# Patient Record
Sex: FEMALE | Race: WHITE | NOT HISPANIC OR LATINO | Employment: OTHER | URBAN - METROPOLITAN AREA
[De-identification: names, ages, dates, MRNs, and addresses within clinical notes are randomized per-mention and may not be internally consistent; named-entity substitution may affect disease eponyms.]

---

## 2017-02-02 ENCOUNTER — OFFICE VISIT (OUTPATIENT)
Dept: FAMILY MEDICINE | Facility: CLINIC | Age: 82
End: 2017-02-02
Payer: MEDICARE

## 2017-02-02 VITALS
DIASTOLIC BLOOD PRESSURE: 78 MMHG | OXYGEN SATURATION: 97 % | BODY MASS INDEX: 21.84 KG/M2 | RESPIRATION RATE: 19 BRPM | SYSTOLIC BLOOD PRESSURE: 138 MMHG | HEART RATE: 61 BPM | HEIGHT: 63 IN | WEIGHT: 123.25 LBS

## 2017-02-02 DIAGNOSIS — E55.9 VITAMIN D DEFICIENCY: ICD-10-CM

## 2017-02-02 DIAGNOSIS — Z78.9 ALCOHOL USE: Chronic | ICD-10-CM

## 2017-02-02 DIAGNOSIS — Z86.73 H/O: STROKE: ICD-10-CM

## 2017-02-02 DIAGNOSIS — G31.84 MILD COGNITIVE IMPAIRMENT: ICD-10-CM

## 2017-02-02 DIAGNOSIS — E78.5 HYPERLIPIDEMIA, UNSPECIFIED HYPERLIPIDEMIA TYPE: ICD-10-CM

## 2017-02-02 DIAGNOSIS — Z87.81 HISTORY OF VERTEBRAL FRACTURE: ICD-10-CM

## 2017-02-02 DIAGNOSIS — I70.0 ATHEROSCLEROSIS OF AORTA: ICD-10-CM

## 2017-02-02 DIAGNOSIS — E03.9 ACQUIRED HYPOTHYROIDISM: ICD-10-CM

## 2017-02-02 DIAGNOSIS — I10 HYPERTENSION, BENIGN: ICD-10-CM

## 2017-02-02 DIAGNOSIS — Z00.00 ENCOUNTER FOR PREVENTIVE HEALTH EXAMINATION: Primary | ICD-10-CM

## 2017-02-02 PROCEDURE — 99999 PR PBB SHADOW E&M-EST. PATIENT-LVL IV: CPT | Mod: PBBFAC,,, | Performed by: NURSE PRACTITIONER

## 2017-02-02 PROCEDURE — 3078F DIAST BP <80 MM HG: CPT | Mod: S$GLB,,, | Performed by: NURSE PRACTITIONER

## 2017-02-02 PROCEDURE — G0439 PPPS, SUBSEQ VISIT: HCPCS | Mod: S$GLB,,, | Performed by: NURSE PRACTITIONER

## 2017-02-02 PROCEDURE — 99499 UNLISTED E&M SERVICE: CPT | Mod: S$GLB,,, | Performed by: NURSE PRACTITIONER

## 2017-02-02 PROCEDURE — 3075F SYST BP GE 130 - 139MM HG: CPT | Mod: S$GLB,,, | Performed by: NURSE PRACTITIONER

## 2017-02-02 NOTE — MR AVS SNAPSHOT
[facility]  [address]  [place] [address]  Phone: [phone]  Fax: [phone]                  Sharona Acosta   2017 10:00 AM   Office Visit    Description:  Female : 1935   Provider:  HRA, LAPALCO 3   Department:  [facility]           Reason for Visit     Health Risk Assessment                To Do List           Goals (5 Years of Data)     None      [facility] On Call     [facility] On Call Nurse Care Line -  Assistance  Registered nurses in the [facility] On Call Center provide clinical advisement, health education, appointment booking, and other advisory services.  Call for this free service at [phone].             Medications           Message regarding Medications     Verify the changes and/or additions to your medication regime listed below are the same as discussed with your clinician today.  If any of these changes or additions are incorrect, please notify your healthcare provider.             Verify that the below list of medications is an accurate representation of the medications you are currently taking.  If none reported, the list may be blank. If incorrect, please contact your healthcare provider. Carry this list with you in case of emergency.           Current Medications     acetaminophen (TYLENOL) 325 MG tablet Take 2 tablets (650 mg total) by mouth every 8 (eight) hours as needed (Mild pain or T>100.4).    alendronate (FOSAMAX) 70 MG tablet TAKE 1 TABLET BY MOUTH EVERY 7 DAYS    aspirin 81 MG Chew Take 81 mg by mouth once daily.      levothyroxine (SYNTHROID) 75 MCG tablet TAKE 1 TABLET BY MOUTH EVERY DAY    simvastatin (ZOCOR) 40 MG tablet TAKE ONE TABLET BY MOUTH EVERY EVENING    verapamil (VERELAN) 240 MG C24P TAKE 1 CAPSULE BY MOUTH EVERY DAY    diazepam (VALIUM) 2 MG tablet Take 1 tab bid for 2 days, then 1 tab daily for 2 days then stop.    hydrocodone-acetaminophen 5-325mg (NORCO) 5-325 mg per tablet Take 1 tablet by mouth  "every 8 (eight) hours as needed (Severe pain).    polyethylene glycol (GLYCOLAX) 17 gram PwPk Take 17 g by mouth once daily.           Clinical Reference Information           Your Vitals Were     BP Pulse Resp Height Weight SpO2    138/78 (BP Location: Left arm, Patient Position: Sitting, BP Method: Manual) 61 19 5' 3" (1.6 m) 55.9 kg (123 lb 3.8 oz) 97%    BMI                21.83 kg/m2          Blood Pressure          Most Recent Value    BP  138/78      Allergies as of 2/2/2017     Lipitor [Atorvastatin]      Immunizations Administered on Date of Encounter - 2/2/2017     None      Language Assistance Services     ATTENTION: Language assistance services are available, free of charge. Please call [phone].      ATENCIÓN: Si louiela kelly, tiene a gregg disposición servicios gratuitos de asistencia lingüística. Llame al [phone].     BG Ý: N?u b?n nói Ti?ng Vi?t, có các d?ch v? h? tr? ngôn ng? mi?n phí dành cho b?n. G?i s? [phone].         [facility] complies with applicable Federal civil rights laws and does not discriminate on the basis of race, color, national origin, age, disability, or sex.        "

## 2017-02-05 DIAGNOSIS — E78.5 HYPERLIPIDEMIA: ICD-10-CM

## 2017-02-05 DIAGNOSIS — I10 HYPERTENSION, BENIGN: ICD-10-CM

## 2017-02-06 RX ORDER — LEVOTHYROXINE SODIUM 75 UG/1
TABLET ORAL
Qty: 90 TABLET | Refills: 0 | Status: SHIPPED | OUTPATIENT
Start: 2017-02-06 | End: 2017-07-18 | Stop reason: SDUPTHER

## 2017-02-06 RX ORDER — VERAPAMIL HYDROCHLORIDE 240 MG/1
CAPSULE, EXTENDED RELEASE ORAL
Qty: 90 CAPSULE | Refills: 0 | Status: SHIPPED | OUTPATIENT
Start: 2017-02-06 | End: 2017-07-18 | Stop reason: SDUPTHER

## 2017-02-06 RX ORDER — SIMVASTATIN 40 MG/1
TABLET, FILM COATED ORAL
Qty: 90 TABLET | Refills: 0 | Status: SHIPPED | OUTPATIENT
Start: 2017-02-06 | End: 2017-07-18 | Stop reason: SDUPTHER

## 2017-02-06 NOTE — PROGRESS NOTES
"Sharona Acosta presented for a  Medicare AWV and comprehensive Health Risk Assessment today. The following components were reviewed and updated:    · Medical history  · Family History  · Social history  · Allergies and Current Medications  · Health Risk Assessment  · Health Maintenance  · Care Team     ** See Completed Assessments for Annual Wellness Visit within the encounter summary.**       The following assessments were completed:  · Living Situation  · CAGE  · Depression Screening  · Timed Get Up and Go  · Whisper Test  · Cognitive Function Screening  · Nutrition Screening  · ADL Screening  · PAQ Screening    Vitals:    02/02/17 1121   BP: 138/78   BP Location: Left arm   Patient Position: Sitting   BP Method: Manual   Pulse: 61   Resp: 19   SpO2: 97%   Weight: 55.9 kg (123 lb 3.8 oz)   Height: 5' 3" (1.6 m)     Body mass index is 21.83 kg/(m^2).  Physical Exam   Cardiovascular: Normal rate, regular rhythm and normal heart sounds.    Pulmonary/Chest: Effort normal and breath sounds normal.   Neurological: She is alert. Abnormal gait: using 4-prong walker for gait assistance    Skin: Skin is warm.   Psychiatric: She has a normal mood and affect. Her behavior is normal. Thought content normal.   Vitals reviewed.        Diagnoses and health risks identified today and associated recommendations/orders:    1. Encounter for preventive health examination  Education provided about prevent health examinations and procedures; discussed patient's health concerns, holistically addressed patient's health plan.   Patient presented with spouse.     2. Atherosclerosis of aorta  Stable, asymptomatic on ASA, STATIN, and antihypertensives with good control; monitor    3. Hypertension, benign  The current medical regimen is effective;  continue present plan and medications.    4. Hyperlipidemia, unspecified hyperlipidemia type  Lipid panel (Sept 2015) reflects with acceptable range. The current medical regimen is effective;  continue " present plan and medications.    5. Urinary complication  Patient using adult brief for sanitary daytime and nocturia purposes.    6. History of vertebral fracture  Stable, with history of falls and fractures, she uses 4 prong walker for gait assistance; provided patient with fall risk band.     7. Alcohol use  Discussed the health risks associated with alcohol usage.     8. Acquired hypothyroidism  TSH 2.334 (Sept 2015).   The current medical regimen is effective;  continue present plan and medications.    9. H/O: stroke  Stable, monitor.     10. Vitamin D deficiency  Stable, monitor.     11. Mild cognitive impairment  Stable, patient recently moved into an assisted living facility ([facility]); she expresses being receptive of the new living arrangements. She lives with her , participates in fitness activities at the facility.     Return in about 4 weeks (around 3/2/2017).    [facility] Jr, NP

## 2017-02-06 NOTE — PATIENT INSTRUCTIONS
Counseling and Referral of Other Preventative  (Italic type indicates deductible and co-insurance are waived)    Patient Name: Sharona Acosta  Today's Date: 2/6/2017      SERVICE LIMITATIONS RECOMMENDATION    Vaccines    · Pneumococcal (once after 65)    · Influenza (annually)    · Hepatitis B (if medium/high risk)    · Prevnar 13      Hepatitis B medium/high risk factors:       - End-stage renal disease       - Hemophiliacs who received Factor VII or         IX concentrates       - Clients of institutions for the mentally             retarded       - Persons who live in the same house as          a HepB carrier       - Homosexual men       - Illicit injectable drug abusers     Pneumococcal: Done, no repeat necessary     Influenza: Done, repeat in one year     Hepatitis B: N/A no clinical risk factors      Prevnar 13: Done, repeat at next scheduled date    Mammogram (biennial age 50-74)  Annually (age 40 or over)  Last done 3/2014, recommend to repeat every 1  years    Pap (up to age 70 and after 70 if unknown history or abnormal study last 10 years)    N/A age: 79, no clinical risk factors      age: 81, no clinical risk factors     Colorectal cancer screening (to age 75)    · Fecal occult blood test (annual)  · Flexible sigmoidoscopy (5y)  · Screening colonoscopy (10y)  · Barium enema   N/A age: 81, no clinical risk factors     Diabetes self-management training (no USPSTF recommendations)  Requires referral by treating physician for patient with diabetes or renal disease. 10 hours of initial DSMT sessions of no less than 30 minutes each in a continuous 12-month period. 2 hours of follow-up DSMT in subsequent years.  N/A non-diabetic, no clinical risk factors     Bone mass measurements (age 65 & older, biennial)  Requires diagnosis related to osteoporosis or estrogen deficiency. Biennial benefit unless patient has history of long-term glucocorticoid  Last done 9/2016, recommend to repeat every 2  years    Glaucoma  screening (no USPSTF recommendation)  Diabetes mellitus, family history   , age 50 or over    American, age 65 or over  N/A patient has external eye care provider     Medical nutrition therapy for diabetes or renal disease (no recommended schedule)  Requires referral by treating physician for patient with diabetes or renal disease or kidney transplant within the past 3 years.  Can be provided in same year as diabetes self-management training (DSMT), and CMS recommends medical nutrition therapy take place after DSMT. Up to 3 hours for initial year and 2 hours in subsequent years.  N/A no clinical risk factors     Cardiovascular screening blood tests (every 5 years)  · Fasting lipid panel  Order as a panel if possible  Last done 9/2015, recommend to repeat every 5  years    Diabetes screening tests (at least every 3 years, Medicare covers annually or at 6-month intervals for prediabetic patients)  · Fasting blood sugar (FBS) or glucose tolerance test (GTT)  Patient must be diagnosed with one of the following:       - Hypertension       - Dyslipidemia       - Obesity (BMI 30kg/m2)       - Previous elevated impaired FBS or GTT       ... or any two of the following:       - Overweight (BMI 25 but <30)       - Family history of diabetes       - Age 65 or older       - History of gestational diabetes or birth of baby weighing more than 9 pounds  CMP 9/2015 Fasting glucose within normal range      Abdominal aortic aneurysm screening (once)  · Sonogram   Limited to patients who meet one of the following criteria:       - Men who are 65-75 years old and have smoked more than 100 cigarette in their lifetime       - Anyone with a family history of abdominal aortic aneurysm       - Anyone recommended for screening by the USPSTF  N/A no clinical risk factors     HIV screening (annually for increased risk patients)  · HIV-1 and HIV-2 by EIA, or RILEY, rapid antibody test or oral mucosa transudate  Patients  must be at increased risk for HIV infection per USPSTF guidelines or pregnant. Tests covered annually for patient at increased risk or as requested by the patient. Pregnant patients may receive up to 3 tests during pregnancy.  Risks discussed, screening is not recommended    Smoking cessation counseling (up to 8 sessions per year)  Patients must be asymptomatic of tobacco-related conditions to receive as a preventative service.  does not use tobacco     Subsequent annual wellness visit  At least 12 months since last AWV  Return in one year     The following information is provided to all patients.  This information is to help you find resources for any of the problems found today that may be affecting your health:                Living healthy guide: www.[facility].[place].[facility]      Understanding Diabetes: www.diabetes.org      Eating healthy: www.cdc.gov/healthyweight      CDC home safety checklist: www.cdc.gov/steadi/patient.html      Agency on Aging: www.goea.[place].[facility]      Alcoholics anonymous (AA): www.aa.org      Physical Activity: www.maria fernanda.nih.gov/ip9rkii      Tobacco use: www.quitwithusla.org

## 2017-03-26 DIAGNOSIS — M85.80 OSTEOPENIA: ICD-10-CM

## 2017-03-27 RX ORDER — ALENDRONATE SODIUM 70 MG/1
TABLET ORAL
Qty: 12 TABLET | Refills: 0 | Status: SHIPPED | OUTPATIENT
Start: 2017-03-27 | End: 2017-06-09

## 2017-04-27 ENCOUNTER — TELEPHONE (OUTPATIENT)
Dept: FAMILY MEDICINE | Facility: CLINIC | Age: 82
End: 2017-04-27

## 2017-04-27 NOTE — TELEPHONE ENCOUNTER
Was she started on any pain medication?  This BP is ok, but could start going up if now off BP medication. Would continue to monitor daily and call us back with results beginning of next week.

## 2017-04-27 NOTE — TELEPHONE ENCOUNTER
Spoke w/patient's daughter states Adali patient was hospitalized at [place] for a fall she had on Sunday. [facility] patient's b/p was elevated andl they did not know what b/p medications patient was on, therefore patient was prescribed Lisinopril 5mg daily and instructed to drink plenty of water with it. Want to know if patient should continue taking Verapamil 240mg or begin taking the Lisinopril. Please advise, [facility] records requested. [facility] appt scheduled 5/4/17.

## 2017-04-27 NOTE — TELEPHONE ENCOUNTER
----- Message from Kelli Pascual sent at 4/27/2017 12:51 PM CDT -----  Contact: Daughter  Adali [phone]  Daughter is requesting to speak to you, mother was at [facility] and they changed her BP meds, requesting to talk to you regarding this. Pls call Adali [phone]. Thanks........Мария

## 2017-04-27 NOTE — TELEPHONE ENCOUNTER
Received call from patient's daughter, states [facility]  agency recorded today's b/p as 138/82. [facility] patient has not taken any Verapamil or Lisinopril today.

## 2017-04-27 NOTE — TELEPHONE ENCOUNTER
Spoke w/patient's daughter Adali states patient's b/p was never assessed at home. And the only medication she was taken was the one that is in her records. States the home health nurse ( do not know name of of HH company)came out today and b/p was 140 something over about 80.

## 2017-04-28 ENCOUNTER — HOSPITAL ENCOUNTER (INPATIENT)
Facility: HOSPITAL | Age: 82
LOS: 14 days | Discharge: NURSING, REHAB OR LONG-TERM CARE | DRG: 871 | End: 2017-05-12
Attending: EMERGENCY MEDICINE | Admitting: HOSPITALIST
Payer: MEDICARE

## 2017-04-28 DIAGNOSIS — R79.89 ELEVATED TROPONIN I LEVEL: ICD-10-CM

## 2017-04-28 DIAGNOSIS — G93.41 ENCEPHALOPATHY, METABOLIC: ICD-10-CM

## 2017-04-28 DIAGNOSIS — E03.9 ACQUIRED HYPOTHYROIDISM: ICD-10-CM

## 2017-04-28 DIAGNOSIS — J15.9 BACTERIAL PNEUMONIA: ICD-10-CM

## 2017-04-28 DIAGNOSIS — E78.5 HYPERLIPIDEMIA, UNSPECIFIED HYPERLIPIDEMIA TYPE: ICD-10-CM

## 2017-04-28 DIAGNOSIS — D72.829 LEUKOCYTOSIS, UNSPECIFIED TYPE: ICD-10-CM

## 2017-04-28 DIAGNOSIS — Z86.73 H/O: STROKE: ICD-10-CM

## 2017-04-28 DIAGNOSIS — R93.1 ABNORMAL ECHOCARDIOGRAM: ICD-10-CM

## 2017-04-28 DIAGNOSIS — A41.9 SEPSIS, DUE TO UNSPECIFIED ORGANISM: Primary | ICD-10-CM

## 2017-04-28 DIAGNOSIS — Z78.9 ALCOHOL USE: Chronic | ICD-10-CM

## 2017-04-28 DIAGNOSIS — Z87.81 HISTORY OF VERTEBRAL FRACTURE: ICD-10-CM

## 2017-04-28 DIAGNOSIS — A49.8 CLOSTRIDIUM DIFFICILE INFECTION: ICD-10-CM

## 2017-04-28 DIAGNOSIS — I10 HYPERTENSION, BENIGN: ICD-10-CM

## 2017-04-28 DIAGNOSIS — R93.1 ABNORMAL ECHOCARDIOGRAM FINDINGS WITHOUT DIAGNOSIS: ICD-10-CM

## 2017-04-28 DIAGNOSIS — J18.9 HCAP (HEALTHCARE-ASSOCIATED PNEUMONIA): ICD-10-CM

## 2017-04-28 DIAGNOSIS — R79.89 ELEVATED TROPONIN: ICD-10-CM

## 2017-04-28 DIAGNOSIS — I35.0 NONRHEUMATIC AORTIC VALVE STENOSIS: ICD-10-CM

## 2017-04-28 DIAGNOSIS — D72.9 NEUTROPHILIC LEUKOCYTOSIS: ICD-10-CM

## 2017-04-28 DIAGNOSIS — A41.9 SEPSIS, UNSPECIFIED: ICD-10-CM

## 2017-04-28 DIAGNOSIS — R53.81 PHYSICAL DECONDITIONING: ICD-10-CM

## 2017-04-28 DIAGNOSIS — I70.0 ATHEROSCLEROSIS OF AORTA: ICD-10-CM

## 2017-04-28 DIAGNOSIS — E55.9 VITAMIN D DEFICIENCY: ICD-10-CM

## 2017-04-28 DIAGNOSIS — A41.9 SEPSIS: ICD-10-CM

## 2017-04-28 DIAGNOSIS — R50.9 FEVER, UNSPECIFIED FEVER CAUSE: ICD-10-CM

## 2017-04-28 DIAGNOSIS — M85.80 OSTEOPENIA, UNSPECIFIED LOCATION: ICD-10-CM

## 2017-04-28 DIAGNOSIS — E87.6 HYPOKALEMIA: ICD-10-CM

## 2017-04-28 DIAGNOSIS — G31.84 MILD COGNITIVE IMPAIRMENT: ICD-10-CM

## 2017-04-28 DIAGNOSIS — I48.91 A-FIB: ICD-10-CM

## 2017-04-28 LAB
ALBUMIN SERPL BCP-MCNC: 3.2 G/DL
ALP SERPL-CCNC: 67 U/L
ALT SERPL W/O P-5'-P-CCNC: 7 U/L
ANION GAP SERPL CALC-SCNC: 10 MMOL/L
APTT BLDCRRT: 28.5 SEC
AST SERPL-CCNC: 17 U/L
BASOPHILS # BLD AUTO: 0.04 K/UL
BASOPHILS NFR BLD: 0.2 %
BILIRUB SERPL-MCNC: 0.5 MG/DL
BILIRUB UR QL STRIP: NEGATIVE
BNP SERPL-MCNC: 476 PG/ML
BUN SERPL-MCNC: 11 MG/DL
CALCIUM SERPL-MCNC: 9 MG/DL
CHLORIDE SERPL-SCNC: 105 MMOL/L
CLARITY UR: CLEAR
CO2 SERPL-SCNC: 23 MMOL/L
COLOR UR: YELLOW
CREAT SERPL-MCNC: 0.8 MG/DL
DIFFERENTIAL METHOD: ABNORMAL
EOSINOPHIL # BLD AUTO: 0 K/UL
EOSINOPHIL NFR BLD: 0 %
ERYTHROCYTE [DISTWIDTH] IN BLOOD BY AUTOMATED COUNT: 16.4 %
EST. GFR  (AFRICAN AMERICAN): >60 ML/MIN/1.73 M^2
EST. GFR  (NON AFRICAN AMERICAN): >60 ML/MIN/1.73 M^2
FLUAV AG SPEC QL IA: NEGATIVE
FLUBV AG SPEC QL IA: NEGATIVE
GLUCOSE SERPL-MCNC: 109 MG/DL
GLUCOSE UR QL STRIP: NEGATIVE
HCT VFR BLD AUTO: 39 %
HGB BLD-MCNC: 12.8 G/DL
HGB UR QL STRIP: NEGATIVE
INR PPP: 1
KETONES UR QL STRIP: NEGATIVE
LACTATE SERPL-SCNC: 0.8 MMOL/L
LEUKOCYTE ESTERASE UR QL STRIP: NEGATIVE
LYMPHOCYTES # BLD AUTO: 2.1 K/UL
LYMPHOCYTES NFR BLD: 11.4 %
MCH RBC QN AUTO: 29 PG
MCHC RBC AUTO-ENTMCNC: 32.8 %
MCV RBC AUTO: 88 FL
MONOCYTES # BLD AUTO: 1.5 K/UL
MONOCYTES NFR BLD: 8 %
NEUTROPHILS # BLD AUTO: 15 K/UL
NEUTROPHILS NFR BLD: 80.1 %
NITRITE UR QL STRIP: NEGATIVE
PH UR STRIP: 6 [PH] (ref 5–8)
PLATELET # BLD AUTO: 265 K/UL
PMV BLD AUTO: 11.7 FL
POCT GLUCOSE: 106 MG/DL (ref 70–110)
POTASSIUM SERPL-SCNC: 3.3 MMOL/L
PROT SERPL-MCNC: 7.2 G/DL
PROT UR QL STRIP: NEGATIVE
PROTHROMBIN TIME: 10.5 SEC
RBC # BLD AUTO: 4.42 M/UL
SODIUM SERPL-SCNC: 138 MMOL/L
SP GR UR STRIP: 1.01 (ref 1–1.03)
SPECIMEN SOURCE: NORMAL
TROPONIN I SERPL DL<=0.01 NG/ML-MCNC: 0.04 NG/ML
TSH SERPL DL<=0.005 MIU/L-ACNC: 3.69 UIU/ML
URN SPEC COLLECT METH UR: NORMAL
UROBILINOGEN UR STRIP-ACNC: NEGATIVE EU/DL
WBC # BLD AUTO: 18.77 K/UL

## 2017-04-28 PROCEDURE — 99285 EMERGENCY DEPT VISIT HI MDM: CPT | Mod: 25

## 2017-04-28 PROCEDURE — 87086 URINE CULTURE/COLONY COUNT: CPT

## 2017-04-28 PROCEDURE — 96366 THER/PROPH/DIAG IV INF ADDON: CPT

## 2017-04-28 PROCEDURE — 12000002 HC ACUTE/MED SURGE SEMI-PRIVATE ROOM

## 2017-04-28 PROCEDURE — 82962 GLUCOSE BLOOD TEST: CPT

## 2017-04-28 PROCEDURE — 81003 URINALYSIS AUTO W/O SCOPE: CPT

## 2017-04-28 PROCEDURE — 96365 THER/PROPH/DIAG IV INF INIT: CPT

## 2017-04-28 PROCEDURE — 87040 BLOOD CULTURE FOR BACTERIA: CPT | Mod: 59

## 2017-04-28 PROCEDURE — 93005 ELECTROCARDIOGRAM TRACING: CPT

## 2017-04-28 PROCEDURE — 96368 THER/DIAG CONCURRENT INF: CPT

## 2017-04-28 PROCEDURE — 84484 ASSAY OF TROPONIN QUANT: CPT

## 2017-04-28 PROCEDURE — 87400 INFLUENZA A/B EACH AG IA: CPT

## 2017-04-28 PROCEDURE — 63600175 PHARM REV CODE 636 W HCPCS: Performed by: EMERGENCY MEDICINE

## 2017-04-28 PROCEDURE — 80053 COMPREHEN METABOLIC PANEL: CPT

## 2017-04-28 PROCEDURE — 84443 ASSAY THYROID STIM HORMONE: CPT

## 2017-04-28 PROCEDURE — 25000003 PHARM REV CODE 250: Performed by: EMERGENCY MEDICINE

## 2017-04-28 PROCEDURE — 83880 ASSAY OF NATRIURETIC PEPTIDE: CPT

## 2017-04-28 PROCEDURE — 96361 HYDRATE IV INFUSION ADD-ON: CPT

## 2017-04-28 PROCEDURE — 83605 ASSAY OF LACTIC ACID: CPT

## 2017-04-28 PROCEDURE — P9612 CATHETERIZE FOR URINE SPEC: HCPCS

## 2017-04-28 PROCEDURE — 85610 PROTHROMBIN TIME: CPT

## 2017-04-28 PROCEDURE — 85730 THROMBOPLASTIN TIME PARTIAL: CPT

## 2017-04-28 PROCEDURE — 85025 COMPLETE CBC W/AUTO DIFF WBC: CPT

## 2017-04-28 RX ORDER — SIMVASTATIN 40 MG/1
40 TABLET, FILM COATED ORAL NIGHTLY
Status: DISCONTINUED | OUTPATIENT
Start: 2017-04-29 | End: 2017-05-12 | Stop reason: HOSPADM

## 2017-04-28 RX ORDER — POTASSIUM CHLORIDE 20 MEQ/15ML
20 SOLUTION ORAL
Status: COMPLETED | OUTPATIENT
Start: 2017-04-28 | End: 2017-04-28

## 2017-04-28 RX ORDER — ACETAMINOPHEN 325 MG/1
650 TABLET ORAL
Status: COMPLETED | OUTPATIENT
Start: 2017-04-28 | End: 2017-04-28

## 2017-04-28 RX ORDER — CEFEPIME HYDROCHLORIDE 2 G/50ML
2 INJECTION, SOLUTION INTRAVENOUS
Status: COMPLETED | OUTPATIENT
Start: 2017-04-28 | End: 2017-04-28

## 2017-04-28 RX ORDER — SODIUM CHLORIDE 9 MG/ML
INJECTION, SOLUTION INTRAVENOUS CONTINUOUS
Status: DISCONTINUED | OUTPATIENT
Start: 2017-04-29 | End: 2017-05-02

## 2017-04-28 RX ORDER — ASPIRIN 325 MG
325 TABLET ORAL
Status: COMPLETED | OUTPATIENT
Start: 2017-04-28 | End: 2017-04-28

## 2017-04-28 RX ORDER — LANOLIN ALCOHOL/MO/W.PET/CERES
100 CREAM (GRAM) TOPICAL DAILY
COMMUNITY

## 2017-04-28 RX ORDER — NAPROXEN SODIUM 220 MG
220 TABLET ORAL 2 TIMES DAILY WITH MEALS
Status: ON HOLD | COMMUNITY
End: 2017-05-12 | Stop reason: HOSPADM

## 2017-04-28 RX ADMIN — ACETAMINOPHEN 650 MG: 325 TABLET, FILM COATED ORAL at 07:04

## 2017-04-28 RX ADMIN — ASPIRIN 325 MG ORAL TABLET 325 MG: 325 PILL ORAL at 08:04

## 2017-04-28 RX ADMIN — SODIUM CHLORIDE: 0.9 INJECTION, SOLUTION INTRAVENOUS at 11:04

## 2017-04-28 RX ADMIN — CEFEPIME HYDROCHLORIDE 2 G: 2 INJECTION, SOLUTION INTRAVENOUS at 10:04

## 2017-04-28 RX ADMIN — SODIUM CHLORIDE 1770 ML: 0.9 INJECTION, SOLUTION INTRAVENOUS at 07:04

## 2017-04-28 RX ADMIN — POTASSIUM CHLORIDE 20 MEQ: 1.5 SOLUTION ORAL at 09:04

## 2017-04-28 RX ADMIN — VANCOMYCIN HYDROCHLORIDE 1250 MG: 1 INJECTION, POWDER, LYOPHILIZED, FOR SOLUTION INTRAVENOUS at 08:04

## 2017-04-28 RX ADMIN — SIMVASTATIN 40 MG: 40 TABLET, FILM COATED ORAL at 11:04

## 2017-04-29 PROBLEM — A41.9 SEPSIS DUE TO PNEUMONIA: Status: ACTIVE | Noted: 2017-04-29

## 2017-04-29 PROBLEM — J18.9 SEPSIS DUE TO PNEUMONIA: Status: ACTIVE | Noted: 2017-04-29

## 2017-04-29 PROBLEM — D72.9 NEUTROPHILIC LEUKOCYTOSIS: Status: ACTIVE | Noted: 2017-04-29

## 2017-04-29 PROBLEM — J18.9 HCAP (HEALTHCARE-ASSOCIATED PNEUMONIA): Status: ACTIVE | Noted: 2017-04-29

## 2017-04-29 PROBLEM — R79.89 ELEVATED TROPONIN: Status: ACTIVE | Noted: 2017-04-29

## 2017-04-29 PROBLEM — G93.41 ENCEPHALOPATHY, METABOLIC: Status: ACTIVE | Noted: 2017-04-29

## 2017-04-29 PROBLEM — J15.9 BACTERIAL PNEUMONIA: Status: ACTIVE | Noted: 2017-04-29

## 2017-04-29 PROBLEM — E87.6 HYPOKALEMIA: Status: ACTIVE | Noted: 2017-04-29

## 2017-04-29 LAB
ANION GAP SERPL CALC-SCNC: 10 MMOL/L
BASOPHILS # BLD AUTO: 0.04 K/UL
BASOPHILS NFR BLD: 0.2 %
BUN SERPL-MCNC: 12 MG/DL
CALCIUM SERPL-MCNC: 8.3 MG/DL
CHLORIDE SERPL-SCNC: 109 MMOL/L
CO2 SERPL-SCNC: 18 MMOL/L
CREAT SERPL-MCNC: 0.8 MG/DL
DIFFERENTIAL METHOD: ABNORMAL
EOSINOPHIL # BLD AUTO: 0 K/UL
EOSINOPHIL NFR BLD: 0.2 %
ERYTHROCYTE [DISTWIDTH] IN BLOOD BY AUTOMATED COUNT: 16.8 %
EST. GFR  (AFRICAN AMERICAN): >60 ML/MIN/1.73 M^2
EST. GFR  (NON AFRICAN AMERICAN): >60 ML/MIN/1.73 M^2
GLUCOSE SERPL-MCNC: 99 MG/DL
HCT VFR BLD AUTO: 39.8 %
HGB BLD-MCNC: 13 G/DL
LYMPHOCYTES # BLD AUTO: 2.5 K/UL
LYMPHOCYTES NFR BLD: 13.5 %
MCH RBC QN AUTO: 29.8 PG
MCHC RBC AUTO-ENTMCNC: 32.7 %
MCV RBC AUTO: 91 FL
MONOCYTES # BLD AUTO: 1.9 K/UL
MONOCYTES NFR BLD: 10.4 %
NEUTROPHILS # BLD AUTO: 14 K/UL
NEUTROPHILS NFR BLD: 75.3 %
PLATELET # BLD AUTO: 195 K/UL
PMV BLD AUTO: 11.6 FL
POCT GLUCOSE: 104 MG/DL (ref 70–110)
POCT GLUCOSE: 104 MG/DL (ref 70–110)
POTASSIUM SERPL-SCNC: 4.1 MMOL/L
RBC # BLD AUTO: 4.36 M/UL
SODIUM SERPL-SCNC: 137 MMOL/L
T3FREE SERPL-MCNC: <1 PG/ML
T4 FREE SERPL-MCNC: 0.76 NG/DL
TROPONIN I SERPL DL<=0.01 NG/ML-MCNC: 0.05 NG/ML
VANCOMYCIN SERPL-MCNC: 13.8 UG/ML
WBC # BLD AUTO: 18.63 K/UL

## 2017-04-29 PROCEDURE — 36415 COLL VENOUS BLD VENIPUNCTURE: CPT

## 2017-04-29 PROCEDURE — 25000003 PHARM REV CODE 250: Performed by: HOSPITALIST

## 2017-04-29 PROCEDURE — 87077 CULTURE AEROBIC IDENTIFY: CPT

## 2017-04-29 PROCEDURE — 80048 BASIC METABOLIC PNL TOTAL CA: CPT

## 2017-04-29 PROCEDURE — 94761 N-INVAS EAR/PLS OXIMETRY MLT: CPT

## 2017-04-29 PROCEDURE — 97161 PT EVAL LOW COMPLEX 20 MIN: CPT

## 2017-04-29 PROCEDURE — 84439 ASSAY OF FREE THYROXINE: CPT

## 2017-04-29 PROCEDURE — 87449 NOS EACH ORGANISM AG IA: CPT

## 2017-04-29 PROCEDURE — 87427 SHIGA-LIKE TOXIN AG IA: CPT | Mod: 59

## 2017-04-29 PROCEDURE — 85025 COMPLETE CBC W/AUTO DIFF WBC: CPT

## 2017-04-29 PROCEDURE — 80202 ASSAY OF VANCOMYCIN: CPT

## 2017-04-29 PROCEDURE — 94640 AIRWAY INHALATION TREATMENT: CPT

## 2017-04-29 PROCEDURE — G8979 MOBILITY GOAL STATUS: HCPCS | Mod: CI

## 2017-04-29 PROCEDURE — 12000002 HC ACUTE/MED SURGE SEMI-PRIVATE ROOM

## 2017-04-29 PROCEDURE — 63600175 PHARM REV CODE 636 W HCPCS: Performed by: HOSPITALIST

## 2017-04-29 PROCEDURE — 87046 STOOL CULTR AEROBIC BACT EA: CPT

## 2017-04-29 PROCEDURE — 87186 SC STD MICRODIL/AGAR DIL: CPT

## 2017-04-29 PROCEDURE — 93005 ELECTROCARDIOGRAM TRACING: CPT

## 2017-04-29 PROCEDURE — 27000221 HC OXYGEN, UP TO 24 HOURS

## 2017-04-29 PROCEDURE — 25000242 PHARM REV CODE 250 ALT 637 W/ HCPCS: Performed by: HOSPITALIST

## 2017-04-29 PROCEDURE — 25000003 PHARM REV CODE 250: Performed by: EMERGENCY MEDICINE

## 2017-04-29 PROCEDURE — 87045 FECES CULTURE AEROBIC BACT: CPT

## 2017-04-29 PROCEDURE — 84484 ASSAY OF TROPONIN QUANT: CPT

## 2017-04-29 PROCEDURE — 97165 OT EVAL LOW COMPLEX 30 MIN: CPT

## 2017-04-29 PROCEDURE — 84481 FREE ASSAY (FT-3): CPT

## 2017-04-29 PROCEDURE — G8978 MOBILITY CURRENT STATUS: HCPCS | Mod: CK

## 2017-04-29 RX ORDER — ACETAMINOPHEN 325 MG/1
650 TABLET ORAL EVERY 8 HOURS PRN
Status: DISCONTINUED | OUTPATIENT
Start: 2017-04-29 | End: 2017-05-02

## 2017-04-29 RX ORDER — ACETAMINOPHEN 325 MG/1
650 TABLET ORAL EVERY 8 HOURS PRN
Status: DISCONTINUED | OUTPATIENT
Start: 2017-04-29 | End: 2017-04-29 | Stop reason: SDUPTHER

## 2017-04-29 RX ORDER — MORPHINE SULFATE 10 MG/ML
2 INJECTION INTRAMUSCULAR; INTRAVENOUS; SUBCUTANEOUS EVERY 4 HOURS PRN
Status: DISCONTINUED | OUTPATIENT
Start: 2017-04-29 | End: 2017-05-02

## 2017-04-29 RX ORDER — LEVOTHYROXINE SODIUM 75 UG/1
75 TABLET ORAL DAILY
Status: DISCONTINUED | OUTPATIENT
Start: 2017-04-29 | End: 2017-05-12 | Stop reason: HOSPADM

## 2017-04-29 RX ORDER — MAGNESIUM SULFATE 1 G/100ML
1 INJECTION INTRAVENOUS ONCE
Status: COMPLETED | OUTPATIENT
Start: 2017-04-29 | End: 2017-04-29

## 2017-04-29 RX ORDER — CEFEPIME HYDROCHLORIDE 1 G/50ML
1 INJECTION, SOLUTION INTRAVENOUS
Status: DISCONTINUED | OUTPATIENT
Start: 2017-04-29 | End: 2017-04-30

## 2017-04-29 RX ORDER — ACETAMINOPHEN 325 MG/1
650 TABLET ORAL EVERY 8 HOURS PRN
Status: DISCONTINUED | OUTPATIENT
Start: 2017-04-29 | End: 2017-05-12 | Stop reason: HOSPADM

## 2017-04-29 RX ORDER — BENZONATATE 100 MG/1
100 CAPSULE ORAL 3 TIMES DAILY PRN
Status: DISCONTINUED | OUTPATIENT
Start: 2017-04-29 | End: 2017-05-12 | Stop reason: HOSPADM

## 2017-04-29 RX ORDER — IPRATROPIUM BROMIDE AND ALBUTEROL SULFATE 2.5; .5 MG/3ML; MG/3ML
3 SOLUTION RESPIRATORY (INHALATION) EVERY 6 HOURS
Status: DISCONTINUED | OUTPATIENT
Start: 2017-04-29 | End: 2017-05-02

## 2017-04-29 RX ORDER — GUAIFENESIN/DEXTROMETHORPHAN 100-10MG/5
10 SYRUP ORAL EVERY 6 HOURS
Status: DISCONTINUED | OUTPATIENT
Start: 2017-04-29 | End: 2017-05-02

## 2017-04-29 RX ORDER — CEFEPIME HYDROCHLORIDE 1 G/50ML
1 INJECTION, SOLUTION INTRAVENOUS
Status: DISCONTINUED | OUTPATIENT
Start: 2017-04-29 | End: 2017-04-29

## 2017-04-29 RX ORDER — NAPROXEN SODIUM 220 MG/1
81 TABLET, FILM COATED ORAL DAILY
Status: DISCONTINUED | OUTPATIENT
Start: 2017-04-29 | End: 2017-05-12 | Stop reason: HOSPADM

## 2017-04-29 RX ORDER — VERAPAMIL HYDROCHLORIDE 180 MG/1
180 TABLET, FILM COATED, EXTENDED RELEASE ORAL DAILY
Status: DISCONTINUED | OUTPATIENT
Start: 2017-04-29 | End: 2017-05-12 | Stop reason: HOSPADM

## 2017-04-29 RX ORDER — ONDANSETRON 2 MG/ML
4 INJECTION INTRAMUSCULAR; INTRAVENOUS EVERY 12 HOURS PRN
Status: DISCONTINUED | OUTPATIENT
Start: 2017-04-29 | End: 2017-05-12 | Stop reason: HOSPADM

## 2017-04-29 RX ORDER — MORPHINE SULFATE 10 MG/ML
4 INJECTION INTRAMUSCULAR; INTRAVENOUS; SUBCUTANEOUS EVERY 4 HOURS PRN
Status: DISCONTINUED | OUTPATIENT
Start: 2017-04-29 | End: 2017-05-02

## 2017-04-29 RX ORDER — MAGNESIUM SULFATE 1 G/100ML
1 INJECTION INTRAVENOUS ONCE
Status: DISCONTINUED | OUTPATIENT
Start: 2017-04-29 | End: 2017-04-29

## 2017-04-29 RX ORDER — POTASSIUM CHLORIDE 7.45 MG/ML
10 INJECTION INTRAVENOUS
Status: DISCONTINUED | OUTPATIENT
Start: 2017-04-29 | End: 2017-04-29

## 2017-04-29 RX ORDER — POTASSIUM CHLORIDE 7.45 MG/ML
10 INJECTION INTRAVENOUS
Status: DISPENSED | OUTPATIENT
Start: 2017-04-29 | End: 2017-04-29

## 2017-04-29 RX ORDER — THIAMINE HCL 100 MG
100 TABLET ORAL DAILY
Status: DISCONTINUED | OUTPATIENT
Start: 2017-04-29 | End: 2017-05-12 | Stop reason: HOSPADM

## 2017-04-29 RX ADMIN — SIMVASTATIN 40 MG: 40 TABLET, FILM COATED ORAL at 09:04

## 2017-04-29 RX ADMIN — MAGNESIUM SULFATE IN DEXTROSE 1 G: 10 INJECTION, SOLUTION INTRAVENOUS at 04:04

## 2017-04-29 RX ADMIN — VERAPAMIL HYDROCHLORIDE 180 MG: 180 TABLET, FILM COATED, EXTENDED RELEASE ORAL at 08:04

## 2017-04-29 RX ADMIN — FOLIC ACID: 5 INJECTION, SOLUTION INTRAMUSCULAR; INTRAVENOUS; SUBCUTANEOUS at 03:04

## 2017-04-29 RX ADMIN — POTASSIUM CHLORIDE 10 MEQ: 7.46 INJECTION, SOLUTION INTRAVENOUS at 05:04

## 2017-04-29 RX ADMIN — LEVOTHYROXINE SODIUM 75 MCG: 75 TABLET ORAL at 08:04

## 2017-04-29 RX ADMIN — IPRATROPIUM BROMIDE AND ALBUTEROL SULFATE 3 ML: .5; 3 SOLUTION RESPIRATORY (INHALATION) at 07:04

## 2017-04-29 RX ADMIN — VANCOMYCIN HYDROCHLORIDE 1000 MG: 1 INJECTION, POWDER, LYOPHILIZED, FOR SOLUTION INTRAVENOUS at 09:04

## 2017-04-29 RX ADMIN — ASPIRIN 81 MG CHEWABLE TABLET 81 MG: 81 TABLET CHEWABLE at 08:04

## 2017-04-29 RX ADMIN — GUAIFENESIN AND DEXTROMETHORPHAN 10 ML: 100; 10 SYRUP ORAL at 06:04

## 2017-04-29 RX ADMIN — THIAMINE HCL TAB 100 MG 100 MG: 100 TAB at 08:04

## 2017-04-29 RX ADMIN — GUAIFENESIN AND DEXTROMETHORPHAN 10 ML: 100; 10 SYRUP ORAL at 03:04

## 2017-04-29 RX ADMIN — IPRATROPIUM BROMIDE AND ALBUTEROL SULFATE 3 ML: .5; 3 SOLUTION RESPIRATORY (INHALATION) at 08:04

## 2017-04-29 RX ADMIN — IPRATROPIUM BROMIDE AND ALBUTEROL SULFATE 3 ML: .5; 3 SOLUTION RESPIRATORY (INHALATION) at 12:04

## 2017-04-29 RX ADMIN — SODIUM CHLORIDE: 0.9 INJECTION, SOLUTION INTRAVENOUS at 09:04

## 2017-04-29 RX ADMIN — SODIUM CHLORIDE: 0.9 INJECTION, SOLUTION INTRAVENOUS at 08:04

## 2017-04-29 NOTE — ASSESSMENT & PLAN NOTE
· Likely cardiac strain associated with increased metabolic demand with acute infection and sepsis  · No obvious ischemia on EKG  · No complaint of chest pain  · Will trend cardiac enzymes

## 2017-04-29 NOTE — PLAN OF CARE
Problem: Patient Care Overview  Goal: Plan of Care Review  Outcome: Ongoing (interventions implemented as appropriate)  Plan of care established. VSSAF. Oriented to self only. Bed alarm armed and audible. Remains free from falls or further trauma.

## 2017-04-29 NOTE — ASSESSMENT & PLAN NOTE
· Goal while inpatient is a systolic blood pressure less than 160mmHg  · BP in acceptable range at this time  · hold home regimen with sepsis  · PRN antihypertensives available

## 2017-04-29 NOTE — H&P
[facility] Hospital Medicine  History & Physical    Patient Name: Sharona Acosta  MRN: [id number]  Admission Date: 04/29/2017  Attending Physician: Robert Jose MD, MPH      PCP:     Vianey Lee MD    CC:     Chief Complaint   Patient presents with    Altered Mental Status     pt c/o increased generalized weakness x 3 days, pt's family states that pt has been more confused then usual. Pt's family also states that pt has early onset dementia.    Fatigue       HISTORY OF PRESENT ILLNESS:     Sharona Acosta is a 81 y.o. female that (in part)  has a past medical history of Alcohol abuse; Arthritis; Atherosclerosis of aorta; Back pain; H/O: compression fracture of spine; H/O: stroke (1997); Hyperlipidemia; Hypertension; Hypothyroidism; Leukocytosis; Neutrophilic leukocytosis (4/29/2017); Normal vaginal delivery; Osteopenia; Sepsis due to HCAP pneumonia (4/29/2017); Thyroid disease; Urinary incontinence; Vitamin B 12 deficiency; and Vitamin D deficiency. Presents to [facility] Emergency Department with her daughter reports the patient has been experiencing 3 days of progressive weakness and confusion.  She normally ambulates independently but she has been doing so with her walker over the last 3 days.she had a fall and was taken to [facility] where she was released 3 days ago.  She was treated for a urinary tract infection and was given IV antibiotics while there.  No oral antibiotic for continued as an outpatient.  Patient currently resides in an independent living facility and there is concern that she is unable to take care of herself at home at this time.  Report of subjective fever.  The history is otherwise limited due to the condition of the patient.     In the emergency department routine laboratory studies and chest x-ray were obtained.  There is evidence of ill-defined bilateral lower interstitial opacities concerning for healthcare associated  pneumonia.  She also had hypokalemia.  Her mental status did not improve while in the emergency department.  She is also noted to have a slightlyelevated troponin level.     Hospital medicine has been asked to admit for further evaluation and treatment.       REVIEW OF SYSTEMS:     The available review of system is addressed in the HPI and is otherwise limited due to the condition of the patient.     PAST MEDICAL / SURGICAL HISTORY:     Past Medical History:   Diagnosis Date    Alcohol abuse     Arthritis     osteoarthritis    Atherosclerosis of aorta     noted on lumbar X-ray 4/1/2013    Back pain     H/O: compression fracture of spine     H/O: stroke 1997    h/o stroke more than 15 yrs ago with no residual    Hyperlipidemia     Hypertension     Hypothyroidism     Leukocytosis     Neutrophilic leukocytosis 4/29/2017    Normal vaginal delivery     x2    Osteopenia     Sepsis due to HCAP pneumonia 4/29/2017    Thyroid disease     hypothyroidism    Urinary incontinence     Vitamin B 12 deficiency     Vitamin D deficiency      Past Surgical History:   Procedure Laterality Date    BREAST BIOPSY      FIXATION KYPHOPLASTY  Sept.  2012    HYSTERECTOMY      JOINT REPLACEMENT      right hip replacement    TOTAL HIP ARTHROPLASTY           FAMILY HISTORY:     Family History   Problem Relation Age of Onset    Hypertension Mother     Stroke Mother     Hypertension Father     Diabetes Neg Hx     Colon cancer Neg Hx     Breast cancer Neg Hx          SOCIAL HISTORY:     Social History   Substance Use Topics    Smoking status: Former Smoker     Types: Cigarettes     Quit date: 5/15/1997    Smokeless tobacco: Never Used    Alcohol use 12.6 oz/week     21 Shots of liquor per week      Comment: occasional      Social History     Social History    Marital status:      Spouse name: N/A    Number of children: 2    Years of education: N/A     Occupational History    retired      Social History  Main Topics    Smoking status: Former Smoker     Types: Cigarettes     Quit date: 5/15/1997    Smokeless tobacco: Never Used    Alcohol use 12.6 oz/week     21 Shots of liquor per week      Comment: occasional     Drug use: No    Sexual activity: Not Asked     Other Topics Concern    None     Social History Narrative         ALLERGIES:       Review of patient's allergies indicates:   Allergen Reactions    Lipitor [atorvastatin] Nausea Only         HEALTH SCREENING:     -- Prevnar 13 pneumonia vaccine =  evidence of previous vaccination found in the medical record  -- Influenza vaccine = no evidence of current flu vaccination found in the medical record for this flu season      HOME MEDICATIONS:     Prior to Admission medications    Medication Sig Start Date End Date Taking? Authorizing Provider   alendronate (FOSAMAX) 70 MG tablet TAKE 1 TABLET BY MOUTH EVERY 7 DAYS 3/27/17  Yes Vianey Lee MD   levothyroxine (SYNTHROID) 75 MCG tablet TAKE 1 TABLET BY MOUTH EVERY DAY 2/6/17  Yes Vianey Lee MD   simvastatin (ZOCOR) 40 MG tablet TAKE 1 TABLET BY MOUTH EVERY EVENING 2/6/17  Yes Vianey Lee MD   thiamine (VITAMIN B-1) 100 MG tablet Take 100 mg by mouth once daily.   Yes Historical Provider, MD   verapamil (VERELAN) 240 MG C24P TAKE 1 CAPSULE BY MOUTH EVERY DAY 2/6/17  Yes Vianey Lee MD   acetaminophen (TYLENOL) 325 MG tablet Take 2 tablets (650 mg total) by mouth every 8 (eight) hours as needed (Mild pain or T>100.4). 6/6/16   Slaon Lombardi MD   aspirin 81 MG Chew Take 81 mg by mouth once daily.      Historical Provider, MD   diazepam (VALIUM) 2 MG tablet Take 1 tab bid for 2 days, then 1 tab daily for 2 days then stop. 6/6/16   Sloan Lombardi MD   hydrocodone-acetaminophen 5-325mg (NORCO) 5-325 mg per tablet Take 1 tablet by mouth every 8 (eight) hours as needed (Severe pain). 6/6/16 7/6/16  Sloan Lombardi MD   naproxen sodium (ANAPROX) 220 MG tablet Take 220 mg by mouth 2  (two) times daily with meals.    Historical Provider, MD   polyethylene glycol (GLYCOLAX) 17 gram PwPk Take 17 g by mouth once daily. 6/6/16   Sloan Lombardi MD         [facility] MEDICATIONS:     Scheduled Meds:   aspirin  81 mg Oral Daily    ceFEPime (MAXIPIME) IVPB  1 g Intravenous Q12H    levothyroxine  75 mcg Oral Daily    simvastatin  40 mg Oral QHS    thiamine  100 mg Oral Daily    vancomycin (VANCOCIN) IVPB  20 mg/kg Intravenous Q24H    verapamil  180 mg Oral Daily     Continuous Infusions:   sodium chloride 0.9% 75 mL/hr at 04/28/17 2352     PRN Meds:.acetaminophen, acetaminophen, morphine, morphine, ondansetron      PHYSICAL EXAM:     Wt Readings from Last 1 Encounters:   04/28/17 1901 59 kg (130 lb)     Body mass index is 21.63 kg/(m^2).  Vitals:    04/28/17 2319 04/28/17 2349 04/29/17 0019 04/29/17 0147   BP: (!) 113/54 (!) 110/55 113/83 (!) 125/58   BP Location:       Patient Position:       Pulse: 66 66 70 65   Resp: 16 16 16    Temp:    97.4 °F (36.3 °C)   TempSrc:       SpO2: 95% (!) 94% 99% 98%   Weight:       Height:              -- General appearance: chronically ill-appearing elderly female who is well developed. appears stated age   -- Head: normocephalic, atraumatic   -- Eyes: conjunctivae clear. Extraocular muscles intact  -- Nose: Nares normal. Septum midline.   -- Mouth/Throat: lips, mucosa, and tongue normal. no throat erythema.   -- Neck: supple, symmetrical, trachea midline, no JVD and thyroid not grossly enlarged, appears symmetric  -- Lungs: fine bibasilar crackles and rales. normal respiratory effort. No use of accessory muscles.   -- Chest wall: no tenderness. equal bilateral chest rise   -- Heart: regular rate and rhythm. S1, S2 normal.  no click, rub or gallop   -- Abdomen: soft, non-tender, non-distended, non-tympanic; bowel sounds normal; no masses  -- Extremities: no cyanosis, clubbing or edema.   -- Pulses: 2+ and symmetric   -- Skin: color normal, texture normal,  turgor normal. No rashes or lesions.   -- Neurologic: globally decreased muscle strength and tone. No focal numbness or weakness. CNII-XII intact. Afton coma scale: eyes open spontaneously-3, oriented & converses-3, obeys commands-4 = 10      LABORATORY STUDIES:     Recent Results (from the past 36 hour(s))   APTT    Collection Time: 04/28/17  7:40 PM   Result Value Ref Range    aPTT 28.5 21.0 - 32.0 sec   Brain natriuretic peptide    Collection Time: 04/28/17  7:40 PM   Result Value Ref Range     (H) 0 - 99 pg/mL   CBC auto differential    Collection Time: 04/28/17  7:40 PM   Result Value Ref Range    WBC 18.77 (H) 3.90 - 12.70 K/uL    RBC 4.42 4.00 - 5.40 M/uL    Hemoglobin 12.8 12.0 - 16.0 g/dL    Hematocrit 39.0 37.0 - 48.5 %    MCV 88 82 - 98 fL    MCH 29.0 27.0 - 31.0 pg    MCHC 32.8 32.0 - 36.0 %    RDW 16.4 (H) 11.5 - 14.5 %    Platelets 265 150 - 350 K/uL    MPV 11.7 9.2 - 12.9 fL    Gran # 15.0 (H) 1.8 - 7.7 K/uL    Lymph # 2.1 1.0 - 4.8 K/uL    Mono # 1.5 (H) 0.3 - 1.0 K/uL    Eos # 0.0 0.0 - 0.5 K/uL    Baso # 0.04 0.00 - 0.20 K/uL    Gran% 80.1 (H) 38.0 - 73.0 %    Lymph% 11.4 (L) 18.0 - 48.0 %    Mono% 8.0 4.0 - 15.0 %    Eosinophil% 0.0 0.0 - 8.0 %    Basophil% 0.2 0.0 - 1.9 %    Differential Method Automated    Comprehensive metabolic panel    Collection Time: 04/28/17  7:40 PM   Result Value Ref Range    Sodium 138 136 - 145 mmol/L    Potassium 3.3 (L) 3.5 - 5.1 mmol/L    Chloride 105 95 - 110 mmol/L    CO2 23 23 - 29 mmol/L    Glucose 109 70 - 110 mg/dL    BUN, Bld 11 8 - 23 mg/dL    Creatinine 0.8 0.5 - 1.4 mg/dL    Calcium 9.0 8.7 - 10.5 mg/dL    Total Protein 7.2 6.0 - 8.4 g/dL    Albumin 3.2 (L) 3.5 - 5.2 g/dL    Total Bilirubin 0.5 0.1 - 1.0 mg/dL    Alkaline Phosphatase 67 55 - 135 U/L    AST 17 10 - 40 U/L    ALT 7 (L) 10 - 44 U/L    Anion Gap 10 8 - 16 mmol/L    eGFR if African American >60 >60 mL/min/1.73 m^2    eGFR if non African American >60 >60 mL/min/1.73 m^2   Lactic acid,  plasma #1    Collection Time: 04/28/17  7:40 PM   Result Value Ref Range    Lactate (Lactic Acid) 0.8 0.5 - 2.2 mmol/L   Protime-INR    Collection Time: 04/28/17  7:40 PM   Result Value Ref Range    Prothrombin Time 10.5 9.0 - 12.5 sec    INR 1.0 0.8 - 1.2   Troponin I    Collection Time: 04/28/17  7:40 PM   Result Value Ref Range    Troponin I 0.044 (H) 0.000 - 0.026 ng/mL   TSH    Collection Time: 04/28/17  7:40 PM   Result Value Ref Range    TSH 3.694 0.400 - 4.000 uIU/mL   POCT glucose    Collection Time: 04/28/17  7:43 PM   Result Value Ref Range    POCT Glucose 106 70 - 110 mg/dL   Influenza antigen Nasopharyngeal Swab    Collection Time: 04/28/17  7:44 PM   Result Value Ref Range    Influenza A Ag, EIA Negative Negative    Influenza B Ag, EIA Negative Negative    Flu A & B Source Nasopharyngeal Swab    Urinalysis    Collection Time: 04/28/17  7:45 PM   Result Value Ref Range    Specimen UA Urine, Catheterized     Color, UA Yellow Yellow, Straw, Ila    Appearance, UA Clear Clear    pH, UA 6.0 5.0 - 8.0    Specific Gravity, UA 1.010 1.005 - 1.030    Protein, UA Negative Negative    Glucose, UA Negative Negative    Ketones, UA Negative Negative    Bilirubin (UA) Negative Negative    Occult Blood UA Negative Negative    Nitrite, UA Negative Negative    Urobilinogen, UA Negative <2.0 EU/dL    Leukocytes, UA Negative Negative   Troponin I    Collection Time: 04/29/17 12:45 AM   Result Value Ref Range    Troponin I 0.047 (H) 0.000 - 0.026 ng/mL       Lab Results   Component Value Date    INR 1.0 04/28/2017    INR 1.0 06/01/2016    INR 1.1 05/15/2013     Lab Results   Component Value Date    HGBA1C 5.1 07/08/2004     Recent Labs      04/28/17 1943   POCTGLUCOSE  106           MICROBIOLOGY DATA:     Microbiology x 7d:   Microbiology Results (last 7 days)     Procedure Component Value Units Date/Time    Urine culture [[id number]] Collected:  04/28/17 1945    Order Status:  Sent Specimen:  Urine from Urine,  Catheterized Updated:  04/28/17 2005    Blood culture x two cultures. Draw prior to antibiotics. [[id number]] Collected:  04/28/17 1940    Order Status:  Sent Specimen:  Blood from Peripheral, Antecubital, Right Updated:  04/28/17 1956    Narrative:       Aerobic and anaerobic    Blood culture x two cultures. Draw prior to antibiotics. [[id number]] Collected:  04/28/17 1940    Order Status:  Sent Specimen:  Blood from Peripheral, Antecubital, Right Updated:  04/28/17 1956    Narrative:       Aerobic and anaerobic            IMAGING:     Imaging Results         X-Ray Chest AP Portable (Final result) Result time:  04/28/17 20:41:17    Final result by Sonali Cardona MD (04/28/17 20:41:17)    Impression:      1. Increased ill-defined bilateral lower lung zone interstitial opacities, suspicious for edema or infectious/inflammatory disease.      Electronically signed by: SONALI CARDONA MD  Date:     04/28/17  Time:    20:41     Narrative:    Chest AP    Comparison: June 2, 2016    Findings: Ill-defined bilateral lower lung zone interstitial opacities, increased since the prior exam. No focal consolidation, effusion, or pneumothorax. No displaced fracture.                CONSULTS:     None       ASSESSMENT & PLAN:     Primary Diagnosis:  Sepsis due to pneumonia    Active Hospital Problems    Diagnosis  POA    *Sepsis due to HCAP pneumonia [J18.9, A41.9]  Yes     Priority: 1 - High    HCAP (healthcare-associated pneumonia) [J18.9]  Yes     Priority: 2      Chest x-ray notable for ill-defined bilateral lower lung interstitial opacities, concerning for pneumonia.  D/C from [facility] 3 days ago.      Neutrophilic leukocytosis [D72.9]  Yes     Priority: 3     Encephalopathy, metabolic [G93.41]  Yes     Priority: 4     Elevated troponin [R74.8]  Yes     Priority: 5     Hypokalemia [E87.6]  Yes     Priority: 6     Alcohol use [Z78.9]  Yes     Chronic    Mild cognitive impairment [G31.84]  Yes     - 2/1/2016  MMSE = 21      H/O: stroke [Z86.73]  Not Applicable     h/o stroke more than 15 yrs ago with no residual      Hypertension, benign [I10]  Yes    Hyperlipidemia [E78.5]  Yes    Hypothyroid [E03.9]  Yes      Resolved Hospital Problems    Diagnosis Date Resolved POA   No resolved problems to display.         Sepsis due to HCAP pneumonia  Pneumonia  · As evidence by history, physical exam, chest x-ray  · Chest x-ray notable for ill-defined bilateral lower lung interstitial opacities, concerning for pneumonia.  D/C from [facility] 3 days ago.  · Chest x-ray indicates possible underlying history of pulmonary fibrosis  · 3 out of 4 SIRS criteria  · Initiate IV antibiotics for community-acquired pneumonia with broad-spectrum antibiotics.    · If clinical improvement is not seen by tomorrow, broaden antibiotic coverage, further tailored by sputum Gram stain and culture results  · Robitussin  · Tessalon Perles  · Chest physiotherapy as needed  · Nebulizer treatments scheduled  · If clinical improvement is not obtained with the treatment above consider ordering PPD, quantiferon gold, histoplasma, blastomycosis urine antigens, aspergillus antigen, cryptococcus antigen, procalcitonin, and/or modified AFB.  · Pulmonology consult    HCAP (healthcare-associated pneumonia)  Management of sepsis as outlined above      Neutrophilic leukocytosis  Secondary to healthcare associated pneumonia      Encephalopathy, metabolic  · Secondary to sepsis with pneumonia as outlined above  · Also has underlying dementia  · Supportive care  · Fall precautions      Elevated troponin  · Likely cardiac strain associated with increased metabolic demand with acute infection and sepsis  · No obvious ischemia on EKG  · No complaint of chest pain  · Will trend cardiac enzymes      Hypokalemia  · Due to GI losses or poor oral intake  · Check magnesium  · Replace potassium orally if possible, if not then IV  · Monitor with serial  labs          Alcohol use  · Chronic alcohol use  · Monitor for DTs  · Banana bag ordered      Hypertension, benign  · Goal while inpatient is a systolic blood pressure less than 160mmHg  · BP in acceptable range at this time  · hold home regimen with sepsis  · PRN antihypertensives available        Hyperlipidemia  · Lipid panel - as an outpatient  · Cardiac diet  · Continue statin        Hypothyroid  · Clinically, patient is euthyroid   · Chemically, undetermined  · Obtain TSH, free T3, and free T4  · Continue current regimen      H/O: stroke  Cerebral vascular disease  · No evidence of acute stroke at this time  · Maintain adequate blood pressure control  · Heart healthy diet  · Aspirin  · Statin          Mild cognitive impairment  Supportive care          VTE Risk Mitigation         Ordered     Medium Risk of VTE  Once      04/29/17 0141     Place EVERETTE hose  Until discontinued      04/29/17 0141     Place sequential compression device  Until discontinued      04/29/17 0141            Adult PRN medications available   DVT prophylaxis given       DISPOSITION:     Will admit to the Hospital Medicine service for further evaluation and treatment.    Chart reviewed and updated where applicable.    High Risk Conditions:  Patient has a condition that poses threat to life and bodily function: healthcare associated pneumonia  Patient has an abrupt change in neurologic status: Weakness and metabolic encephalopathy        ===============================================================    Robert Jose MD, MPH  Department of Hospital Medicine   [facility]  [phone] pg  (7pm - 6am)          This note is dictated using Dragon Medical 360 voice recognition software.  There are word recognition mistakes that are occasionally missed on review.

## 2017-04-29 NOTE — PT/OT/SLP EVAL
Physical Therapy  Evaluation    Sharona Acosta   MRN: [id number]   Admitting Diagnosis: Blood poisoning    PT Received On: 17  PT Start Time: 1057     PT Stop Time: 1115    PT Total Time (min): 18 min       Billable Minutes:  Evaluation 18 min    Diagnosis: Blood poisoning      Past Medical History:   Diagnosis Date    Alcohol abuse     Arthritis     osteoarthritis    Atherosclerosis of aorta     noted on lumbar X-ray 2013    Back pain     H/O: compression fracture of spine     H/O: stroke     h/o stroke more than 15 yrs ago with no residual    Hyperlipidemia     Hypertension     Hypothyroidism     Leukocytosis     Neutrophilic leukocytosis 2017    Normal vaginal delivery     x2    Osteopenia     Sepsis due to HCAP pneumonia 2017    Thyroid disease     hypothyroidism    Urinary incontinence     Vitamin B 12 deficiency     Vitamin D deficiency       Past Surgical History:   Procedure Laterality Date    BREAST BIOPSY      FIXATION KYPHOPLASTY  Sept.      HYSTERECTOMY      JOINT REPLACEMENT      right hip replacement    TOTAL HIP ARTHROPLASTY         General Precautions: Standard, fall, contact    Patient History:  Lives With: spouse; Pt reported that she has 2 daughters, but a cousin assist her with cleaning and cooking.  Living Arrangements: independent living facility  Transportation Available: family or friend will provide (Pt reported spouse and cousin will drive her places.)  Equipment Currently Used at Home: shower chair, walker, rolling, wheelchair      Previous Level of Function:  Ambulation Skills: needs device (RW)    Subjective:  Communicated with nurse Iqbal prior to session.    Pt reported no dizziness or SOB during ambulation.  Chief Complaint: N/A  Patient goals: did not state    Pain Ratin/10                    Objective:   Patient found with: oxygen 2L, peripheral IV, telemetry     Cognitive Exam:  Oriented to: Person and Place (Pt stated  hospital, but unable to report specific.)    Follows Commands/attention: Follows two-step commands  Communication: clear/fluent  Safety awareness/insight to disability: impaired    Physical Exam:  Postural examination/scapula alignment: Rounded shoulder, Head forward and Abnormal trunk flexion    Skin integrity: Visible skin intact  Edema: None noted BLE    Sensation:   Intact  light/touch BLE    Lower Extremity Range of Motion:  Right Lower Extremity: WFL  Left Lower Extremity: WFL    Lower Extremity Strength:  Right Lower Extremity: WFL  Left Lower Extremity: WFL     Gross motor coordination: WFL    Functional Mobility: Pt with slow mobility, required extra time to complete task.    Bed Mobility:  Scooting/Bridging: Contact Guard Assistance  Supine to Sit: Contact Guard Assistance  Sit to Supine: Contact Guard Assistance, With leg lift    Transfers:  Sit <> Stand Assistance: Contact Guard Assistance  Sit <> Stand Assistive Device: Rolling Walker    Gait:   Gait Distance: 20 ft within room; Pt with forward flexed posture and decreased eliza.  Gait limited 2* loose stool.  Assistance 1: Contact Guard Assistance  Gait Assistive Device: Rolling walker  Gait Pattern: reciprocal  Gait Deviation(s): decreased weight-shifting ability, decreased toe-to-floor clearance, decreased step length, decreased velocity of limb motion, increased time in double stance, decreased eliza      Balance:   Static Sit: FAIR+: Able to take MINIMAL challenges from all directions  Dynamic Sit: FAIR+: Maintains balance through MINIMAL excursions of active trunk motion  Static Stand: FAIR+: Takes MINIMAL challenges from all directions  Dynamic stand: FAIR: Needs CONTACT GUARD during gait      AM-PAC 6 CLICK MOBILITY  How much help from another person does this patient currently need?   1 = Unable, Total/Dependent Assistance  2 = A lot, Maximum/Moderate Assistance  3 = A little, Minimum/Contact Guard/Supervision  4 = None, Modified  [facility]/Independent    Turning over in bed (including adjusting bedclothes, sheets and blankets)?: 4  Sitting down on and standing up from a chair with arms (e.g., wheelchair, bedside commode, etc.): 3  Moving from lying on back to sitting on the side of the bed?: 3  Moving to and from a bed to a chair (including a wheelchair)?: 3  Need to walk in hospital room?: 3  Climbing 3-5 steps with a railing?: 2  Total Score: 18     AM-PAC Raw Score CMS G-Code Modifier Level of Impairment Assistance   6 % Total / Unable   7 - 9 CM 80 - 100% Maximal Assist   10 - 14 CL 60 - 80% Moderate Assist   15 - 19 CK 40 - 60% Moderate Assist   20 - 22 CJ 20 - 40% Minimal Assist   23 CI 1-20% SBA / CGA   24 CH 0% Independent/ Mod I     Patient left HOB elevated with all lines intact, call button in reach, bed alarm on and nurse Farida notified.    Assessment:     Rehab identified problem list/impairments: Rehab identified problem list/impairments: weakness, impaired endurance, impaired self care skills, impaired functional mobilty, gait instability, impaired balance, impaired cognition, decreased upper extremity function, decreased lower extremity function, decreased safety awareness    Rehab potential is fair+    Activity tolerance: Fair-    Discharge recommendations: Discharge Facility/Level Of Care Needs: home health PT (with 24 hour supervision )     Barriers to discharge: Barriers to Discharge: Other (Comment) (decreased safety awareness)    Equipment recommendations: Equipment Needed After Discharge: bedside commode     GOALS:   Physical Therapy Goals        Problem: Physical Therapy Goal    Goal Priority Disciplines Outcome Goal Variances Interventions   Physical Therapy Goal     PT/OT, PT      Description:  Goals to be met by: 17     Patient will increase functional independence with mobility by performin. Supine to sit with Supervision  2. Rolling to Left and Right with Supervision  3. Sit to stand  transfer with Supervision using RW  4. Bed to chair transfer with Supervision using Rolling Walker  5. Gait  x 150 feet with Supervision using Rolling Walker   6. Lower extremity exercise program x 20 reps per handout, with supervision                PLAN:    Patient to be seen daily to address the above listed problems via gait training, therapeutic activities, therapeutic exercises  Plan of Care expires: 05/13/17  Plan of Care reviewed with: patient    Functional Assessment Tool Used: AM-PAC  Score: 18  Functional Limitation: Mobility: Walking and moving around  Mobility: Walking and Moving Around Current Status (): CK  Mobility: Walking and Moving Around Goal Status (): SAHARA Delgado, PT  04/29/2017

## 2017-04-29 NOTE — ASSESSMENT & PLAN NOTE
· Due to GI losses or poor oral intake  · Check magnesium  · Replace potassium orally if possible, if not then IV  · Monitor with serial labs

## 2017-04-29 NOTE — ED PROVIDER NOTES
Encounter Date: 4/28/2017    SCRIBE #1 NOTE: I, Jose Luis Barnes, am scribing for, and in the presence of,  Kristen Soto MD. I have scribed the following portions of the note - Other sections scribed: HPI, ROS, PE.       History     Chief Complaint   Patient presents with    Altered Mental Status     pt c/o increased generalized weakness x 3 days, pt's family states that pt has been more confused then usual. Pt's family also states that pt has early onset dementia.    Fatigue     Review of patient's allergies indicates:   Allergen Reactions    Lipitor [atorvastatin] Nausea Only     HPI Comments: CC: Fatigue    HPI: This 81 year old female with a past medical history of arthritis, atherosclerosis of aorta, back pain, compression fracture of spine, hyperlipidemia, hypertension, hypothyroidism, osteopenia, and thyroid disease presents to the ED via EMS for evaluation of a 3 day history of worsening fatigue. Pt recently fell and was taken to [place]; she was released from the hospital on 4/26/17.  Per daughter, pt has had decreased appetite since her release from the hospital. Pt's daughter reports pt is normally able to ambulate with the assistance of a walker but has not been able to do that over the last 3 days. Daughter also reports pt was treated for UTI at [place] but was not prescribed any medications for this. Pt denies shortness of breath, fever, dysuria, abdominal pain, and vomiting. Pt lives in an independent living facility. No other symptoms reported. No alleviating factors or exacerbating factors reported.     The history is provided by the patient and a relative. No  was used.     Past Medical History:   Diagnosis Date    Alcohol abuse     Arthritis     osteoarthritis    Atherosclerosis of aorta     noted on lumbar X-ray 4/1/2013    Back pain     H/O: compression fracture of spine     H/O: stroke 1997    h/o stroke more than 15 yrs ago with no residual     Hyperlipidemia     Hypertension     Hypothyroidism     Leukocytosis     Normal vaginal delivery     x2    Osteopenia     Thyroid disease     hypothyroidism    Urinary incontinence     Vitamin B 12 deficiency     Vitamin D deficiency      Past Surgical History:   Procedure Laterality Date    BREAST BIOPSY      FIXATION KYPHOPLASTY  Sept. 2012    HYSTERECTOMY      JOINT REPLACEMENT      right hip replacement    TOTAL HIP ARTHROPLASTY       Family History   Problem Relation Age of Onset    Hypertension Mother     Stroke Mother     Hypertension Father     Diabetes Neg Hx     Colon cancer Neg Hx     Breast cancer Neg Hx      Social History   Substance Use Topics    Smoking status: Former Smoker     Types: Cigarettes     Quit date: 5/15/1997    Smokeless tobacco: Never Used    Alcohol use 12.6 oz/week     21 Shots of liquor per week      Comment: occasional      Review of Systems   Constitutional: Positive for appetite change (decreased), fatigue and fever.   HENT: Negative for sore throat.    Respiratory: Negative for shortness of breath.    Cardiovascular: Negative for chest pain.   Gastrointestinal: Negative for abdominal pain, nausea and vomiting.   Genitourinary: Negative for dysuria.   Musculoskeletal: Negative for back pain.   Skin: Negative for rash.   Neurological: Positive for weakness.       Physical Exam   Initial Vitals   BP Pulse Resp Temp SpO2   04/28/17 1901 04/28/17 1901 04/28/17 1901 04/28/17 1901 04/28/17 1901   164/76 100 20 100.9 °F (38.3 °C) 100 %     Physical Exam    Nursing note and vitals reviewed.  Constitutional: She appears well-developed and well-nourished. She is cooperative.  Non-toxic appearance. No distress.   frail  No meningismus   HENT:   Head: Normocephalic and atraumatic.   Nose: Nose normal.   Mouth/Throat: Oropharynx is clear and moist.   Eyes: Conjunctivae, EOM and lids are normal. Pupils are equal, round, and reactive to light.   pale conjunctiva   Neck:  Normal range of motion and full passive range of motion without pain. Neck supple. No thyromegaly present. No JVD present.   Cardiovascular: Normal rate, regular rhythm, normal heart sounds and normal pulses.   Pulmonary/Chest: Effort normal and breath sounds normal. No respiratory distress.   Abdominal: Soft. Normal appearance and bowel sounds are normal. She exhibits no distension and no mass. There is no tenderness.   Musculoskeletal: Normal range of motion.   Neurological: She is alert and oriented to person, place, and time. She has normal strength. No cranial nerve deficit or sensory deficit.   Skin: Skin is warm, dry and intact. No rash noted.   No rash   Psychiatric: She has a normal mood and affect. Her speech is normal and behavior is normal. Judgment and thought content normal.   Flat affect         ED Course   Procedures  Labs Reviewed   B-TYPE NATRIURETIC PEPTIDE - Abnormal; Notable for the following:        Result Value     (*)     All other components within normal limits   CBC W/ AUTO DIFFERENTIAL - Abnormal; Notable for the following:     WBC 18.77 (*)     RDW 16.4 (*)     Gran # 15.0 (*)     Mono # 1.5 (*)     Gran% 80.1 (*)     Lymph% 11.4 (*)     All other components within normal limits   COMPREHENSIVE METABOLIC PANEL - Abnormal; Notable for the following:     Potassium 3.3 (*)     Albumin 3.2 (*)     ALT 7 (*)     All other components within normal limits   TROPONIN I - Abnormal; Notable for the following:     Troponin I 0.044 (*)     All other components within normal limits   CULTURE, BLOOD    Narrative:     Aerobic and anaerobic   CULTURE, BLOOD    Narrative:     Aerobic and anaerobic   CULTURE, URINE   APTT   LACTIC ACID, PLASMA   PROTIME-INR   TSH   URINALYSIS   INFLUENZA A AND B ANTIGEN   POCT GLUCOSE   POCT GLUCOSE MONITORING CONTINUOUS     EKG Readings: (Independently Interpreted)   Sinus Tachycardia at 100  Left axis deviatioin  Narrow QRS  Artifact Present  T-wave inversion (V2  & V3)  AVF (V3 and V6)          Medical Decision Making:   Initial Assessment:   Urgent evaluation of 81-year-old female brought in by daughters given concern for increasing generalized weakness, and decreased mobilization in the last week after an admission following a mechanical fall.  Patient was reportedly treated as inpatient for UTI, but was not discharged on oral antibiotics.  Here patient appears fatigued, chronically malnourished, but no respiratory distress, no abdominal tenderness, mental status at her baseline able to answer questions appropriate.  Given recent hospitalization, borderline fever, we'll perform septic evaluation administer IV fluids and reassess.  Clinical Tests:   Lab Tests: Ordered and Reviewed       <> Summary of Lab: Leukocytosis, elevated troponin, hypokalemia  Radiological Study: Ordered and Reviewed  ED Management:  Patient empirically covered with broad-spectrum antibiotics versus hospital-acquired infection given recent hospitalization.  Chest x-ray notable for ill-defined bilateral lower lung interstitial opacities, concerning for pneumonia.  Troponin leak likely secondary to increased metabolic demands given lack of ischemic EKG changes or chest discomfort, but given aspirin and will repeat.  We'll admit the patient for IV antibiotics, and follow up blood and urine cultures.            Scribe Attestation:   Scribe #1: I performed the above scribed service and the documentation accurately describes the services I performed. I attest to the accuracy of the note.    Attending Attestation:           Physician Attestation for Scribe:  Physician Attestation Statement for Scribe #1: I, Kristen Soto MD, reviewed documentation, as scribed by Jose Luis Barnes in my presence, and it is both accurate and complete.                 ED Course     Clinical Impression:   The primary encounter diagnosis was Sepsis, due to unspecified organism. Diagnoses of Leukocytosis, unspecified type,  Fever, unspecified fever cause, Elevated troponin, and Hypokalemia were also pertinent to this visit.    Disposition:   Disposition: Admitted  Condition: Fair       Kristen Soto MD  04/28/17 2111       Kristen Soto MD  04/28/17 2113       Kristen Soto MD  04/28/17 6509

## 2017-04-29 NOTE — PLAN OF CARE
Problem: Occupational Therapy Goal  Goal: Occupational Therapy Goal  Goals to be met by: 5.10.17     Patient will increase functional independence with ADLs by performing:    Feeding with Set-up Assistance.  UE Dressing with Minimal Assistance.  Grooming while seated with Contact Guard Assistance.  Rolling to Right with Set-up Assistance.   Upper extremity exercise program x10 reps per handout, with assistance as needed.  Outcome: Ongoing (interventions implemented as appropriate)  Pt would benefit from continued therapy while in hospital.

## 2017-04-29 NOTE — PT/OT/SLP EVAL
Occupational Therapy  Evaluation    Sharona Acosta   MRN: [id number]   Admitting Diagnosis: Sepsis due to pneumonia    OT Date of Treatment: 04/29/17   OT Start Time: 0728  OT Stop Time: 0745  OT Total Time (min): 17 min    Billable Minutes:  Evaluation 17    Diagnosis: Sepsis due to pneumonia       Past Medical History:   Diagnosis Date    Alcohol abuse     Arthritis     osteoarthritis    Atherosclerosis of aorta     noted on lumbar X-ray 4/1/2013    Back pain     H/O: compression fracture of spine     H/O: stroke 1997    h/o stroke more than 15 yrs ago with no residual    Hyperlipidemia     Hypertension     Hypothyroidism     Leukocytosis     Neutrophilic leukocytosis 4/29/2017    Normal vaginal delivery     x2    Osteopenia     Sepsis due to HCAP pneumonia 4/29/2017    Thyroid disease     hypothyroidism    Urinary incontinence     Vitamin B 12 deficiency     Vitamin D deficiency       Past Surgical History:   Procedure Laterality Date    BREAST BIOPSY      FIXATION KYPHOPLASTY  Sept. 2012    HYSTERECTOMY      JOINT REPLACEMENT      right hip replacement    TOTAL HIP ARTHROPLASTY         Referring physician: Kandice  Date referred to OT: 4/28/2017    General Precautions: Standard, contact, fall (recent adm to  secondary to fall)  Orthopedic Precautions: N/A  Braces: N/A    Patient History:  Living Environment  Lives With: spouse (Pt states that she lives with , attempted calling all number onfacesheet with no response)  Living Arrangements:  (MD notes state that pt was living in independetnt living)  Equipment Currently Used at Home:  (unable to determined as pt is confused)    Prior level of function:   Bed Mobility/Transfers:  (Chart states that pt was living in independent living)  Mode of Transportation: Family     Subjective:  Communicated with Farida prior to session.    Chief Complaint: pt did not have any complains (confusion)  Patient/Family stated goals: none stated    Pain  "Ratin/10              Pain Rating Post-Intervention: 0/10    Objective:  Patient found with: telemetry, oxygen, peripheral IV    Cognitive Exam:  Oriented to: Person and Place  Follows Commands/attention: Easily distracted  Communication: clear/fluent  Memory:  Impaired STM and Impaired LTM  Safety awareness/insight to disability: impaired  Coping skills/emotional control: Appropriate to situation    Visual/perceptual:  Intact    Physical Exam:  Postural examination/scapula alignment: Rounded shoulder  Skin integrity: Visible skin intact  Edema: None noted     Sensation:   Intact    Upper Extremity Range of Motion:  Right Upper Extremity: WFL  Left Upper Extremity: WFL    Upper Extremity Strength:  Right Upper Extremity: WFL  Left Upper Extremity: WFL   Strength: good    Fine motor coordination:   Intact    Gross motor coordination: WFL    Functional Mobility:  Bed Mobility:  Rolling/Turning to Left: Contact guard assistance  Rolling/Turning Right: Contact guard assistance    Transfers:       Functional Ambulation: not during this session    Activities of Daily Living:   UE Dressing Level of Assistance: Moderate assistance  LE Dressing Level of Assistance: Total assistance  Grooming Position:  (face washing)  Grooming Level of Assistance: Contact guard assistance     AM-PAC 6 CLICK ADL  How much help from another person does this patient currently need?  1 = Unable, Total/Dependent Assistance  2 = A lot, Maximum/Moderate Assistance  3 = A little, Minimum/Contact Guard/Supervision  4 = None, Modified [place]/Independent    Putting on and taking off regular lower body clothing? : 1  Bathing (including washing, rinsing, drying)?: 1  Toileting, which includes using toilet, bedpan, or urinal? : 1  Putting on and taking off regular upper body clothing?: 2  Taking care of personal grooming such as brushing teeth?: 3  Eating meals?: 3  Total Score: 11    AM-PAC Raw Score CMS "G-Code Modifier Level of " Impairment Assistance   6 % Total / Unable   7 - 9 CM 80 - 100% Maximal Assist   10 - 14 CL 60 - 80% Moderate Assist   15 - 19 CK 40 - 60% Moderate Assist   20 - 22 CJ 20 - 40% Minimal Assist   23 CI 1-20% SBA / CGA   24 CH 0% Independent/ Mod I       Patient left supine with all lines intact, call button in reach and PCT present    Assessment:  Sharona Acosta is a 81 y.o. female with a medical diagnosis of Sepsis due to pneumonia and presents with limited mobility. OT completed limited evaluation as pt was soiled upon session. Pt states that she lives with her  in an independent living apt going toward [place]. OT to address needs to improve general mobility.    Rehab identified problem list/impairments: Rehab identified problem list/impairments: weakness, impaired endurance, decreased safety awareness, impaired cognition, impaired self care skills, impaired functional mobilty    Rehab potential is good.    Activity tolerance: Good    Discharge recommendations: Discharge Facility/Level Of Care Needs:  (to be determined based on progress)     Barriers to discharge: Barriers to Discharge:  (to be determined as no family answered the phone)    Equipment recommendations:  (to be determined)     GOALS:   Occupational Therapy Goals        Problem: Occupational Therapy Goal    Goal Priority Disciplines Outcome Interventions   Occupational Therapy Goal     OT, PT/OT     Description:  Goals to be met by: 5.10.17     Patient will increase functional independence with ADLs by performing:    Feeding with Set-up Assistance.  UE Dressing with Minimal Assistance.  Grooming while seated with Contact Guard Assistance.  Rolling to Right with Set-up Assistance.   Upper extremity exercise program x10 reps per handout, with assistance as needed.                PLAN:  Patient to be seen 5 x/week to address the above listed problems via self-care/home management, therapeutic activities, therapeutic exercises  Plan of Care  expires: 05/01/17  Plan of Care reviewed with: patient         Susan Britany, OT  04/29/2017

## 2017-04-29 NOTE — PROGRESS NOTES
[facility] Medicine  Progress Note    Patient Name: Sharona Acosta  MRN: [id number]  Patient Class: IP- Inpatient   Admission Date: 4/28/2017  Length of Stay: 1 days  Attending Physician: Rina Woodson MD  Primary Care Provider: Vianey Lee MD        Subjective:     Principal Problem:Bacterial pneumonia    HPI:  Sharona Acosta is a 81 wo woman with alcohol abuse, arthritis, atherosclerosis of the aorta, h/o compression fracture of spine, h/o stroke (1997), hypothyroidism, HLD, essential HTN, osteopenia who presented with her daughter who reports the patient has been experiencing 3 days of progressive weakness and confusion.  She normally ambulates independently but she has not been doing so with her walker over the last 3 days. She had a fall and was taken to [facility] where she was treated for a urinary tract infection w/ IV antibiotics and released 3 days ago. No oral antibiotic continued as an outpatient.  Patient currently resides in an independent living facility and there is concern that she is unable to take care of herself at home at this time.  Report of subjective fever.  The history is otherwise limited due to the condition of the patient.     In the emergency department routine laboratory studies and chest x-ray were obtained.  There is evidence of ill-defined bilateral lower interstitial opacities concerning for healthcare associated pneumonia.  She also had hypokalemia.  Her mental status did not improve while in the emergency department.  She is also noted to have a slightlyelevated troponin level.     Hospital medicine has been asked to admit for further evaluation and treatment.         Hospital Course:  She was found to have HCAP likely bacterial with sepsis based on CXR, leukocytosis, Tmax 100.9, and tachycardia. Empiric abx with vanc and cefepime were started. Blood cultures were collected. She had symptomatic improvement.  She had a slightly  elevated troponin. Cardiology was consulted. Echo pending.   PT/OT recommended SNF at discharge.        Review of Systems   Constitutional: Positive for fatigue and fever.   Eyes: Negative for photophobia and visual disturbance.   Respiratory: Positive for cough and shortness of breath.    Cardiovascular: Negative for chest pain and leg swelling.   Gastrointestinal: Negative for abdominal pain, constipation and diarrhea.   Skin: Negative for pallor and rash.   Neurological: Negative for headaches.   Psychiatric/Behavioral: Positive for agitation and confusion.     Objective:     Vital Signs (Most Recent):  Temp: 99.2 °F (37.3 °C) (04/29/17 1738)  Pulse: 80 (04/29/17 1738)  Resp: 20 (04/29/17 1738)  BP: (!) 160/84 (04/29/17 1738)  SpO2: 100 % (04/29/17 1738) Vital Signs (24h Range):  Temp:  [97.3 °F (36.3 °C)-100.9 °F (38.3 °C)] 99.2 °F (37.3 °C)  Pulse:  [] 80  Resp:  [16-20] 20  SpO2:  [89 %-100 %] 100 %  BP: (110-164)/(53-84) 160/84     Weight: 59 kg (130 lb)  Body mass index is 21.63 kg/(m^2).    Intake/Output Summary (Last 24 hours) at 04/29/17 1748  Last data filed at 04/29/17 1230   Gross per 24 hour   Intake              240 ml   Output                0 ml   Net              240 ml      Physical Exam   Constitutional: She is oriented to person, place, and time. She appears well-developed and well-nourished. No distress.   Pleasant, frail-appearing   HENT:   Right Ear: External ear normal.   Left Ear: External ear normal.   Nose: Nose normal.   Mouth/Throat: Oropharynx is clear and moist.   Eyes: Conjunctivae and EOM are normal. Pupils are equal, round, and reactive to light. No scleral icterus.   Neck: Normal range of motion. Neck supple. No thyromegaly present.   Cardiovascular: Normal rate and normal heart sounds.  Exam reveals no friction rub.    No murmur heard.  Pulmonary/Chest: Effort normal and breath sounds normal. No respiratory distress. She has no wheezes. She has no rales.   Abdominal: Soft.  Bowel sounds are normal. She exhibits no distension and no mass. There is no tenderness.   No hepatosplenomegaly   Musculoskeletal: Normal range of motion. She exhibits no edema or deformity.   Lymphadenopathy:     She has no cervical adenopathy.   Neurological: She is alert and oriented to person, place, and time. No cranial nerve deficit.   Skin: Skin is warm and dry. No pallor.   Psychiatric: She has a normal mood and affect. Her behavior is normal. Thought content normal.       Significant Labs:   Blood Culture:   Recent Labs  Lab 04/28/17 1940 04/28/17 1955   LABBLOO No Growth to date No Growth to date     CBC:   Recent Labs  Lab 04/28/17 1940 04/29/17 0405   WBC 18.77* 18.63*   HGB 12.8 13.0   HCT 39.0 39.8    195     CMP:   Recent Labs  Lab 04/28/17 1940 04/29/17 0405    137   K 3.3* 4.1    109   CO2 23 18*    99   BUN 11 12   CREATININE 0.8 0.8   CALCIUM 9.0 8.3*   PROT 7.2  --    ALBUMIN 3.2*  --    BILITOT 0.5  --    ALKPHOS 67  --    AST 17  --    ALT 7*  --    ANIONGAP 10 10   EGFRNONAA >60 >60     Coagulation:   Recent Labs  Lab 04/28/17 1940   INR 1.0   APTT 28.5     Lactic Acid:   Recent Labs  Lab 04/28/17 1940   LACTATE 0.8     Troponin:   Recent Labs  Lab 04/28/17 1940 04/29/17  0045   TROPONINI 0.044* 0.047*     Urine Culture:   Recent Labs  Lab 04/28/17 1945   LABURIN No growth to date     Urine Studies:   Recent Labs  Lab 04/28/17 1945   COLORU Yellow   APPEARANCEUA Clear   PHUR 6.0   SPECGRAV 1.010   PROTEINUA Negative   GLUCUA Negative   KETONESU Negative   BILIRUBINUA Negative   OCCULTUA Negative   NITRITE Negative   UROBILINOGEN Negative   LEUKOCYTESUR Negative       Significant Imaging: CXR reviewed    Assessment/Plan:      * Bacterial pneumonia  Chest x-ray notable for ill-defined bilateral lower lung interstitial opacities, concerning for pneumonia. Initially met sepsis criteria, now resolved.  D/C from [facility] 3 days ago. Chest x-ray  indicates possible underlying history of pulmonary fibrosis.     HCAP (healthcare-associated pneumonia)  Vanc Cefepime. Improving. Follow up BCx.    Neutrophilic leukocytosis  Secondary to healthcare associated pneumonia    Encephalopathy, metabolic  Secondary to sepsis with pneumonia, resolved.    Elevated troponin  Likely cardiac strain associated with increased metabolic demand with acute infection and sepsis. Echo and consult cardiology.     Hypertension, benign  Goal while inpatient is a systolic blood pressure less than 160mmHg. BP currently at goal. Hold home meds and start back as necessary.    Hyperlipidemia  Statin      Hypothyroid  Obtain TSH, free T3, and free T4. Continued current home regimen    H/O: stroke  Stable. Cont ASA and statin.    Mild cognitive impairment  Supportive care    Alcohol use  Chronic alcohol use. Monitor for DTs as benzos may cause AMS as well. Banana bag ordered.      Hypokalemia  Replete and monitor      VTE Risk Mitigation         Ordered     Medium Risk of VTE  Once      04/29/17 0141     Place EVERETTE hose  Until discontinued      04/29/17 0141     Place sequential compression device  Until discontinued      04/29/17 0141          Rina Woodson MD  Department of Hospital Medicine   [facility]

## 2017-04-29 NOTE — ASSESSMENT & PLAN NOTE
Likely cardiac strain associated with increased metabolic demand with acute infection and sepsis. Echo and consult cardiology.

## 2017-04-29 NOTE — SUBJECTIVE & OBJECTIVE
Review of Systems   Constitutional: Positive for fatigue and fever.   Eyes: Negative for photophobia and visual disturbance.   Respiratory: Positive for cough and shortness of breath.    Cardiovascular: Negative for chest pain and leg swelling.   Gastrointestinal: Negative for abdominal pain, constipation and diarrhea.   Skin: Negative for pallor and rash.   Neurological: Negative for headaches.   Psychiatric/Behavioral: Positive for agitation and confusion.     Objective:     Vital Signs (Most Recent):  Temp: 99.2 °F (37.3 °C) (04/29/17 1738)  Pulse: 80 (04/29/17 1738)  Resp: 20 (04/29/17 1738)  BP: (!) 160/84 (04/29/17 1738)  SpO2: 100 % (04/29/17 1738) Vital Signs (24h Range):  Temp:  [97.3 °F (36.3 °C)-100.9 °F (38.3 °C)] 99.2 °F (37.3 °C)  Pulse:  [] 80  Resp:  [16-20] 20  SpO2:  [89 %-100 %] 100 %  BP: (110-164)/(53-84) 160/84     Weight: 59 kg (130 lb)  Body mass index is 21.63 kg/(m^2).    Intake/Output Summary (Last 24 hours) at 04/29/17 1748  Last data filed at 04/29/17 1230   Gross per 24 hour   Intake              240 ml   Output                0 ml   Net              240 ml      Physical Exam   Constitutional: She is oriented to person, place, and time. She appears well-developed and well-nourished. No distress.   Pleasant, frail-appearing   HENT:   Right Ear: External ear normal.   Left Ear: External ear normal.   Nose: Nose normal.   Mouth/Throat: Oropharynx is clear and moist.   Eyes: Conjunctivae and EOM are normal. Pupils are equal, round, and reactive to light. No scleral icterus.   Neck: Normal range of motion. Neck supple. No thyromegaly present.   Cardiovascular: Normal rate and normal heart sounds.  Exam reveals no friction rub.    No murmur heard.  Pulmonary/Chest: Effort normal and breath sounds normal. No respiratory distress. She has no wheezes. She has no rales.   Abdominal: Soft. Bowel sounds are normal. She exhibits no distension and no mass. There is no tenderness.   No  hepatosplenomegaly   Musculoskeletal: Normal range of motion. She exhibits no edema or deformity.   Lymphadenopathy:     She has no cervical adenopathy.   Neurological: She is alert and oriented to person, place, and time. No cranial nerve deficit.   Skin: Skin is warm and dry. No pallor.   Psychiatric: She has a normal mood and affect. Her behavior is normal. Thought content normal.       Significant Labs:   Blood Culture:   Recent Labs  Lab 04/28/17 1940 04/28/17 1955   LABBLOO No Growth to date No Growth to date     CBC:   Recent Labs  Lab 04/28/17 1940 04/29/17 0405   WBC 18.77* 18.63*   HGB 12.8 13.0   HCT 39.0 39.8    195     CMP:   Recent Labs  Lab 04/28/17 1940 04/29/17 0405    137   K 3.3* 4.1    109   CO2 23 18*    99   BUN 11 12   CREATININE 0.8 0.8   CALCIUM 9.0 8.3*   PROT 7.2  --    ALBUMIN 3.2*  --    BILITOT 0.5  --    ALKPHOS 67  --    AST 17  --    ALT 7*  --    ANIONGAP 10 10   EGFRNONAA >60 >60     Coagulation:   Recent Labs  Lab 04/28/17 1940   INR 1.0   APTT 28.5     Lactic Acid:   Recent Labs  Lab 04/28/17 1940   LACTATE 0.8     Troponin:   Recent Labs  Lab 04/28/17 1940 04/29/17  0045   TROPONINI 0.044* 0.047*     Urine Culture:   Recent Labs  Lab 04/28/17 1945   LABURIN No growth to date     Urine Studies:   Recent Labs  Lab 04/28/17 1945   COLORU Yellow   APPEARANCEUA Clear   PHUR 6.0   SPECGRAV 1.010   PROTEINUA Negative   GLUCUA Negative   KETONESU Negative   BILIRUBINUA Negative   OCCULTUA Negative   NITRITE Negative   UROBILINOGEN Negative   LEUKOCYTESUR Negative       Significant Imaging: CXR reviewed

## 2017-04-29 NOTE — ASSESSMENT & PLAN NOTE
Goal while inpatient is a systolic blood pressure less than 160mmHg. BP currently at goal. Hold home meds and start back as necessary.

## 2017-04-29 NOTE — ED TRIAGE NOTES
Pt presents to ED via EMS after family called c/o weakness and altered mental status. Per family, pt is more confused than usual. Pt has had generalized weakness for past few days. Was recently admitted to hospital after fall, pt was admitted for high blood pressure s/p fall. Pt alert and oriented to self and place, disoriented to time and situation. Family at bedside.

## 2017-04-29 NOTE — ASSESSMENT & PLAN NOTE
Pneumonia  · As evidence by history, physical exam, chest x-ray  · Chest x-ray notable for ill-defined bilateral lower lung interstitial opacities, concerning for pneumonia.  D/C from [facility] 3 days ago.  · Chest x-ray indicates possible underlying history of pulmonary fibrosis  · 3 out of 4 SIRS criteria  · Initiate IV antibiotics for community-acquired pneumonia with broad-spectrum antibiotics.    · If clinical improvement is not seen by tomorrow, broaden antibiotic coverage, further tailored by sputum Gram stain and culture results  · Robitussin  · Tessalon Perles  · Chest physiotherapy as needed  · Nebulizer treatments scheduled  · If clinical improvement is not obtained with the treatment above consider ordering PPD, quantiferon gold, histoplasma, blastomycosis urine antigens, aspergillus antigen, cryptococcus antigen, procalcitonin, and/or modified AFB.  · Pulmonology consult

## 2017-04-29 NOTE — PLAN OF CARE
Problem: Physical Therapy Goal  Goal: Physical Therapy Goal  Goals to be met by: 17     Patient will increase functional independence with mobility by performin. Supine to sit with Supervision  2. Rolling to Left and Right with Supervision  3. Sit to stand transfer with Supervision using RW  4. Bed to chair transfer with Supervision using Rolling Walker  5. Gait x 150 feet with Supervision using Rolling Walker   6. Lower extremity exercise program x 20 reps per handout, with supervision  Please assist pt OOB>chair for all meals.

## 2017-04-29 NOTE — ASSESSMENT & PLAN NOTE
Cerebral vascular disease  · No evidence of acute stroke at this time  · Maintain adequate blood pressure control  · Heart healthy diet  · Aspirin  · Statin

## 2017-04-29 NOTE — ASSESSMENT & PLAN NOTE
· Secondary to sepsis with pneumonia as outlined above  · Also has underlying dementia  · Supportive care  · Fall precautions

## 2017-04-29 NOTE — NURSING
Dr. Woodson notified of potassium 4.1 prior to administration of the three k riders ordered. One dose already administered. Okay to discontinue order and not give remaining two doses.

## 2017-04-30 PROBLEM — R01.1 MURMUR, CARDIAC: Status: ACTIVE | Noted: 2017-04-30

## 2017-04-30 PROBLEM — G93.41 ENCEPHALOPATHY, METABOLIC: Status: RESOLVED | Noted: 2017-04-29 | Resolved: 2017-04-30

## 2017-04-30 LAB
ANION GAP SERPL CALC-SCNC: 7 MMOL/L
AORTIC VALVE STENOSIS: ABNORMAL
BACTERIA UR CULT: NO GROWTH
BASOPHILS # BLD AUTO: 0.03 K/UL
BASOPHILS NFR BLD: 0.2 %
BUN SERPL-MCNC: 7 MG/DL
CALCIUM SERPL-MCNC: 7.9 MG/DL
CHLORIDE SERPL-SCNC: 108 MMOL/L
CHOLEST/HDLC SERPL: 2.4 {RATIO}
CO2 SERPL-SCNC: 22 MMOL/L
CREAT SERPL-MCNC: 0.7 MG/DL
DIASTOLIC DYSFUNCTION: YES
DIFFERENTIAL METHOD: ABNORMAL
E COLI SXT1 STL QL IA: NEGATIVE
E COLI SXT2 STL QL IA: NEGATIVE
EOSINOPHIL # BLD AUTO: 0.3 K/UL
EOSINOPHIL NFR BLD: 1.7 %
ERYTHROCYTE [DISTWIDTH] IN BLOOD BY AUTOMATED COUNT: 16.6 %
EST. GFR  (AFRICAN AMERICAN): >60 ML/MIN/1.73 M^2
EST. GFR  (NON AFRICAN AMERICAN): >60 ML/MIN/1.73 M^2
ESTIMATED PA SYSTOLIC PRESSURE: 49.99
GLOBAL PERICARDIAL EFFUSION: ABNORMAL
GLUCOSE SERPL-MCNC: 83 MG/DL
HCT VFR BLD AUTO: 34 %
HDL/CHOLESTEROL RATIO: 41.6 %
HDLC SERPL-MCNC: 101 MG/DL
HDLC SERPL-MCNC: 42 MG/DL
HGB BLD-MCNC: 11 G/DL
LDLC SERPL CALC-MCNC: 47 MG/DL
LYMPHOCYTES # BLD AUTO: 3.5 K/UL
LYMPHOCYTES NFR BLD: 23.6 %
MAGNESIUM SERPL-MCNC: 1.5 MG/DL
MCH RBC QN AUTO: 28.9 PG
MCHC RBC AUTO-ENTMCNC: 32.4 %
MCV RBC AUTO: 89 FL
MITRAL VALVE MOBILITY: NORMAL
MITRAL VALVE REGURGITATION: ABNORMAL
MONOCYTES # BLD AUTO: 1.6 K/UL
MONOCYTES NFR BLD: 10.6 %
NEUTROPHILS # BLD AUTO: 9.5 K/UL
NEUTROPHILS NFR BLD: 63.9 %
NONHDLC SERPL-MCNC: 59 MG/DL
PLATELET # BLD AUTO: 232 K/UL
PLATELET BLD QL SMEAR: ABNORMAL
PMV BLD AUTO: 11.8 FL
POTASSIUM SERPL-SCNC: 3.5 MMOL/L
RBC # BLD AUTO: 3.81 M/UL
RETIRED EF AND QEF - SEE NOTES: 55 (ref 55–65)
SODIUM SERPL-SCNC: 137 MMOL/L
TRICUSPID VALVE REGURGITATION: ABNORMAL
TRIGL SERPL-MCNC: 60 MG/DL
WBC # BLD AUTO: 14.97 K/UL

## 2017-04-30 PROCEDURE — 85025 COMPLETE CBC W/AUTO DIFF WBC: CPT

## 2017-04-30 PROCEDURE — 97110 THERAPEUTIC EXERCISES: CPT

## 2017-04-30 PROCEDURE — 97116 GAIT TRAINING THERAPY: CPT

## 2017-04-30 PROCEDURE — 87493 C DIFF AMPLIFIED PROBE: CPT

## 2017-04-30 PROCEDURE — 93306 TTE W/DOPPLER COMPLETE: CPT | Mod: 26,,, | Performed by: INTERNAL MEDICINE

## 2017-04-30 PROCEDURE — 36415 COLL VENOUS BLD VENIPUNCTURE: CPT

## 2017-04-30 PROCEDURE — 12000002 HC ACUTE/MED SURGE SEMI-PRIVATE ROOM

## 2017-04-30 PROCEDURE — 86580 TB INTRADERMAL TEST: CPT | Performed by: INTERNAL MEDICINE

## 2017-04-30 PROCEDURE — 94761 N-INVAS EAR/PLS OXIMETRY MLT: CPT

## 2017-04-30 PROCEDURE — 93306 TTE W/DOPPLER COMPLETE: CPT

## 2017-04-30 PROCEDURE — 25000003 PHARM REV CODE 250: Performed by: EMERGENCY MEDICINE

## 2017-04-30 PROCEDURE — 25000003 PHARM REV CODE 250: Performed by: HOSPITALIST

## 2017-04-30 PROCEDURE — 80048 BASIC METABOLIC PNL TOTAL CA: CPT

## 2017-04-30 PROCEDURE — 63600175 PHARM REV CODE 636 W HCPCS: Performed by: INTERNAL MEDICINE

## 2017-04-30 PROCEDURE — 25000242 PHARM REV CODE 250 ALT 637 W/ HCPCS: Performed by: HOSPITALIST

## 2017-04-30 PROCEDURE — 80061 LIPID PANEL: CPT

## 2017-04-30 PROCEDURE — 83735 ASSAY OF MAGNESIUM: CPT

## 2017-04-30 PROCEDURE — 63600175 PHARM REV CODE 636 W HCPCS: Performed by: HOSPITALIST

## 2017-04-30 PROCEDURE — 94640 AIRWAY INHALATION TREATMENT: CPT

## 2017-04-30 PROCEDURE — 99223 1ST HOSP IP/OBS HIGH 75: CPT | Mod: ,,, | Performed by: INTERNAL MEDICINE

## 2017-04-30 RX ORDER — MOXIFLOXACIN HYDROCHLORIDE 400 MG/250ML
400 INJECTION, SOLUTION INTRAVENOUS
Status: DISCONTINUED | OUTPATIENT
Start: 2017-04-30 | End: 2017-05-02

## 2017-04-30 RX ADMIN — IPRATROPIUM BROMIDE AND ALBUTEROL SULFATE 3 ML: .5; 3 SOLUTION RESPIRATORY (INHALATION) at 12:04

## 2017-04-30 RX ADMIN — GUAIFENESIN AND DEXTROMETHORPHAN 10 ML: 100; 10 SYRUP ORAL at 12:04

## 2017-04-30 RX ADMIN — MOXIFLOXACIN HYDROCHLORIDE 400 MG: 400 INJECTION, SOLUTION INTRAVENOUS at 12:04

## 2017-04-30 RX ADMIN — SODIUM CHLORIDE: 0.9 INJECTION, SOLUTION INTRAVENOUS at 09:04

## 2017-04-30 RX ADMIN — SODIUM CHLORIDE: 0.9 INJECTION, SOLUTION INTRAVENOUS at 06:04

## 2017-04-30 RX ADMIN — GUAIFENESIN AND DEXTROMETHORPHAN 10 ML: 100; 10 SYRUP ORAL at 05:04

## 2017-04-30 RX ADMIN — VERAPAMIL HYDROCHLORIDE 180 MG: 180 TABLET, FILM COATED, EXTENDED RELEASE ORAL at 08:04

## 2017-04-30 RX ADMIN — CEFEPIME 1 G: 1 INJECTION, POWDER, FOR SOLUTION INTRAMUSCULAR; INTRAVENOUS at 12:04

## 2017-04-30 RX ADMIN — LEVOTHYROXINE SODIUM 75 MCG: 75 TABLET ORAL at 08:04

## 2017-04-30 RX ADMIN — IPRATROPIUM BROMIDE AND ALBUTEROL SULFATE 3 ML: .5; 3 SOLUTION RESPIRATORY (INHALATION) at 07:04

## 2017-04-30 RX ADMIN — THIAMINE HCL TAB 100 MG 100 MG: 100 TAB at 08:04

## 2017-04-30 RX ADMIN — SIMVASTATIN 40 MG: 40 TABLET, FILM COATED ORAL at 09:04

## 2017-04-30 RX ADMIN — Medication 5 UNITS: at 03:04

## 2017-04-30 RX ADMIN — GUAIFENESIN AND DEXTROMETHORPHAN 10 ML: 100; 10 SYRUP ORAL at 06:04

## 2017-04-30 RX ADMIN — ASPIRIN 81 MG CHEWABLE TABLET 81 MG: 81 TABLET CHEWABLE at 08:04

## 2017-04-30 NOTE — PT/OT/SLP PROGRESS
"Physical Therapy  Treatment    Sharona Acosta   MRN: [id number]   Admitting Diagnosis: Bacterial pneumonia    PT Received On: 17  PT Start Time: 1037     PT Stop Time: 1101    PT Total Time (min): 24 min       Billable Minutes:  Gait Cjeadser35 and Therapeutic Exercise 10    Treatment Type: Treatment  PT/PTA: PTA     PTA Visit Number: 1       General Precautions: Standard, fall, contact  Orthopedic Precautions: N/A   Braces:           Subjective:  Communicated with pt's nurse, Farida, prior to session.  Pt L sidelying upon entering room.  Pt states " Sure I will."    Pain Ratin/10                   Objective:   Patient found with: telemetry, peripheral IV    Functional Mobility:  Bed Mobility:   Rolling/Turning Right: Stand by assistance, With side rail  Scooting/Bridging: Stand by Assistance  Supine to Sit: Contact Guard Assistance  Sit to Supine: With leg lift, Contact Guard Assistance    Transfers:  Sit <> Stand Assistance: Contact Guard Assistance  Sit <> Stand Assistive Device: Rolling Walker    Gait:   Gait Distance: ~20ft,~20ft with seated rest break in b/w.  Pt requires cues for upright posture.  Ambulated in room 2/2 pt's stool being loose.  Assistance 1: Contact Guard Assistance  Gait Assistive Device: Rolling walker  Gait Pattern: swing-to gait  Gait Deviation(s): decreased eliza, increased time in double stance, decreased velocity of limb motion, decreased step length, decreased toe-to-floor clearance, decreased swing-to-stance ratio, decreased weight-shifting ability        Balance:   Static Sit: FAIR+: Able to take MINIMAL challenges from all directions  Dynamic Sit: FAIR+: Maintains balance through MINIMAL excursions of active trunk motion  Static Stand: FAIR: Maintains without assist but unable to take challenges  Dynamic stand: FAIR: Needs CONTACT GUARD during gait     Therapeutic Activities and Exercises:  Pt performed seated therex to BLEs x15 reps including: hip flexion, LAQs, APs, " knee flexion     AM-PAC 6 CLICK MOBILITY  How much help from another person does this patient currently need?   1 = Unable, Total/Dependent Assistance  2 = A lot, Maximum/Moderate Assistance  3 = A little, Minimum/Contact Guard/Supervision  4 = None, Modified [place]/Independent         AM-PAC Raw Score CMS G-Code Modifier Level of Impairment Assistance   6 % Total / Unable   7 - 9 CM 80 - 100% Maximal Assist   10 - 14 CL 60 - 80% Moderate Assist   15 - 19 CK 40 - 60% Moderate Assist   20 - 22 CJ 20 - 40% Minimal Assist   23 CI 1-20% SBA / CGA   24 CH 0% Independent/ Mod I     Patient left right sidelying with all lines intact, call button in reach, bed alarm on and pt's nurse, Farida, notified.    Assessment:  Sharona Acosta is a 81 y.o. female with a medical diagnosis of Bacterial pneumonia and presents with improved OOB mobility and increased gait distance.    Rehab identified problem list/impairments: Rehab identified problem list/impairments: weakness, impaired endurance, decreased coordination, decreased lower extremity function, decreased upper extremity function, impaired functional mobilty, impaired self care skills, gait instability, decreased safety awareness    Rehab potential is good.    Activity tolerance: Good    Discharge recommendations: Discharge Facility/Level Of Care Needs: home health PT (with 24 hour supervision)     Barriers to discharge:      Equipment recommendations: Equipment Needed After Discharge: bedside commode     GOALS:   Physical Therapy Goals        Problem: Physical Therapy Goal    Goal Priority Disciplines Outcome Goal Variances Interventions   Physical Therapy Goal     PT/OT, PT      Description:  Goals to be met by: 17     Patient will increase functional independence with mobility by performin. Supine to sit with Supervision  2. Rolling to Left and Right with Supervision  3. Sit to stand transfer with Supervision using RW  4. Bed to chair transfer  with Supervision using Rolling Walker  5. Gait  x 150 feet with Supervision using Rolling Walker   6. Lower extremity exercise program x 20 reps per handout, with supervision                PLAN:    Patient to be seen daily  to address the above listed problems via gait training, therapeutic activities, therapeutic exercises  Plan of Care expires: 05/13/17  Plan of Care reviewed with: patient         Jana Kennedy, PTA  04/30/2017

## 2017-04-30 NOTE — PLAN OF CARE
Problem: Patient Care Overview  Goal: Plan of Care Review  Outcome: Ongoing (interventions implemented as appropriate)    04/30/17 1198   Coping/Psychosocial   Plan Of Care Reviewed With patient      Patient remains free from falls, trauma, and injuries throughout shift.  No complaints of pain, nausea, or vomiting.  IV fluids, IV antibiotics, and medications administered as prescribed.  Turned q2 to prevent pressure ulcers.  Heels floated; TEDs and SCDs on.  Contact precautions maintained. Vital Signs Stable; afebrile throughout shift.  Telemetry monitor on and alarm audible.   No acute distress noted.

## 2017-04-30 NOTE — PLAN OF CARE
Problem: Patient Care Overview  Goal: Plan of Care Review  Outcome: Ongoing (interventions implemented as appropriate)    04/29/17 1925   Coping/Psychosocial   Plan Of Care Reviewed With patient      Patient alert to self and place; was able to verbalize who the president was but did not know the date. Patient remained free from falls, trauma, and injuries throughout shift.  No complaints of pain, nausea, or vomiting.  IV fluids and medications administered as prescribed. Vital Signs Stable; afebrile throughout shift.  Worked with PT/OT; tolerated well. Contact precautions maintained. Telemetry monitor on and alarms audible. No acute distress noted.

## 2017-04-30 NOTE — ASSESSMENT & PLAN NOTE
Asymptomatic  Ischemic EKG changes noted vs prior tracing  Cannot exclude demand  Check echo  Will plan eventual MPI (may be as outpatient)

## 2017-04-30 NOTE — SUBJECTIVE & OBJECTIVE
Review of Systems   Constitutional: Positive for fatigue. Negative for fever.   Eyes: Negative for photophobia and visual disturbance.   Respiratory: Positive for cough and shortness of breath.    Cardiovascular: Negative for chest pain and leg swelling.   Gastrointestinal: Negative for abdominal pain, constipation and diarrhea.   Skin: Negative for pallor and rash.   Neurological: Negative for headaches.   Psychiatric/Behavioral: Positive for confusion. Negative for agitation and behavioral problems.     Objective:     Vital Signs (Most Recent):  Temp: 97.7 °F (36.5 °C) (04/30/17 1234)  Pulse: 78 (04/30/17 1234)  Resp: 18 (04/30/17 1234)  BP: (!) 129/51 (04/30/17 1234)  SpO2: (!) 93 % (04/30/17 1234) Vital Signs (24h Range):  Temp:  [97.7 °F (36.5 °C)-99.2 °F (37.3 °C)] 97.7 °F (36.5 °C)  Pulse:  [67-80] 78  Resp:  [16-20] 18  SpO2:  [93 %-100 %] 93 %  BP: (115-144)/(51-71) 129/51     Weight: 59 kg (130 lb)  Body mass index is 21.63 kg/(m^2).    Intake/Output Summary (Last 24 hours) at 04/30/17 1251  Last data filed at 04/30/17 0936   Gross per 24 hour   Intake              360 ml   Output                0 ml   Net              360 ml      Physical Exam   Constitutional: She appears well-developed and well-nourished. No distress.   Pleasant, frail-appearing   HENT:   Right Ear: External ear normal.   Left Ear: External ear normal.   Nose: Nose normal.   Mouth/Throat: Oropharynx is clear and moist.   Eyes: Conjunctivae and EOM are normal. Pupils are equal, round, and reactive to light. No scleral icterus.   Neck: Normal range of motion. Neck supple. No thyromegaly present.   Cardiovascular: Normal rate.  Exam reveals no friction rub.    Murmur heard.  Pulmonary/Chest: Effort normal and breath sounds normal. No respiratory distress. She has no wheezes. She has no rales.   Abdominal: Soft. Bowel sounds are normal. She exhibits no distension and no mass. There is no tenderness.   No hepatosplenomegaly    Musculoskeletal: Normal range of motion. She exhibits no edema or deformity.   Lymphadenopathy:     She has no cervical adenopathy.   Neurological: She is alert. No cranial nerve deficit.   Knows she is in the hospital. She tells me the year is 19 something and that the president is Lito Chavez.   Skin: Skin is warm and dry. No pallor.       Significant Labs:   CBC:   Recent Labs  Lab 04/28/17 1940 04/29/17 0405 04/30/17 0456   WBC 18.77* 18.63* 14.97*   HGB 12.8 13.0 11.0*   HCT 39.0 39.8 34.0*    195 232     CMP:   Recent Labs  Lab 04/28/17 1940 04/29/17 0405 04/30/17 0456    137 137   K 3.3* 4.1 3.5    109 108   CO2 23 18* 22*    99 83   BUN 11 12 7*   CREATININE 0.8 0.8 0.7   CALCIUM 9.0 8.3* 7.9*   PROT 7.2  --   --    ALBUMIN 3.2*  --   --    BILITOT 0.5  --   --    ALKPHOS 67  --   --    AST 17  --   --    ALT 7*  --   --    ANIONGAP 10 10 7*   EGFRNONAA >60 >60 >60

## 2017-04-30 NOTE — NURSING
Sharona Acosta medical record number [id number] has been verified by JANIA Moses and ELODIA Green to be wearing tele box [id number]. Rhythm and correct information is visible on the monitor.

## 2017-04-30 NOTE — PLAN OF CARE
04/30/17 1115   Discharge Assessment   Assessment Type Discharge Planning Assessment   Confirmed/corrected address and phone number on facesheet? Yes   Assessment information obtained from? Patient;Caregiver   Communicated expected length of stay with patient/caregiver yes   If Healthcare Directive is received, is it scanned into Epic? no (comment)   Prior to hospitilization cognitive status: Alert/Oriented;No Deficits   Prior to hospitalization functional status: Assistive Equipment;Needs Assistance   Current cognitive status: Alert/Oriented   Current Functional Status: Assistive Equipment;Needs Assistance   Arrived From admitted as an inpatient;assisted living   Lives With alone   Able to Return to Prior Arrangements yes   Is patient able to care for self after discharge? Unable to determine at this time (comments)   Patient's perception of discharge disposition admitted as an inpatient   Readmission Within The Last 30 Days no previous admission in last 30 days   Patient currently being followed by outpatient case management? No   Patient currently receives home health services? No   Does the patient currently use HME? Yes   Patient currently receives private duty nursing? No   Patient currently receives any other outside agency services? No   Equipment Currently Used at Home walker, rolling;shower chair;wheelchair   Do you have any problems affording any of your prescribed medications? No   Is the patient taking medications as prescribed? yes   Do you have any financial concerns preventing you from receiving the healthcare you need? No   Does the patient have transportation to healthcare appointments? Yes   Transportation Available family or friend will provide   On Dialysis? No   Does the patient receive services at the [facility]? No   Are there any open cases? No   Discharge Plan A Assisted Living   Discharge Plan B Other  (TBD)   Patient/Family In Agreement With Plan yes   At [facility]  "Living.  TN completed discharge needs assessment. TN provided and reviewed with patient "Blue My Health Packet" , "Help At Home" and "Discharge Planning Begins on Admission" handouts. TN discussed with patient the things the patient is responsible for to manage patient's  healthcare at home. Patient verbalized understanding & teachback implemented.       [facility] [id number]  BERNADINE MERRITT  [address] [facility] [address] [facility] [address] [place]  [address]  [place] [address]  Phone: [phone] Fax: [phone]    [facility] [id number]  [place] [address] AT [address]  [place] [address]  Phone: [phone] Fax: [phone]      "

## 2017-04-30 NOTE — ASSESSMENT & PLAN NOTE
Likely cardiac strain associated with increased metabolic demand with acute infection and sepsis. Echo pending. Appreciate cardiology recs.

## 2017-04-30 NOTE — PT/OT/SLP PROGRESS
Physical Therapy      Sharona Acosta  MRN: [id number]    Patient not seen today secondary to pt on contact precautions and eating breakfast.  Don't want to interrupt pt 2/2 not being able to reheat food. Will follow-up as able.    Jana Kennedy, PTA

## 2017-04-30 NOTE — SUBJECTIVE & OBJECTIVE
Past Medical History:   Diagnosis Date    Alcohol abuse     Arthritis     osteoarthritis    Atherosclerosis of aorta     noted on lumbar X-ray 4/1/2013    Back pain     H/O: compression fracture of spine     H/O: stroke 1997    h/o stroke more than 15 yrs ago with no residual    Hyperlipidemia     Hypertension     Hypothyroidism     Leukocytosis     Neutrophilic leukocytosis 4/29/2017    Normal vaginal delivery     x2    Osteopenia     Sepsis due to HCAP pneumonia 4/29/2017    Thyroid disease     hypothyroidism    Urinary incontinence     Vitamin B 12 deficiency     Vitamin D deficiency        Past Surgical History:   Procedure Laterality Date    BREAST BIOPSY      FIXATION KYPHOPLASTY  Sept.  2012    HYSTERECTOMY      JOINT REPLACEMENT      right hip replacement    TOTAL HIP ARTHROPLASTY         Review of patient's allergies indicates:   Allergen Reactions    Lipitor [atorvastatin] Nausea Only       No current facility-administered medications on file prior to encounter.      Current Outpatient Prescriptions on File Prior to Encounter   Medication Sig    alendronate (FOSAMAX) 70 MG tablet TAKE 1 TABLET BY MOUTH EVERY 7 DAYS    levothyroxine (SYNTHROID) 75 MCG tablet TAKE 1 TABLET BY MOUTH EVERY DAY    simvastatin (ZOCOR) 40 MG tablet TAKE 1 TABLET BY MOUTH EVERY EVENING    verapamil (VERELAN) 240 MG C24P TAKE 1 CAPSULE BY MOUTH EVERY DAY    acetaminophen (TYLENOL) 325 MG tablet Take 2 tablets (650 mg total) by mouth every 8 (eight) hours as needed (Mild pain or T>100.4).    aspirin 81 MG Chew Take 81 mg by mouth once daily.      diazepam (VALIUM) 2 MG tablet Take 1 tab bid for 2 days, then 1 tab daily for 2 days then stop.    hydrocodone-acetaminophen 5-325mg (NORCO) 5-325 mg per tablet Take 1 tablet by mouth every 8 (eight) hours as needed (Severe pain).    polyethylene glycol (GLYCOLAX) 17 gram PwPk Take 17 g by mouth once daily.     Family History     Problem Relation (Age of  Onset)    Hypertension Mother, Father    Stroke Mother        Social History Main Topics    Smoking status: Former Smoker     Types: Cigarettes     Quit date: 5/15/1997    Smokeless tobacco: Never Used    Alcohol use 12.6 oz/week     21 Shots of liquor per week      Comment: occasional     Drug use: No    Sexual activity: Not on file     Review of Systems   Constitution: Negative for chills, diaphoresis, fever, weakness and malaise/fatigue.   HENT: Negative for headaches and nosebleeds.    Eyes: Negative for blurred vision and double vision.   Cardiovascular: Negative for chest pain, claudication, cyanosis, dyspnea on exertion, leg swelling, orthopnea, palpitations, paroxysmal nocturnal dyspnea and syncope.   Respiratory: Negative for cough, shortness of breath and wheezing.    Skin: Negative for dry skin and poor wound healing.   Musculoskeletal: Negative for back pain, joint swelling and myalgias.   Gastrointestinal: Negative for abdominal pain, nausea and vomiting.   Genitourinary: Negative for hematuria.   Neurological: Negative for dizziness, numbness and seizures.   Psychiatric/Behavioral: Negative for altered mental status and depression.     Objective:     Vital Signs (Most Recent):  Temp: 98.3 °F (36.8 °C) (04/30/17 0759)  Pulse: 69 (Simultaneous filing. User may not have seen previous data.) (04/30/17 0759)  Resp: 20 (Simultaneous filing. User may not have seen previous data.) (04/30/17 0759)  BP: 133/60 (04/30/17 0759)  SpO2: 97 % (Simultaneous filing. User may not have seen previous data.) (04/30/17 0759) Vital Signs (24h Range):  Temp:  [97.7 °F (36.5 °C)-99.2 °F (37.3 °C)] 98.3 °F (36.8 °C)  Pulse:  [67-80] 69  Resp:  [16-20] 20  SpO2:  [93 %-100 %] 97 %  BP: (115-144)/(56-71) 133/60     Weight: 59 kg (130 lb)  Body mass index is 21.63 kg/(m^2).    SpO2: 97 % (Simultaneous filing. User may not have seen previous data.)  O2 Device (Oxygen Therapy): room air      Intake/Output Summary (Last 24  hours) at 04/30/17 1005  Last data filed at 04/30/17 0936   Gross per 24 hour   Intake              480 ml   Output                0 ml   Net              480 ml       Lines/Drains/Airways     Pressure Ulcer                 Pressure Ulcer 06/03/16 0739 Right ear, right Stage  days         Pressure Ulcer 06/03/16 0741 Right heel suspected deep tissue injury 331 days          Peripheral Intravenous Line                 Peripheral IV - Single Lumen 04/29/17 Left Antecubital 1 day                Physical Exam   Constitutional: She is oriented to person, place, and time. She appears well-developed and well-nourished. No distress.   HENT:   Head: Normocephalic and atraumatic.   Mouth/Throat: No oropharyngeal exudate.   Eyes: Conjunctivae and EOM are normal. Pupils are equal, round, and reactive to light. No scleral icterus.   Neck: Normal range of motion. Neck supple. No JVD present. No tracheal deviation present.   Cardiovascular: Normal rate, regular rhythm, S1 normal and S2 normal.  Exam reveals no gallop and no friction rub.    Murmur heard.   Early systolic murmur is present with a grade of 1/6   Pulmonary/Chest: Effort normal and breath sounds normal. No respiratory distress. She has no wheezes. She has no rales. She exhibits no tenderness.   Abdominal: Soft. Bowel sounds are normal. There is no tenderness.   Musculoskeletal: Normal range of motion. She exhibits no edema.   Neurological: She is alert and oriented to person, place, and time. No cranial nerve deficit.   ?memory impairment   Skin: Skin is warm and dry. She is not diaphoretic.   Psychiatric: She has a normal mood and affect. Her behavior is normal. Judgment normal.       Current Medications:   albuterol-ipratropium 2.5mg-0.5mg/3mL  3 mL Nebulization Q6H    aspirin  81 mg Oral Daily    ceFEPime (MAXIPIME) IVPB  1 g Intravenous Q24H    dextromethorphan-guaifenesin  mg/5 ml  10 mL Oral Q6H    levothyroxine  75 mcg Oral Daily     simvastatin  40 mg Oral QHS    thiamine  100 mg Oral Daily    vancomycin (VANCOCIN) IVPB  1,000 mg Intravenous Q24H    verapamil  180 mg Oral Daily      sodium chloride 0.9% 75 mL/hr at 04/29/17 2157     acetaminophen, acetaminophen, benzonatate, flu vacc [id number] 65yr up(PF), morphine, morphine, ondansetron    Laboratory:  CBC:    Recent Labs  Lab 04/28/17 1940 04/29/17 0405 04/30/17  0456   WHITE BLOOD CELL COUNT 18.77 H 18.63 H 14.97 H   HEMOGLOBIN 12.8 13.0 11.0 L   HEMATOCRIT 39.0 39.8 34.0 L   PLATELETS 265 195 232       CHEMISTRIES:    Recent Labs  Lab 04/28/17 1940 04/29/17 0405 04/30/17  0456   GLUCOSE 109 99 83   SODIUM 138 137 137   POTASSIUM 3.3 L 4.1 3.5   BUN BLD 11 12 7 L   CREATININE 0.8 0.8 0.7   EGFR IF  >60 >60 >60   EGFR IF NON- >60 >60 >60   CALCIUM 9.0 8.3 L 7.9 L   MAGNESIUM  --   --  1.5 L       CARDIAC BIOMARKERS:    Recent Labs  Lab 06/01/16 1830 04/28/17  1940 04/29/17  0045   CPK 51  --   --    TROPONIN I  --  0.044 H 0.047 H       COAGS:    Recent Labs  Lab 06/01/16 1830 04/28/17 1940   INR 1.0 1.0       LIPIDS/LFTS:    Recent Labs  Lab 09/30/15  1036 06/01/16 1830 04/28/17  1940   CHOLESTEROL 176  --   --    TRIGLYCERIDES 79  --   --    HDL 79 H  --   --    LDL CHOLESTEROL 81.2  --   --    NON-HDL CHOLESTEROL 97  --   --    AST 23 21 17   ALT 12 11 7 L           Diagnostic Results:  ECG (personally reviewed tracings):   4/28/17 1917 , LAD/LVH, PRWP, TWI antlat/inf ?isch.  TWI new vs 6/2/16    Chest X-Ray (personally reviewed image(s)): 4/28/17  Increased ill-defined bilateral lower lung zone interstitial opacities, suspicious for edema or infectious/inflammatory disease.    Echo: pending

## 2017-04-30 NOTE — ASSESSMENT & PLAN NOTE
Chest x-ray notable for ill-defined bilateral lower lung interstitial opacities, concerning for pneumonia. Initially met sepsis criteria, now resolved.  D/C from [facility] 3 days ago. Chest x-ray indicates possible underlying history of pulmonary fibrosis.

## 2017-04-30 NOTE — PLAN OF CARE
Problem: Patient Care Overview  Goal: Plan of Care Review  Outcome: Ongoing (interventions implemented as appropriate)  Pt more alert and oriented this shift vs previous PM shift. IV fluids and antibiotics administered as ordered. Respiratory treatments q4h. VSSAF. Bed alarm armed and audible. Remains free from falls or further trauma.

## 2017-04-30 NOTE — PROGRESS NOTES
[facility]  Progress Note    Patient Name: Sharona Acosta  MRN: [id number]  Patient Class: IP- Inpatient   Admission Date: 4/28/2017  Length of Stay: 2 days  Attending Physician: Rina Woodson MD  Primary Care Provider: Vianey Lee MD        Subjective:     Principal Problem:Bacterial pneumonia    HPI:  Sharona Acosta is a 81 wo woman with alcohol abuse, arthritis, atherosclerosis of the aorta, h/o compression fracture of spine, h/o stroke (1997), hypothyroidism, HLD, essential HTN, osteopenia who presented with her daughter who reports the patient has been experiencing 3 days of progressive weakness and confusion.  She normally ambulates independently but she has not been doing so with her walker over the last 3 days. She had a fall and was taken to [facility] where she was treated for a urinary tract infection w/ IV antibiotics and released 3 days ago. No oral antibiotic continued as an outpatient.  Patient currently resides in an independent living facility and there is concern that she is unable to take care of herself at home at this time.  Report of subjective fever.  The history is otherwise limited due to the condition of the patient.     In the emergency department routine laboratory studies and chest x-ray were obtained.  There is evidence of ill-defined bilateral lower interstitial opacities concerning for healthcare associated pneumonia.  She also had hypokalemia.  Her mental status did not improve while in the emergency department.  She is also noted to have a slightlyelevated troponin level.     Hospital medicine has been asked to admit for further evaluation and treatment.         Hospital Course:  She was found to have HCAP likely bacterial with sepsis based on CXR, leukocytosis, Tmax 100.9, and tachycardia. Empiric abx with vanc and cefepime were started. Blood cultures were collected. She had quick symptomatic improvement. Abx were deescalated  to moxifloxacin on 4/30.  She had a slightly elevated troponin. Cardiology consulted. Echo pending.   PT/OT recommended SNF at discharge.      Review of Systems   Constitutional: Positive for fatigue. Negative for fever.   Eyes: Negative for photophobia and visual disturbance.   Respiratory: Positive for cough and shortness of breath.    Cardiovascular: Negative for chest pain and leg swelling.   Gastrointestinal: Negative for abdominal pain, constipation and diarrhea.   Skin: Negative for pallor and rash.   Neurological: Negative for headaches.   Psychiatric/Behavioral: Positive for confusion. Negative for agitation and behavioral problems.     Objective:     Vital Signs (Most Recent):  Temp: 97.7 °F (36.5 °C) (04/30/17 1234)  Pulse: 78 (04/30/17 1234)  Resp: 18 (04/30/17 1234)  BP: (!) 129/51 (04/30/17 1234)  SpO2: (!) 93 % (04/30/17 1234) Vital Signs (24h Range):  Temp:  [97.7 °F (36.5 °C)-99.2 °F (37.3 °C)] 97.7 °F (36.5 °C)  Pulse:  [67-80] 78  Resp:  [16-20] 18  SpO2:  [93 %-100 %] 93 %  BP: (115-144)/(51-71) 129/51     Weight: 59 kg (130 lb)  Body mass index is 21.63 kg/(m^2).    Intake/Output Summary (Last 24 hours) at 04/30/17 1251  Last data filed at 04/30/17 0936   Gross per 24 hour   Intake              360 ml   Output                0 ml   Net              360 ml      Physical Exam   Constitutional: She appears well-developed and well-nourished. No distress.   Pleasant, frail-appearing   HENT:   Right Ear: External ear normal.   Left Ear: External ear normal.   Nose: Nose normal.   Mouth/Throat: Oropharynx is clear and moist.   Eyes: Conjunctivae and EOM are normal. Pupils are equal, round, and reactive to light. No scleral icterus.   Neck: Normal range of motion. Neck supple. No thyromegaly present.   Cardiovascular: Normal rate.  Exam reveals no friction rub.    Murmur heard.  Pulmonary/Chest: Effort normal and breath sounds normal. No respiratory distress. She has no wheezes. She has no rales.    Abdominal: Soft. Bowel sounds are normal. She exhibits no distension and no mass. There is no tenderness.   No hepatosplenomegaly   Musculoskeletal: Normal range of motion. She exhibits no edema or deformity.   Lymphadenopathy:     She has no cervical adenopathy.   Neurological: She is alert. No cranial nerve deficit.   Knows she is in the hospital. She tells me the year is 19 something and that the president is Lito Chavez.   Skin: Skin is warm and dry. No pallor.       Significant Labs:   CBC:   Recent Labs  Lab 04/28/17 1940 04/29/17 0405 04/30/17 0456   WBC 18.77* 18.63* 14.97*   HGB 12.8 13.0 11.0*   HCT 39.0 39.8 34.0*    195 232     CMP:   Recent Labs  Lab 04/28/17 1940 04/29/17 0405 04/30/17 0456    137 137   K 3.3* 4.1 3.5    109 108   CO2 23 18* 22*    99 83   BUN 11 12 7*   CREATININE 0.8 0.8 0.7   CALCIUM 9.0 8.3* 7.9*   PROT 7.2  --   --    ALBUMIN 3.2*  --   --    BILITOT 0.5  --   --    ALKPHOS 67  --   --    AST 17  --   --    ALT 7*  --   --    ANIONGAP 10 10 7*   EGFRNONAA >60 >60 >60     Assessment/Plan:      * Bacterial pneumonia  Chest x-ray notable for ill-defined bilateral lower lung interstitial opacities, concerning for pneumonia. Initially met sepsis criteria, now resolved.  D/C from [facility] 3 days ago. Chest x-ray indicates possible underlying history of pulmonary fibrosis.     HCAP (healthcare-associated pneumonia)  Vanc Cefepime deescalated to moxifloxacin. Improving. BCx NGTD.    Neutrophilic leukocytosis  Secondary to healthcare associated pneumonia    Encephalopathy, metabolic, resolved as of 4/30/2017  Secondary to sepsis with pneumonia, resolved.    Elevated troponin  Likely cardiac strain associated with increased metabolic demand with acute infection and sepsis. Echo pending. Appreciate cardiology recs.     Hypertension, benign  Goal while inpatient is a systolic blood pressure less than 160mmHg. BP currently at goal. Hold home  meds and start back as necessary.    Hyperlipidemia  Statin      Hypothyroid  Continued current home regimen. TFT with low T3 but otherwise normal.    H/O: stroke  Stable. Cont ASA and statin.    Mild cognitive impairment  Supportive care    Alcohol use  Chronic alcohol use. Monitor for DTs as benzos may cause AMS as well. Banana bag ordered.      Hypokalemia  Replete and monitor      Murmur, cardiac  1/6 early systolic. Follow up echo.    VTE Risk Mitigation         Ordered     Medium Risk of VTE  Once      04/29/17 0141     Place EVERETTE hose  Until discontinued      04/29/17 0141     Place sequential compression device  Until discontinued      04/29/17 0141          Rina Woodson MD  Department of Hospital Medicine   [facility]

## 2017-04-30 NOTE — CONSULTS
[facility]  Cardiology  Consult Note    Patient Name: Sharona Acosta  MRN: [id number]  Admission Date: 4/28/2017  Hospital Length of Stay: 2 days  Code Status: Full Code   Attending Provider: Rina Andujar MD   Consulting Provider: Robert Trujillo MD  Primary Care Physician: Vianey Lee MD  Principal Problem:Bacterial pneumonia    Patient information was obtained from patient and ER records.     Inpatient consult to Cardiology  Consult performed by: ROBERT TRUJILLO  Consult ordered by: RINA ANDUJAR  Reason for consult: elev trop        Subjective:     Chief Complaint:  Fall/weakness     HPI:   81 wo woman with alcohol abuse, arthritis, atherosclerosis of the aorta, h/o compression fracture of spine, h/o stroke (1997), hypothyroidism, HLD, essential HTN, osteopenia who presented with her daughter who reports the patient has been experiencing 3 days of progressive weakness and confusion. She normally ambulates independently but she has not been doing so with her walker over the last 3 days. She had a fall and was taken to [facility] where she was treated for a urinary tract infection w/ IV antibiotics and released 3 days ago. No oral antibiotic continued as an outpatient. Patient currently resides in an independent living facility and there is concern that she is unable to take care of herself at home at this time. Report of subjective fever. The history is otherwise limited due to the condition of the patient.      In the emergency department routine laboratory studies and chest x-ray were obtained. There is evidence of ill-defined bilateral lower interstitial opacities concerning for healthcare associated pneumonia. She also had hypokalemia. Her mental status did not improve while in the emergency department. She is also noted to have a slightlyelevated troponin level.     Hospital Course:  She was found to have HCAP likely bacterial with sepsis based on CXR,  leukocytosis, Tmax 100.9, and tachycardia. Empiric abx with vanc and cefepime were started. Blood cultures were collected. She had symptomatic improvement.  She had a slightly elevated troponin. Cardiology was consulted. Echo pending.     On my exam, pt denies cp/sob.  She says she's here because she fell (trip/mechanical fall), no LOC.  She denies cp/sob/fever/chills.  No prior CV hx/MI.  No recent cardiac testing.  Unclear if she has memory impairment as she did not remember Dr. Woodson, whom she has met at least twice since admit.      Past Medical History:   Diagnosis Date    Alcohol abuse     Arthritis     osteoarthritis    Atherosclerosis of aorta     noted on lumbar X-ray 4/1/2013    Back pain     H/O: compression fracture of spine     H/O: stroke 1997    h/o stroke more than 15 yrs ago with no residual    Hyperlipidemia     Hypertension     Hypothyroidism     Leukocytosis     Neutrophilic leukocytosis 4/29/2017    Normal vaginal delivery     x2    Osteopenia     Sepsis due to HCAP pneumonia 4/29/2017    Thyroid disease     hypothyroidism    Urinary incontinence     Vitamin B 12 deficiency     Vitamin D deficiency        Past Surgical History:   Procedure Laterality Date    BREAST BIOPSY      FIXATION KYPHOPLASTY  Sept. 2012    HYSTERECTOMY      JOINT REPLACEMENT      right hip replacement    TOTAL HIP ARTHROPLASTY         Review of patient's allergies indicates:   Allergen Reactions    Lipitor [atorvastatin] Nausea Only       No current facility-administered medications on file prior to encounter.      Current Outpatient Prescriptions on File Prior to Encounter   Medication Sig    alendronate (FOSAMAX) 70 MG tablet TAKE 1 TABLET BY MOUTH EVERY 7 DAYS    levothyroxine (SYNTHROID) 75 MCG tablet TAKE 1 TABLET BY MOUTH EVERY DAY    simvastatin (ZOCOR) 40 MG tablet TAKE 1 TABLET BY MOUTH EVERY EVENING    verapamil (VERELAN) 240 MG C24P TAKE 1 CAPSULE BY MOUTH EVERY DAY    acetaminophen  (TYLENOL) 325 MG tablet Take 2 tablets (650 mg total) by mouth every 8 (eight) hours as needed (Mild pain or T>100.4).    aspirin 81 MG Chew Take 81 mg by mouth once daily.      diazepam (VALIUM) 2 MG tablet Take 1 tab bid for 2 days, then 1 tab daily for 2 days then stop.    hydrocodone-acetaminophen 5-325mg (NORCO) 5-325 mg per tablet Take 1 tablet by mouth every 8 (eight) hours as needed (Severe pain).    polyethylene glycol (GLYCOLAX) 17 gram PwPk Take 17 g by mouth once daily.     Family History     Problem Relation (Age of Onset)    Hypertension Mother, Father    Stroke Mother        Social History Main Topics    Smoking status: Former Smoker     Types: Cigarettes     Quit date: 5/15/1997    Smokeless tobacco: Never Used    Alcohol use 12.6 oz/week     21 Shots of liquor per week      Comment: occasional     Drug use: No    Sexual activity: Not on file     Review of Systems   Constitution: Negative for chills, diaphoresis, fever, weakness and malaise/fatigue.   HENT: Negative for headaches and nosebleeds.    Eyes: Negative for blurred vision and double vision.   Cardiovascular: Negative for chest pain, claudication, cyanosis, dyspnea on exertion, leg swelling, orthopnea, palpitations, paroxysmal nocturnal dyspnea and syncope.   Respiratory: Negative for cough, shortness of breath and wheezing.    Skin: Negative for dry skin and poor wound healing.   Musculoskeletal: Negative for back pain, joint swelling and myalgias.   Gastrointestinal: Negative for abdominal pain, nausea and vomiting.   Genitourinary: Negative for hematuria.   Neurological: Negative for dizziness, numbness and seizures.   Psychiatric/Behavioral: Negative for altered mental status and depression.     Objective:     Vital Signs (Most Recent):  Temp: 98.3 °F (36.8 °C) (04/30/17 0759)  Pulse: 69 (Simultaneous filing. User may not have seen previous data.) (04/30/17 0759)  Resp: 20 (Simultaneous filing. User may not have seen previous  data.) (04/30/17 0759)  BP: 133/60 (04/30/17 0759)  SpO2: 97 % (Simultaneous filing. User may not have seen previous data.) (04/30/17 0759) Vital Signs (24h Range):  Temp:  [97.7 °F (36.5 °C)-99.2 °F (37.3 °C)] 98.3 °F (36.8 °C)  Pulse:  [67-80] 69  Resp:  [16-20] 20  SpO2:  [93 %-100 %] 97 %  BP: (115-144)/(56-71) 133/60     Weight: 59 kg (130 lb)  Body mass index is 21.63 kg/(m^2).    SpO2: 97 % (Simultaneous filing. User may not have seen previous data.)  O2 Device (Oxygen Therapy): room air      Intake/Output Summary (Last 24 hours) at 04/30/17 1005  Last data filed at 04/30/17 0936   Gross per 24 hour   Intake              480 ml   Output                0 ml   Net              480 ml       Lines/Drains/Airways     Pressure Ulcer                 Pressure Ulcer 06/03/16 0739 Right ear, right Stage  days         Pressure Ulcer 06/03/16 0741 Right heel suspected deep tissue injury 331 days          Peripheral Intravenous Line                 Peripheral IV - Single Lumen 04/29/17 Left Antecubital 1 day                Physical Exam   Constitutional: She is oriented to person, place, and time. She appears well-developed and well-nourished. No distress.   HENT:   Head: Normocephalic and atraumatic.   Mouth/Throat: No oropharyngeal exudate.   Eyes: Conjunctivae and EOM are normal. Pupils are equal, round, and reactive to light. No scleral icterus.   Neck: Normal range of motion. Neck supple. No JVD present. No tracheal deviation present.   Cardiovascular: Normal rate, regular rhythm, S1 normal and S2 normal.  Exam reveals no gallop and no friction rub.    Murmur heard.   Early systolic murmur is present with a grade of 1/6   Pulmonary/Chest: Effort normal and breath sounds normal. No respiratory distress. She has no wheezes. She has no rales. She exhibits no tenderness.   Abdominal: Soft. Bowel sounds are normal. There is no tenderness.   Musculoskeletal: Normal range of motion. She exhibits no edema.    Neurological: She is alert and oriented to person, place, and time. No cranial nerve deficit.   ?memory impairment   Skin: Skin is warm and dry. She is not diaphoretic.   Psychiatric: She has a normal mood and affect. Her behavior is normal. Judgment normal.       Current Medications:   albuterol-ipratropium 2.5mg-0.5mg/3mL  3 mL Nebulization Q6H    aspirin  81 mg Oral Daily    ceFEPime (MAXIPIME) IVPB  1 g Intravenous Q24H    dextromethorphan-guaifenesin  mg/5 ml  10 mL Oral Q6H    levothyroxine  75 mcg Oral Daily    simvastatin  40 mg Oral QHS    thiamine  100 mg Oral Daily    vancomycin (VANCOCIN) IVPB  1,000 mg Intravenous Q24H    verapamil  180 mg Oral Daily      sodium chloride 0.9% 75 mL/hr at 04/29/17 2157     acetaminophen, acetaminophen, benzonatate, flu vacc [id number] 65yr up(PF), morphine, morphine, ondansetron    Laboratory:  CBC:    Recent Labs  Lab 04/28/17 1940 04/29/17 0405 04/30/17  0456   WHITE BLOOD CELL COUNT 18.77 H 18.63 H 14.97 H   HEMOGLOBIN 12.8 13.0 11.0 L   HEMATOCRIT 39.0 39.8 34.0 L   PLATELETS 265 195 232       CHEMISTRIES:    Recent Labs  Lab 04/28/17 1940 04/29/17  0405 04/30/17  0456   GLUCOSE 109 99 83   SODIUM 138 137 137   POTASSIUM 3.3 L 4.1 3.5   BUN BLD 11 12 7 L   CREATININE 0.8 0.8 0.7   EGFR IF  >60 >60 >60   EGFR IF NON- >60 >60 >60   CALCIUM 9.0 8.3 L 7.9 L   MAGNESIUM  --   --  1.5 L       CARDIAC BIOMARKERS:    Recent Labs  Lab 06/01/16 1830 04/28/17 1940 04/29/17  0045   CPK 51  --   --    TROPONIN I  --  0.044 H 0.047 H       COAGS:    Recent Labs  Lab 06/01/16 1830 04/28/17 1940   INR 1.0 1.0       LIPIDS/LFTS:    Recent Labs  Lab 09/30/15  1036 06/01/16 1830 04/28/17 1940   CHOLESTEROL 176  --   --    TRIGLYCERIDES 79  --   --    HDL 79 H  --   --    LDL CHOLESTEROL 81.2  --   --    NON-HDL CHOLESTEROL 97  --   --    AST 23 21 17   ALT 12 11 7 L           Diagnostic Results:  ECG (personally reviewed  tracings):   4/28/17 1917 , LAD/LVH, PRWP, TWI antlat/inf ?isch.  TWI new vs 6/2/16    Chest X-Ray (personally reviewed image(s)): 4/28/17  Increased ill-defined bilateral lower lung zone interstitial opacities, suspicious for edema or infectious/inflammatory disease.    Echo: pending      Assessment and Plan:     * Bacterial pneumonia  Per IM  On abx  Culture data pending    Elevated troponin  Asymptomatic  Ischemic EKG changes noted vs prior tracing  Cannot exclude demand  Check echo  Will plan eventual MPI (may be as outpatient)    Murmur, cardiac  ?Aortic sclerosis  Check echo    Hypertension, benign  Controlled    Hyperlipidemia  On statin rx  Repeat lipid panel    Mild cognitive impairment  As above      VTE Risk Mitigation         Ordered     Medium Risk of VTE  Once      04/29/17 0141     Place EVERETTE hose  Until discontinued      04/29/17 0141     Place sequential compression device  Until discontinued      04/29/17 0141          Thank you for your consult. I will follow-up with patient. Please contact us if you have any additional questions.    Robert Ramos MD  Cardiology   [facility]    Addendum 4/30/17 2pm:    Echo 4/30/17:    1 - Normal left ventricular systolic function (EF 55-60%).     2 - No wall motion abnormalities.     3 - Concentric remodeling.     4 - Impaired LV relaxation, elevated LAP (grade 2 diastolic dysfunction).     5 - Mild aortic stenosis, PETE = 1.48 cm2, peak velocity = 2.31 m/s, mean gradient = 11.69 mmHg.     6 - Trivial mitral regurgitation.     7 - Trivial tricuspid regurgitation.     8 - Pulmonary hypertension. The estimated PA systolic pressure is 50 mmHg.     9 - Atrial septal aneurysm .     10 - Possible 1.5x1.1 cm mass noted in RA/IVC.  Consider CHINTAN for further evaluation.     Will make pt NPO for possible CHINTAN in am 5/1/17

## 2017-04-30 NOTE — PLAN OF CARE
Problem: Physical Therapy Goal  Goal: Physical Therapy Goal  Goals to be met by: 17     Patient will increase functional independence with mobility by performin. Supine to sit with Supervision  2. Rolling to Left and Right with Supervision  3. Sit to stand transfer with Supervision using RW  4. Bed to chair transfer with Supervision using Rolling Walker  5. Gait x 150 feet with Supervision using Rolling Walker   6. Lower extremity exercise program x 20 reps per handout, with supervision   Outcome: Ongoing (interventions implemented as appropriate)  Pt with improved gait distance today.

## 2017-04-30 NOTE — PLAN OF CARE
04/29/17 1614   Discharge Assessment   Assessment Type Discharge Planning Assessment   Patient/Family In Agreement With Plan other (see comments)  (Attempted d/c assessment.)

## 2017-05-01 ENCOUNTER — SURGERY (OUTPATIENT)
Age: 82
End: 2017-05-01

## 2017-05-01 ENCOUNTER — ANESTHESIA (OUTPATIENT)
Dept: CARDIOLOGY | Facility: HOSPITAL | Age: 82
DRG: 871 | End: 2017-05-01
Payer: MEDICARE

## 2017-05-01 ENCOUNTER — ANESTHESIA EVENT (OUTPATIENT)
Dept: CARDIOLOGY | Facility: HOSPITAL | Age: 82
DRG: 871 | End: 2017-05-01
Payer: MEDICARE

## 2017-05-01 PROBLEM — R79.89 ELEVATED TROPONIN: Status: RESOLVED | Noted: 2017-04-29 | Resolved: 2017-05-01

## 2017-05-01 PROBLEM — R93.1 ABNORMAL ECHOCARDIOGRAM FINDINGS WITHOUT DIAGNOSIS: Status: ACTIVE | Noted: 2017-05-01

## 2017-05-01 PROBLEM — I35.0 NONRHEUMATIC AORTIC VALVE STENOSIS: Status: ACTIVE | Noted: 2017-05-01

## 2017-05-01 PROBLEM — I35.0 NONRHEUMATIC AORTIC VALVE STENOSIS: Status: ACTIVE | Noted: 2017-04-30

## 2017-05-01 LAB
ANION GAP SERPL CALC-SCNC: 10 MMOL/L
AORTIC ATHEROMA: YES
AORTIC VALVE REGURGITATION: ABNORMAL
AORTIC VALVE STENOSIS: ABNORMAL
BASOPHILS # BLD AUTO: 0.02 K/UL
BASOPHILS NFR BLD: 0.1 %
BUN SERPL-MCNC: 4 MG/DL
C DIFF GDH STL QL: POSITIVE
C DIFF TOX A+B STL QL IA: NEGATIVE
CALCIUM SERPL-MCNC: 8.4 MG/DL
CHLORIDE SERPL-SCNC: 108 MMOL/L
CO2 SERPL-SCNC: 22 MMOL/L
CREAT SERPL-MCNC: 0.6 MG/DL
DIASTOLIC DYSFUNCTION: NO
DIFFERENTIAL METHOD: ABNORMAL
EOSINOPHIL # BLD AUTO: 0.1 K/UL
EOSINOPHIL NFR BLD: 1 %
ERYTHROCYTE [DISTWIDTH] IN BLOOD BY AUTOMATED COUNT: 16.3 %
EST. GFR  (AFRICAN AMERICAN): >60 ML/MIN/1.73 M^2
EST. GFR  (NON AFRICAN AMERICAN): >60 ML/MIN/1.73 M^2
GLUCOSE SERPL-MCNC: 72 MG/DL
HCT VFR BLD AUTO: 38.9 %
HGB BLD-MCNC: 12.8 G/DL
LYMPHOCYTES # BLD AUTO: 2.7 K/UL
LYMPHOCYTES NFR BLD: 19.7 %
MCH RBC QN AUTO: 30 PG
MCHC RBC AUTO-ENTMCNC: 32.9 %
MCV RBC AUTO: 91 FL
MITRAL VALVE MOBILITY: NORMAL
MITRAL VALVE REGURGITATION: ABNORMAL
MONOCYTES # BLD AUTO: 1.3 K/UL
MONOCYTES NFR BLD: 9.3 %
NEUTROPHILS # BLD AUTO: 9.5 K/UL
NEUTROPHILS NFR BLD: 69.9 %
PLATELET # BLD AUTO: 293 K/UL
PMV BLD AUTO: 12.4 FL
POTASSIUM SERPL-SCNC: 3.2 MMOL/L
RBC # BLD AUTO: 4.27 M/UL
RETIRED EF AND QEF - SEE NOTES: 65 (ref 55–65)
SODIUM SERPL-SCNC: 140 MMOL/L
TRICUSPID VALVE REGURGITATION: ABNORMAL
WBC # BLD AUTO: 13.62 K/UL

## 2017-05-01 PROCEDURE — 12000002 HC ACUTE/MED SURGE SEMI-PRIVATE ROOM

## 2017-05-01 PROCEDURE — 25000003 PHARM REV CODE 250: Performed by: INTERNAL MEDICINE

## 2017-05-01 PROCEDURE — 36415 COLL VENOUS BLD VENIPUNCTURE: CPT

## 2017-05-01 PROCEDURE — 80048 BASIC METABOLIC PNL TOTAL CA: CPT

## 2017-05-01 PROCEDURE — 94799 UNLISTED PULMONARY SVC/PX: CPT

## 2017-05-01 PROCEDURE — 37000008 HC ANESTHESIA 1ST 15 MINUTES: Performed by: INTERNAL MEDICINE

## 2017-05-01 PROCEDURE — 37000009 HC ANESTHESIA EA ADD 15 MINS: Performed by: INTERNAL MEDICINE

## 2017-05-01 PROCEDURE — 94761 N-INVAS EAR/PLS OXIMETRY MLT: CPT

## 2017-05-01 PROCEDURE — D9220A PRA ANESTHESIA: Mod: CRNA,,, | Performed by: NURSE ANESTHETIST, CERTIFIED REGISTERED

## 2017-05-01 PROCEDURE — 97530 THERAPEUTIC ACTIVITIES: CPT

## 2017-05-01 PROCEDURE — 85025 COMPLETE CBC W/AUTO DIFF WBC: CPT

## 2017-05-01 PROCEDURE — 93320 DOPPLER ECHO COMPLETE: CPT

## 2017-05-01 PROCEDURE — 63600175 PHARM REV CODE 636 W HCPCS: Performed by: INTERNAL MEDICINE

## 2017-05-01 PROCEDURE — 93312 ECHO TRANSESOPHAGEAL: CPT | Mod: 26,,, | Performed by: INTERNAL MEDICINE

## 2017-05-01 PROCEDURE — D9220A PRA ANESTHESIA: Mod: ANES,,, | Performed by: ANESTHESIOLOGY

## 2017-05-01 PROCEDURE — 94640 AIRWAY INHALATION TREATMENT: CPT

## 2017-05-01 PROCEDURE — 97110 THERAPEUTIC EXERCISES: CPT

## 2017-05-01 PROCEDURE — 63600175 PHARM REV CODE 636 W HCPCS: Performed by: NURSE ANESTHETIST, CERTIFIED REGISTERED

## 2017-05-01 PROCEDURE — 25000242 PHARM REV CODE 250 ALT 637 W/ HCPCS: Performed by: HOSPITALIST

## 2017-05-01 PROCEDURE — 25000003 PHARM REV CODE 250: Performed by: NURSE ANESTHETIST, CERTIFIED REGISTERED

## 2017-05-01 PROCEDURE — 25000003 PHARM REV CODE 250: Performed by: HOSPITALIST

## 2017-05-01 PROCEDURE — 27000221 HC OXYGEN, UP TO 24 HOURS

## 2017-05-01 PROCEDURE — 25000003 PHARM REV CODE 250: Performed by: EMERGENCY MEDICINE

## 2017-05-01 PROCEDURE — 99233 SBSQ HOSP IP/OBS HIGH 50: CPT | Mod: ,,, | Performed by: INTERNAL MEDICINE

## 2017-05-01 PROCEDURE — 93320 DOPPLER ECHO COMPLETE: CPT | Mod: 26,,, | Performed by: INTERNAL MEDICINE

## 2017-05-01 RX ORDER — PROPOFOL 10 MG/ML
VIAL (ML) INTRAVENOUS
Status: DISCONTINUED | OUTPATIENT
Start: 2017-05-01 | End: 2017-05-01

## 2017-05-01 RX ORDER — LORAZEPAM 2 MG/ML
1 INJECTION INTRAMUSCULAR EVERY 4 HOURS PRN
Status: DISCONTINUED | OUTPATIENT
Start: 2017-05-01 | End: 2017-05-02

## 2017-05-01 RX ORDER — LANOLIN ALCOHOL/MO/W.PET/CERES
400 CREAM (GRAM) TOPICAL DAILY
Status: COMPLETED | OUTPATIENT
Start: 2017-05-01 | End: 2017-05-01

## 2017-05-01 RX ORDER — LIDOCAINE HCL/PF 100 MG/5ML
SYRINGE (ML) INTRAVENOUS
Status: DISCONTINUED | OUTPATIENT
Start: 2017-05-01 | End: 2017-05-01

## 2017-05-01 RX ORDER — SODIUM CHLORIDE, SODIUM LACTATE, POTASSIUM CHLORIDE, CALCIUM CHLORIDE 600; 310; 30; 20 MG/100ML; MG/100ML; MG/100ML; MG/100ML
INJECTION, SOLUTION INTRAVENOUS CONTINUOUS PRN
Status: DISCONTINUED | OUTPATIENT
Start: 2017-05-01 | End: 2017-05-01

## 2017-05-01 RX ORDER — POTASSIUM CHLORIDE 20 MEQ/1
40 TABLET, EXTENDED RELEASE ORAL EVERY 4 HOURS
Status: COMPLETED | OUTPATIENT
Start: 2017-05-01 | End: 2017-05-01

## 2017-05-01 RX ORDER — LANOLIN ALCOHOL/MO/W.PET/CERES
400 CREAM (GRAM) TOPICAL DAILY
Status: DISCONTINUED | OUTPATIENT
Start: 2017-05-01 | End: 2017-05-01

## 2017-05-01 RX ADMIN — IPRATROPIUM BROMIDE AND ALBUTEROL SULFATE 3 ML: .5; 3 SOLUTION RESPIRATORY (INHALATION) at 01:05

## 2017-05-01 RX ADMIN — VERAPAMIL HYDROCHLORIDE 180 MG: 180 TABLET, FILM COATED, EXTENDED RELEASE ORAL at 08:05

## 2017-05-01 RX ADMIN — SODIUM CHLORIDE, SODIUM LACTATE, POTASSIUM CHLORIDE, AND CALCIUM CHLORIDE: .6; .31; .03; .02 INJECTION, SOLUTION INTRAVENOUS at 11:05

## 2017-05-01 RX ADMIN — POTASSIUM CHLORIDE 40 MEQ: 1500 TABLET, EXTENDED RELEASE ORAL at 03:05

## 2017-05-01 RX ADMIN — MAGNESIUM OXIDE TAB 400 MG (241.3 MG ELEMENTAL MG) 400 MG: 400 (241.3 MG) TAB at 08:05

## 2017-05-01 RX ADMIN — ASPIRIN 81 MG CHEWABLE TABLET 81 MG: 81 TABLET CHEWABLE at 08:05

## 2017-05-01 RX ADMIN — LEVOTHYROXINE SODIUM 75 MCG: 75 TABLET ORAL at 08:05

## 2017-05-01 RX ADMIN — IPRATROPIUM BROMIDE AND ALBUTEROL SULFATE 3 ML: .5; 3 SOLUTION RESPIRATORY (INHALATION) at 11:05

## 2017-05-01 RX ADMIN — POTASSIUM CHLORIDE 40 MEQ: 1500 TABLET, EXTENDED RELEASE ORAL at 10:05

## 2017-05-01 RX ADMIN — PROPOFOL 80 MG: 10 INJECTION, EMULSION INTRAVENOUS at 11:05

## 2017-05-01 RX ADMIN — SIMVASTATIN 40 MG: 40 TABLET, FILM COATED ORAL at 09:05

## 2017-05-01 RX ADMIN — LIDOCAINE HYDROCHLORIDE 60 MG: 20 INJECTION, SOLUTION INTRAVENOUS at 11:05

## 2017-05-01 RX ADMIN — THIAMINE HCL TAB 100 MG 100 MG: 100 TAB at 08:05

## 2017-05-01 RX ADMIN — MOXIFLOXACIN HYDROCHLORIDE 400 MG: 400 INJECTION, SOLUTION INTRAVENOUS at 12:05

## 2017-05-01 RX ADMIN — IPRATROPIUM BROMIDE AND ALBUTEROL SULFATE 3 ML: .5; 3 SOLUTION RESPIRATORY (INHALATION) at 08:05

## 2017-05-01 RX ADMIN — SODIUM CHLORIDE: 0.9 INJECTION, SOLUTION INTRAVENOUS at 11:05

## 2017-05-01 NOTE — PLAN OF CARE
Problem: Physical Therapy Goal  Goal: Physical Therapy Goal  Goals to be met by: 17     Patient will increase functional independence with mobility by performin. Supine to sit with Supervision  2. Rolling to Left and Right with Supervision  3. Sit to stand transfer with Supervision using RW  4. Bed to chair transfer with Supervision using Rolling Walker  5. Gait x 150 feet with Supervision using Rolling Walker   6. Lower extremity exercise program x 20 reps per handout, with supervision   Outcome: Ongoing (interventions implemented as appropriate)  Continue with POC.

## 2017-05-01 NOTE — ANESTHESIA POSTPROCEDURE EVALUATION
"Anesthesia Post Evaluation    Patient: Sharona Acosta    Procedure(s) Performed: Procedure(s) (LRB):  TRANSESOPHAGEAL ECHOCARDIOGRAM (CHINTAN) (N/A)    Final Anesthesia Type: general  Patient location during evaluation: PACU  Patient participation: Yes- Able to Participate  Level of consciousness: awake  Post-procedure vital signs: reviewed and stable  Pain management: adequate  Airway patency: patent  PONV status at discharge: No PONV  Anesthetic complications: no      Cardiovascular status: stable  Respiratory status: unassisted  Hydration status: euvolemic  Follow-up not needed.        Visit Vitals    BP (!) 143/60 (BP Location: Right arm, BP Method: Automatic)    Pulse 85    Temp 36.5 °C (97.7 °F) (Oral)    Resp 17    Ht 5' 5" (1.651 m)    Wt 59 kg (130 lb)    SpO2 100%    Breastfeeding No    BMI 21.63 kg/m2       Pain/Kyleigh Score: Pain Assessment Performed: Yes (5/1/2017 10:00 AM)  Presence of Pain: denies (5/1/2017 10:00 AM)  Pain Rating Prior to Med Admin: 0 (5/1/2017  7:57 AM)  Pain Rating Post Med Admin: 0 (5/1/2017  9:16 AM)      "

## 2017-05-01 NOTE — OP NOTE
[facility]  Brief Operative Note     SUMMARY     Surgery Date: 5/1/2017     Surgeon(s) and Role:     * Robert Ramos MD - Primary    Assisting Surgeon: None    Anesthesia: CRNA staff, propofol    Pre-op Diagnosis:  Abnormal echocardiogram [R93.1]  Sepsis, due to unspecified organism [A41.9]    Post-op Diagnosis:  Post-Op Diagnosis Codes:     * Abnormal echocardiogram [R93.1]     * Sepsis, due to unspecified organism [A41.9]    Procedure(s) (LRB):  TRANSESOPHAGEAL ECHOCARDIOGRAM (CHINTAN) (N/A)    Anesthesia: Monitor Anesthesia Care    Description of the findings of the procedure:   LVEF 65%, no WMA  Mild-mod AS, PETE 1.3 cm2 by planimetry  Mild MR/TR  No valvular vegetations  No LA/AKBAR thrombus  No evidence of mass within RA/IVC/SVC  ?abnormality on 2d echo within esophagus or prominent Chiari network    Pt tolerated the procedure well without complications     Estimated Blood Loss: none         Specimens: None

## 2017-05-01 NOTE — PROGRESS NOTES
[facility]  Cardiology  Progress Note    Patient Name: Sharona Acosta  MRN: [id number]  Admission Date: 4/28/2017  Hospital Length of Stay: 3 days  Code Status: Full Code   Attending Physician: Rina Woodson MD   Primary Care Physician: Vianey Lee MD  Expected Discharge Date:   Principal Problem:Bacterial pneumonia    Subjective:     Hospital Course:   No events.  No cp/sob.  Results of echo (?mass in RA/IVC and AS) and case d/w RN and daughter: Nona Roberto [phone].  Plan for CHINTAN today.    Tele: NSR, PVCs      Past Medical History:   Diagnosis Date    Alcohol abuse     Arthritis     osteoarthritis    Atherosclerosis of aorta     noted on lumbar X-ray 4/1/2013    Back pain     H/O: compression fracture of spine     H/O: stroke 1997    h/o stroke more than 15 yrs ago with no residual    Hyperlipidemia     Hypertension     Hypothyroidism     Leukocytosis     Neutrophilic leukocytosis 4/29/2017    Normal vaginal delivery     x2    Osteopenia     Sepsis due to HCAP pneumonia 4/29/2017    Thyroid disease     hypothyroidism    Urinary incontinence     Vitamin B 12 deficiency     Vitamin D deficiency        Past Surgical History:   Procedure Laterality Date    BREAST BIOPSY      FIXATION KYPHOPLASTY  Sept. 2012    HYSTERECTOMY      JOINT REPLACEMENT      right hip replacement    TOTAL HIP ARTHROPLASTY         Review of patient's allergies indicates:   Allergen Reactions    Lipitor [atorvastatin] Nausea Only       No current facility-administered medications on file prior to encounter.      Current Outpatient Prescriptions on File Prior to Encounter   Medication Sig    alendronate (FOSAMAX) 70 MG tablet TAKE 1 TABLET BY MOUTH EVERY 7 DAYS    levothyroxine (SYNTHROID) 75 MCG tablet TAKE 1 TABLET BY MOUTH EVERY DAY    simvastatin (ZOCOR) 40 MG tablet TAKE 1 TABLET BY MOUTH EVERY EVENING    verapamil (VERELAN) 240 MG C24P TAKE 1 CAPSULE BY MOUTH EVERY DAY     acetaminophen (TYLENOL) 325 MG tablet Take 2 tablets (650 mg total) by mouth every 8 (eight) hours as needed (Mild pain or T>100.4).    aspirin 81 MG Chew Take 81 mg by mouth once daily.      diazepam (VALIUM) 2 MG tablet Take 1 tab bid for 2 days, then 1 tab daily for 2 days then stop.    hydrocodone-acetaminophen 5-325mg (NORCO) 5-325 mg per tablet Take 1 tablet by mouth every 8 (eight) hours as needed (Severe pain).    polyethylene glycol (GLYCOLAX) 17 gram PwPk Take 17 g by mouth once daily.     Family History     Problem Relation (Age of Onset)    Hypertension Mother, Father    Stroke Mother        Social History Main Topics    Smoking status: Former Smoker     Types: Cigarettes     Quit date: 5/15/1997    Smokeless tobacco: Never Used    Alcohol use 12.6 oz/week     21 Shots of liquor per week      Comment: occasional     Drug use: No    Sexual activity: Not on file     Review of Systems   Gastrointestinal: Negative for melena.   Genitourinary: Negative for hematuria.     Objective:     Vital Signs (Most Recent):  Temp: 97.7 °F (36.5 °C) (05/01/17 0334)  Pulse: 84 (05/01/17 0334)  Resp: 20 (05/01/17 0334)  BP: 127/69 (05/01/17 0334)  SpO2: (!) 92 % (05/01/17 0334) Vital Signs (24h Range):  Temp:  [97.7 °F (36.5 °C)-98.3 °F (36.8 °C)] 97.7 °F (36.5 °C)  Pulse:  [69-87] 84  Resp:  [18-20] 20  SpO2:  [91 %-97 %] 92 %  BP: (126-159)/(51-69) 127/69     Weight: 59 kg (130 lb)  Body mass index is 21.63 kg/(m^2).    SpO2: (!) 92 %  O2 Device (Oxygen Therapy): room air      Intake/Output Summary (Last 24 hours) at 05/01/17 0635  Last data filed at 04/30/17 1800   Gross per 24 hour   Intake             3480 ml   Output                0 ml   Net             3480 ml       Lines/Drains/Airways     Pressure Ulcer                 Pressure Ulcer 06/03/16 0739 Right ear, right Stage  days         Pressure Ulcer 06/03/16 0741 Right heel suspected deep tissue injury 331 days          Peripheral Intravenous Line                  Peripheral IV - Single Lumen 04/30/17 1041 Left Antecubital less than 1 day                Physical Exam   Constitutional: She is oriented to person, place, and time. She appears well-developed and well-nourished. No distress.   HENT:   Head: Normocephalic and atraumatic.   Mouth/Throat: No oropharyngeal exudate.   Eyes: Conjunctivae and EOM are normal. Pupils are equal, round, and reactive to light. No scleral icterus.   Neck: Normal range of motion. Neck supple. No JVD present. No tracheal deviation present.   Cardiovascular: Normal rate, regular rhythm, S1 normal and S2 normal.  Exam reveals no gallop and no friction rub.    Murmur heard.   Early systolic murmur is present with a grade of 1/6   Pulmonary/Chest: Effort normal and breath sounds normal. No respiratory distress. She has no wheezes. She has no rales. She exhibits no tenderness.   Abdominal: Soft. Bowel sounds are normal. There is no tenderness.   Musculoskeletal: Normal range of motion. She exhibits no edema.   Neurological: She is alert and oriented to person, place, and time. No cranial nerve deficit.   ?memory impairment   Skin: Skin is warm and dry. She is not diaphoretic.   Psychiatric: She has a normal mood and affect. Her behavior is normal. Judgment normal.       Current Medications:   albuterol-ipratropium 2.5mg-0.5mg/3mL  3 mL Nebulization Q6H    aspirin  81 mg Oral Daily    dextromethorphan-guaifenesin  mg/5 ml  10 mL Oral Q6H    levothyroxine  75 mcg Oral Daily    moxifloxacin  400 mg Intravenous Q24H    simvastatin  40 mg Oral QHS    thiamine  100 mg Oral Daily    verapamil  180 mg Oral Daily      sodium chloride 0.9% 75 mL/hr at 04/30/17 2146     acetaminophen, acetaminophen, benzonatate, flu vacc [id number] 65yr up(PF), morphine, morphine, ondansetron    Laboratory:  CBC:    Recent Labs  Lab 04/29/17  0405 04/30/17  0456 05/01/17  0419   WHITE BLOOD CELL COUNT 18.63 H 14.97 H 13.62 H   HEMOGLOBIN 13.0 11.0 L  12.8   HEMATOCRIT 39.8 34.0 L 38.9   PLATELETS 195 232 293       CHEMISTRIES:    Recent Labs  Lab 04/28/17  1940 04/29/17  0405 04/30/17  0456   GLUCOSE 109 99 83   SODIUM 138 137 137   POTASSIUM 3.3 L 4.1 3.5   BUN BLD 11 12 7 L   CREATININE 0.8 0.8 0.7   EGFR IF  >60 >60 >60   EGFR IF NON- >60 >60 >60   CALCIUM 9.0 8.3 L 7.9 L   MAGNESIUM  --   --  1.5 L       CARDIAC BIOMARKERS:    Recent Labs  Lab 06/01/16 1830 04/28/17 1940 04/29/17  0045   CPK 51  --   --    TROPONIN I  --  0.044 H 0.047 H       COAGS:    Recent Labs  Lab 06/01/16 1830 04/28/17 1940   INR 1.0 1.0       LIPIDS/LFTS:    Recent Labs  Lab 09/30/15  1036 06/01/16 1830 04/28/17 1940 04/30/17  0456   CHOLESTEROL 176  --   --  101 L   TRIGLYCERIDES 79  --   --  60   HDL 79 H  --   --  42   LDL CHOLESTEROL 81.2  --   --  47.0 L   NON-HDL CHOLESTEROL 97  --   --  59   AST 23 21 17  --    ALT 12 11 7 L  --            Diagnostic Results:  ECG (personally reviewed tracings):   4/28/17 1917 , LAD/LVH, PRWP, TWI antlat/inf ?isch.  TWI new vs 6/2/16    Chest X-Ray (personally reviewed image(s)): 4/28/17  Increased ill-defined bilateral lower lung zone interstitial opacities, suspicious for edema or infectious/inflammatory disease.    Echo: 4/30/17 (images pers rev)    1 - Normal left ventricular systolic function (EF 55-60%).     2 - No wall motion abnormalities.     3 - Concentric remodeling.     4 - Impaired LV relaxation, elevated LAP (grade 2 diastolic dysfunction).     5 - Mild aortic stenosis, PETE = 1.48 cm2, peak velocity = 2.31 m/s, mean gradient = 11.69 mmHg.     6 - Trivial mitral regurgitation.     7 - Trivial tricuspid regurgitation.     8 - Pulmonary hypertension. The estimated PA systolic pressure is 50 mmHg.     9 - Atrial septal aneurysm .     10 - Possible 1.5x1.1 cm mass noted in RA/IVC.  Consider CHINTAN for further evaluation.       Assessment and Plan:     * Bacterial pneumonia  Per IM  On  abx  Culture data pending    Elevated troponin  Asymptomatic  Ischemic EKG changes noted vs prior tracing  Echo 4/30/17 with normal LV fxn and nor WMA  Cannot exclude demand  Will plan eventual MPI (may be as outpatient)    Abnormal echocardiogram findings without diagnosis  ?mass in RA/IVC on echo 4/30/17  CHINTAN planned today  Risks, benefits and alternatives of the CHINTAN procedure were discussed with the patient (and her daughter Nona Roberto [phone]), including throat irritation, aspiration, anesthetic complications, esophageal irritation/perforation, etc.  Patient understands and agrees to proceed.  Consent was placed on the chart.      Nonrheumatic aortic valve stenosis  Mild AS on Echo 4/30/17  Med rx planned given absence of sxs    Hypertension, benign  Controlled    Hyperlipidemia  On statin rx  LDL acceptable    Mild cognitive impairment  As above      VTE Risk Mitigation         Ordered     Medium Risk of VTE  Once      04/29/17 0141     Place EVERETTE hose  Until discontinued      04/29/17 0141     Place sequential compression device  Until discontinued      04/29/17 0141          Robert Ramos MD  Cardiology  [facility]

## 2017-05-01 NOTE — ASSESSMENT & PLAN NOTE
Likely cardiac strain associated with increased metabolic demand with acute infection and sepsis. Echo as above. Appreciate cardiology recs.

## 2017-05-01 NOTE — ANESTHESIA PREPROCEDURE EVALUATION
05/01/2017  Sharona Acosta is a 81 y.o., female.    Anesthesia Evaluation    I have reviewed the Patient Summary Reports.     I have reviewed the Medications.     Review of Systems  Anesthesia Hx:  No problems with previous Anesthesia    Social:  Alcohol Use, Former Smoker    Cardiovascular:   Denies Pacemaker. Hypertension Valvular problems/Murmurs, AS  Denies MI.  Denies CAD.    Denies CABG/stent.  Denies Dysrhythmias.   Denies Angina.         denies PVD hyperlipidemia Echo 4/2017  EF 55%  Mild aortic stenosis  + diastolic dysfcn  + pulm HTN   Pulmonary:   Pneumonia Denies COPD.  Denies Asthma.  Denies Shortness of breath.  Denies Recent URI.  Denies Sleep Apnea.    Renal/:  Renal/ Normal     Hepatic/GI:  Hepatic/GI Normal    Musculoskeletal:   Arthritis  Compression fx Spine Disorders:    Neurological:   CVA Denies Seizures.  Dementia    Endocrine:   Hypothyroidism        Physical Exam  General:  Well nourished    Airway/Jaw/Neck:  Airway Findings: Mouth Opening: Normal Tongue: Normal  General Airway Assessment: Adult  Mallampati: II  TM Distance: 4 - 6 cm      Dental:  Dental Findings: In tact   Chest/Lungs:  Chest/Lungs Findings: Normal Respiratory Rate     Heart/Vascular:  Heart Findings: Rate: Normal             Anesthesia Plan  Type of Anesthesia, risks & benefits discussed:  Anesthesia Type:  general  Patient's Preference:   Intra-op Monitoring Plan: standard ASA monitors  Intra-op Monitoring Plan Comments:   Post Op Pain Control Plan:   Post Op Pain Control Plan Comments:   Induction:   IV  Beta Blocker:  Patient is not currently on a Beta-Blocker (No further documentation required).       Informed Consent: Patient representative understands risks and agrees with Anesthesia plan.  Questions answered. Anesthesia consent signed with patient representative.  ASA Score: 3     Day of Surgery Review  of History & Physical:    H&P update referred to the surgeon.     Anesthesia Plan Notes: Pt w/ sepsis, mild cognitive impairment and hx mild aortic stenosis for CHINTAN        Ready For Surgery From Anesthesia Perspective.

## 2017-05-01 NOTE — PLAN OF CARE
Problem: Pressure Ulcer Risk (Roney Scale) (Adult,Obstetrics,Pediatric)  Goal: Skin Integrity  Patient will demonstrate the desired outcomes by discharge/transition of care.   Outcome: Ongoing (interventions implemented as appropriate)    05/01/17 1524   Pressure Ulcer Risk (Roney Scale) (Adult,Obstetrics,Pediatric)   Skin Integrity making progress toward outcome         Problem: Patient Care Overview  Goal: Plan of Care Review  Outcome: Ongoing (interventions implemented as appropriate)    05/01/17 1524   Coping/Psychosocial   Plan Of Care Reviewed With patient   Plan of care discussed with patient .However, reinforcement needed because confusion present.Skin intact. No wounds or abrasions noted. Remains on avelox per iv. Pressure ulcer prevention continues. Turned every 2 hours. Encouraged to eat however, refused lunch. Plan to discharge to SNF    Problem: Infection, Risk/Actual (Adult)  Goal: Infection Prevention/Resolution  Patient will demonstrate the desired outcomes by discharge/transition of care.   Outcome: Ongoing (interventions implemented as appropriate)    05/01/17 1524   Infection, Risk/Actual (Adult)   Infection Prevention/Resolution making progress toward outcome

## 2017-05-01 NOTE — NURSING
Report received and patient care assumed.See flow sheet for complete assessment. Telemetry box  And same number # [id number] with waveform noted checked with fritz contreras RN. Plan of care discussed with patient reinforcement needed.Oriented to self only. She also knows is in hosp but not [facility].Speak of family like their are at facility

## 2017-05-01 NOTE — PLAN OF CARE
Problem: Patient Care Overview  Goal: Plan of Care Review  Outcome: Ongoing (interventions implemented as appropriate)  Oriented to self and occasionally place. Telemetry monitoring maintained. IV antibiotics and fluids administered as ordered. NPO after midnight for possible CHINTAN in AM. Respiratory treatments q6h. Turns in bed with encouragement. Bed alarm armed and audible. VSSAF. Special contact precautions continued pending c.diff result. Remains free from falls or further trauma.

## 2017-05-01 NOTE — NURSING
Received from Cath lab.No acute distress noted.Vital signs stable. Drowsy but arousable to verbal stimuli

## 2017-05-01 NOTE — PT/OT/SLP PROGRESS
Occupational Therapy  Treatment    Sharona Acosta   MRN: [id number]   Admitting Diagnosis: Bacterial pneumonia    OT Date of Treatment: 17   OT Start Time: 1142  OT Stop Time: 1155  OT Total Time (min): 13 min    Billable Minutes:  Therapeutic Exercise 13    General Precautions: Standard, contact, fall (recent adm to  secondary to fall)  Orthopedic Precautions: N/A  Braces: N/A         Subjective:  Communicated with Cait prior to session.    Pain Ratin/10              Pain Rating Post-Intervention: 0/10    Objective:  Patient found with: telemetry, peripheral IV     Functional Mobility:  Bed Mobility:       Transfers:        Functional Ambulation: not during this session    Activities of Daily Living:  Feeding Level of Assistance: Set-up Assistance      Therapeutic Activities and Exercises:  3 sets of 10    AM-PAC 6 CLICK ADL   How much help from another person does this patient currently need?   1 = Unable, Total/Dependent Assistance  2 = A lot, Maximum/Moderate Assistance  3 = A little, Minimum/Contact Guard/Supervision  4 = None, Modified Clarke/Independent    Putting on and taking off regular lower body clothing? : 1  Bathing (including washing, rinsing, drying)?: 1  Toileting, which includes using toilet, bedpan, or urinal? : 1  Putting on and taking off regular upper body clothing?: 2  Taking care of personal grooming such as brushing teeth?: 3  Eating meals?: 3  Total Score: 11     AM-PAC Raw Score CMS G-Code Modifier Level of Impairment Assistance   6 % Total / Unable   7 - 9 CM 80 - 100% Maximal Assist   10 - 14 CL 60 - 80% Moderate Assist   15 - 19 CK 40 - 60% Moderate Assist   20 - 22 CJ 20 - 40% Minimal Assist   23 CI 1-20% SBA / CGA   24 CH 0% Independent/ Mod I     Patient left supine with all lines intact and call button in reach    ASSESSMENT:  Sharona Acosta is a 81 y.o. female with a medical diagnosis of Bacterial pneumonia and presents with supervision needed in the home  environment. Pt is oriented to self and place. Pt does have short term memory deficits. OT to continue with treatment to address self care and genera mobility.    Rehab identified problem list/impairments: Rehab identified problem list/impairments: weakness, impaired endurance, impaired self care skills, impaired functional mobilty, gait instability, impaired cognition, impaired balance, decreased safety awareness    Rehab potential is good.    Activity tolerance: Good    Discharge recommendations: Discharge Facility/Level Of Care Needs: home health OT     Barriers to discharge: Barriers to Discharge: Other (Comment) (Pt is not oriented)    Equipment recommendations: bedside commode     GOALS:   Occupational Therapy Goals        Problem: Occupational Therapy Goal    Goal Priority Disciplines Outcome Interventions   Occupational Therapy Goal     OT, PT/OT Ongoing (interventions implemented as appropriate)    Description:  Goals to be met by: 5.10.17     Patient will increase functional independence with ADLs by performing:    Feeding with Set-up Assistance.  UE Dressing with Minimal Assistance.  Grooming while seated with Contact Guard Assistance.  Rolling to Right with Set-up Assistance.   Upper extremity exercise program x10 reps per handout, with assistance as needed.                Plan:  Patient to be seen 5 x/week to address the above listed problems via self-care/home management, therapeutic activities, therapeutic exercises  Plan of Care expires: 05/12/17  Plan of Care reviewed with: patient         Susan Garg OT  05/01/2017

## 2017-05-01 NOTE — SUBJECTIVE & OBJECTIVE
Review of Systems   Unable to perform ROS: Dementia     Objective:     Vital Signs (Most Recent):  Temp: 97.4 °F (36.3 °C) (05/01/17 1230)  Pulse: 86 (05/01/17 1230)  Resp: 18 (05/01/17 1230)  BP: 135/65 (05/01/17 1230)  SpO2: 95 % (05/01/17 1230) Vital Signs (24h Range):  Temp:  [97.4 °F (36.3 °C)-98 °F (36.7 °C)] 97.4 °F (36.3 °C)  Pulse:  [] 86  Resp:  [16-20] 18  SpO2:  [91 %-100 %] 95 %  BP: (126-190)/(59-80) 135/65     Weight: 59 kg (130 lb)  Body mass index is 21.63 kg/(m^2).    Intake/Output Summary (Last 24 hours) at 05/01/17 1320  Last data filed at 05/01/17 1128   Gross per 24 hour   Intake             1005 ml   Output                0 ml   Net             1005 ml      Physical Exam   Constitutional: She appears well-developed and well-nourished. No distress.   Pleasant, frail-appearing   HENT:   Right Ear: External ear normal.   Left Ear: External ear normal.   Nose: Nose normal.   Mouth/Throat: Oropharynx is clear and moist.   Eyes: Conjunctivae and EOM are normal. Pupils are equal, round, and reactive to light. No scleral icterus.   Neck: Normal range of motion. Neck supple. No thyromegaly present.   Cardiovascular: Normal rate.  Exam reveals no friction rub.    Murmur heard.  Pulmonary/Chest: Effort normal and breath sounds normal. No respiratory distress. She has no wheezes. She has no rales.   Abdominal: Soft. Bowel sounds are normal. She exhibits no distension and no mass. There is no tenderness.   No hepatosplenomegaly   Musculoskeletal: Normal range of motion. She exhibits no edema or deformity.   Lymphadenopathy:     She has no cervical adenopathy.   Neurological: She is alert. No cranial nerve deficit.   Knows she is in the hospital. She does not know the year or the president.   Skin: Skin is warm and dry. No pallor.       Significant Labs:   CBC:   Recent Labs  Lab 04/30/17  0456 05/01/17  0419   WBC 14.97* 13.62*   HGB 11.0* 12.8   HCT 34.0* 38.9    293     CMP:   Recent  Labs  Lab 04/30/17  0456 05/01/17  0419    140   K 3.5 3.2*    108   CO2 22* 22*   GLU 83 72   BUN 7* 4*   CREATININE 0.7 0.6   CALCIUM 7.9* 8.4*   ANIONGAP 7* 10   EGFRNONAA >60 >60     TTE as above

## 2017-05-01 NOTE — NURSING
Sharona Acosta medical record number [id number] has been verified by JANIA Moses and ELODIA Green to be wearing tele box [id number]. Rhythm and correct information is visible on the monitor.

## 2017-05-01 NOTE — TRANSFER OF CARE
"Anesthesia Transfer of Care Note    Patient: Sharona Acosta    Procedure(s) Performed: Procedure(s) (LRB):  TRANSESOPHAGEAL ECHOCARDIOGRAM (CHINTAN) (N/A)    Patient location: Other: OR 6    Anesthesia Type: general    Post pain: adequate analgesia    Post assessment: no apparent anesthetic complications and tolerated procedure well    Post vital signs: stable    Level of consciousness: sedated and responds to stimulation    Nausea/Vomiting: no nausea/vomiting    Complications: none          Last vitals:   Visit Vitals    BP (!) 149/68 (BP Location: Right arm, BP Method: Automatic)    Pulse 85    Temp 36.5 °C (97.7 °F) (Oral)    Resp 17    Ht 5' 5" (1.651 m)    Wt 59 kg (130 lb)    SpO2 100%    Breastfeeding No    BMI 21.63 kg/m2     "

## 2017-05-01 NOTE — PROGRESS NOTES
[facility]  Progress Note    Patient Name: Sharona Acosta  MRN: [id number]  Patient Class: IP- Inpatient   Admission Date: 4/28/2017  Length of Stay: 3 days  Attending Physician: Rina Woodson MD  Primary Care Provider: Vianey Lee MD        Subjective:     Principal Problem:Bacterial pneumonia    HPI:  Sharona Acosta is a 81 wo woman with alcohol abuse, arthritis, atherosclerosis of the aorta, h/o compression fracture of spine, h/o stroke (1997), hypothyroidism, HLD, essential HTN, osteopenia who presented with her daughter who reports the patient has been experiencing 3 days of progressive weakness and confusion.  She normally ambulates independently but she has not been doing so with her walker over the last 3 days. She had a fall and was taken to [facility] where she was treated for a urinary tract infection w/ IV antibiotics and released 3 days ago. No oral antibiotic continued as an outpatient.  Patient currently resides in an independent living facility and there is concern that she is unable to take care of herself at home at this time.  Report of subjective fever.  The history is otherwise limited due to the condition of the patient.     In the emergency department routine laboratory studies and chest x-ray were obtained.  There is evidence of ill-defined bilateral lower interstitial opacities concerning for healthcare associated pneumonia.  She also had hypokalemia.  Her mental status did not improve while in the emergency department.  She is also noted to have a slightlyelevated troponin level.     Hospital medicine has been asked to admit for further evaluation and treatment.         Hospital Course:  She was found to have HCAP likely bacterial with sepsis based on CXR, leukocytosis, Tmax 100.9, and tachycardia. Empiric abx with vanc and cefepime were started. Blood cultures were collected. She had quick symptomatic improvement. Abx were deescalated  to moxifloxacin on 4/30.  She had a slightly elevated troponin. Cardiology consulted. Echo showed LVEF 55%, +DD, mild-mod AS, and Possible 1.5x1.1 cm mass noted in RA/IVC. CHINTAN on 5/1 to further investigate showed no evidence of RA/IVC/SVC mass (noted on TTE, ?prominent Chiari network vs intraesophageal) and grade 2 atheroma disease of aorta.   PT/OT recommended home health PT/OT on discharge. Previously I had discussed SNF placement with her daughter. We will see if this is still what they want and consult case management.      Review of Systems   Unable to perform ROS: Dementia     Objective:     Vital Signs (Most Recent):  Temp: 97.4 °F (36.3 °C) (05/01/17 1230)  Pulse: 86 (05/01/17 1230)  Resp: 18 (05/01/17 1230)  BP: 135/65 (05/01/17 1230)  SpO2: 95 % (05/01/17 1230) Vital Signs (24h Range):  Temp:  [97.4 °F (36.3 °C)-98 °F (36.7 °C)] 97.4 °F (36.3 °C)  Pulse:  [] 86  Resp:  [16-20] 18  SpO2:  [91 %-100 %] 95 %  BP: (126-190)/(59-80) 135/65     Weight: 59 kg (130 lb)  Body mass index is 21.63 kg/(m^2).    Intake/Output Summary (Last 24 hours) at 05/01/17 1320  Last data filed at 05/01/17 1128   Gross per 24 hour   Intake             1005 ml   Output                0 ml   Net             1005 ml      Physical Exam   Constitutional: She appears well-developed and well-nourished. No distress.   Pleasant, frail-appearing   HENT:   Right Ear: External ear normal.   Left Ear: External ear normal.   Nose: Nose normal.   Mouth/Throat: Oropharynx is clear and moist.   Eyes: Conjunctivae and EOM are normal. Pupils are equal, round, and reactive to light. No scleral icterus.   Neck: Normal range of motion. Neck supple. No thyromegaly present.   Cardiovascular: Normal rate.  Exam reveals no friction rub.    Murmur heard.  Pulmonary/Chest: Effort normal and breath sounds normal. No respiratory distress. She has no wheezes. She has no rales.   Abdominal: Soft. Bowel sounds are normal. She exhibits no distension and no  mass. There is no tenderness.   No hepatosplenomegaly   Musculoskeletal: Normal range of motion. She exhibits no edema or deformity.   Lymphadenopathy:     She has no cervical adenopathy.   Neurological: She is alert. No cranial nerve deficit.   Knows she is in the hospital. She does not know the year or the president.   Skin: Skin is warm and dry. No pallor.       Significant Labs:   CBC:   Recent Labs  Lab 04/30/17  0456 05/01/17  0419   WBC 14.97* 13.62*   HGB 11.0* 12.8   HCT 34.0* 38.9    293     CMP:   Recent Labs  Lab 04/30/17 0456 05/01/17  0419    140   K 3.5 3.2*    108   CO2 22* 22*   GLU 83 72   BUN 7* 4*   CREATININE 0.7 0.6   CALCIUM 7.9* 8.4*   ANIONGAP 7* 10   EGFRNONAA >60 >60     TTE as above    Assessment/Plan:      * Bacterial pneumonia  Chest x-ray notable for ill-defined bilateral lower lung interstitial opacities, concerning for pneumonia. Initially met sepsis criteria, now resolved.  D/C from [facility] 3 days ago. Chest x-ray indicates possible underlying history of pulmonary fibrosis.     HCAP (healthcare-associated pneumonia)  Vanc Cefepime deescalated to moxifloxacin 5/1. Improving. BCx NGTD.    Neutrophilic leukocytosis  Secondary to healthcare associated pneumonia    Encephalopathy, metabolic, resolved as of 4/30/2017  Secondary to sepsis with pneumonia, resolved.    Elevated troponin, resolved as of 5/1/2017  Likely cardiac strain associated with increased metabolic demand with acute infection and sepsis. Echo as above. Appreciate cardiology recs.     Hypertension, benign  Goal while inpatient is a systolic blood pressure less than 160mmHg. BP currently at goal. Hold home meds and start back as necessary.    Hyperlipidemia  Statin      Hypothyroid  Continued current home regimen. TFT with low T3 but otherwise normal.    H/O: stroke  Stable. Cont ASA and statin.    Mild cognitive impairment  Supportive care    Alcohol use  Chronic alcohol use. Monitor  for DTs as benzos may cause AMS as well. Banana bag ordered.    Hypokalemia  Replete and monitor      Nonrheumatic aortic valve stenosis  Mild-mod    Abnormal echocardiogram findings without diagnosis  CHINTAN as above    VTE Risk Mitigation         Ordered     Medium Risk of VTE  Once      04/29/17 0141     Place EVERETTE hose  Until discontinued      04/29/17 0141     Place sequential compression device  Until discontinued      04/29/17 0141          Rina Woodson MD  Department of Hospital Medicine   [facility]

## 2017-05-01 NOTE — ASSESSMENT & PLAN NOTE
Asymptomatic  Ischemic EKG changes noted vs prior tracing  Echo 4/30/17 with normal LV fxn and nor WMA  Cannot exclude demand  Will plan eventual MPI (may be as outpatient)

## 2017-05-01 NOTE — SUBJECTIVE & OBJECTIVE
Past Medical History:   Diagnosis Date    Alcohol abuse     Arthritis     osteoarthritis    Atherosclerosis of aorta     noted on lumbar X-ray 4/1/2013    Back pain     H/O: compression fracture of spine     H/O: stroke 1997    h/o stroke more than 15 yrs ago with no residual    Hyperlipidemia     Hypertension     Hypothyroidism     Leukocytosis     Neutrophilic leukocytosis 4/29/2017    Normal vaginal delivery     x2    Osteopenia     Sepsis due to HCAP pneumonia 4/29/2017    Thyroid disease     hypothyroidism    Urinary incontinence     Vitamin B 12 deficiency     Vitamin D deficiency        Past Surgical History:   Procedure Laterality Date    BREAST BIOPSY      FIXATION KYPHOPLASTY  Sept.  2012    HYSTERECTOMY      JOINT REPLACEMENT      right hip replacement    TOTAL HIP ARTHROPLASTY         Review of patient's allergies indicates:   Allergen Reactions    Lipitor [atorvastatin] Nausea Only       No current facility-administered medications on file prior to encounter.      Current Outpatient Prescriptions on File Prior to Encounter   Medication Sig    alendronate (FOSAMAX) 70 MG tablet TAKE 1 TABLET BY MOUTH EVERY 7 DAYS    levothyroxine (SYNTHROID) 75 MCG tablet TAKE 1 TABLET BY MOUTH EVERY DAY    simvastatin (ZOCOR) 40 MG tablet TAKE 1 TABLET BY MOUTH EVERY EVENING    verapamil (VERELAN) 240 MG C24P TAKE 1 CAPSULE BY MOUTH EVERY DAY    acetaminophen (TYLENOL) 325 MG tablet Take 2 tablets (650 mg total) by mouth every 8 (eight) hours as needed (Mild pain or T>100.4).    aspirin 81 MG Chew Take 81 mg by mouth once daily.      diazepam (VALIUM) 2 MG tablet Take 1 tab bid for 2 days, then 1 tab daily for 2 days then stop.    hydrocodone-acetaminophen 5-325mg (NORCO) 5-325 mg per tablet Take 1 tablet by mouth every 8 (eight) hours as needed (Severe pain).    polyethylene glycol (GLYCOLAX) 17 gram PwPk Take 17 g by mouth once daily.     Family History     Problem Relation (Age of  Onset)    Hypertension Mother, Father    Stroke Mother        Social History Main Topics    Smoking status: Former Smoker     Types: Cigarettes     Quit date: 5/15/1997    Smokeless tobacco: Never Used    Alcohol use 12.6 oz/week     21 Shots of liquor per week      Comment: occasional     Drug use: No    Sexual activity: Not on file     Review of Systems   Gastrointestinal: Negative for melena.   Genitourinary: Negative for hematuria.     Objective:     Vital Signs (Most Recent):  Temp: 97.7 °F (36.5 °C) (05/01/17 0334)  Pulse: 84 (05/01/17 0334)  Resp: 20 (05/01/17 0334)  BP: 127/69 (05/01/17 0334)  SpO2: (!) 92 % (05/01/17 0334) Vital Signs (24h Range):  Temp:  [97.7 °F (36.5 °C)-98.3 °F (36.8 °C)] 97.7 °F (36.5 °C)  Pulse:  [69-87] 84  Resp:  [18-20] 20  SpO2:  [91 %-97 %] 92 %  BP: (126-159)/(51-69) 127/69     Weight: 59 kg (130 lb)  Body mass index is 21.63 kg/(m^2).    SpO2: (!) 92 %  O2 Device (Oxygen Therapy): room air      Intake/Output Summary (Last 24 hours) at 05/01/17 0635  Last data filed at 04/30/17 1800   Gross per 24 hour   Intake             3480 ml   Output                0 ml   Net             3480 ml       Lines/Drains/Airways     Pressure Ulcer                 Pressure Ulcer 06/03/16 0739 Right ear, right Stage  days         Pressure Ulcer 06/03/16 0741 Right heel suspected deep tissue injury 331 days          Peripheral Intravenous Line                 Peripheral IV - Single Lumen 04/30/17 1041 Left Antecubital less than 1 day                Physical Exam   Constitutional: She is oriented to person, place, and time. She appears well-developed and well-nourished. No distress.   HENT:   Head: Normocephalic and atraumatic.   Mouth/Throat: No oropharyngeal exudate.   Eyes: Conjunctivae and EOM are normal. Pupils are equal, round, and reactive to light. No scleral icterus.   Neck: Normal range of motion. Neck supple. No JVD present. No tracheal deviation present.   Cardiovascular:  Normal rate, regular rhythm, S1 normal and S2 normal.  Exam reveals no gallop and no friction rub.    Murmur heard.   Early systolic murmur is present with a grade of 1/6   Pulmonary/Chest: Effort normal and breath sounds normal. No respiratory distress. She has no wheezes. She has no rales. She exhibits no tenderness.   Abdominal: Soft. Bowel sounds are normal. There is no tenderness.   Musculoskeletal: Normal range of motion. She exhibits no edema.   Neurological: She is alert and oriented to person, place, and time. No cranial nerve deficit.   ?memory impairment   Skin: Skin is warm and dry. She is not diaphoretic.   Psychiatric: She has a normal mood and affect. Her behavior is normal. Judgment normal.       Current Medications:   albuterol-ipratropium 2.5mg-0.5mg/3mL  3 mL Nebulization Q6H    aspirin  81 mg Oral Daily    dextromethorphan-guaifenesin  mg/5 ml  10 mL Oral Q6H    levothyroxine  75 mcg Oral Daily    moxifloxacin  400 mg Intravenous Q24H    simvastatin  40 mg Oral QHS    thiamine  100 mg Oral Daily    verapamil  180 mg Oral Daily      sodium chloride 0.9% 75 mL/hr at 04/30/17 2146     acetaminophen, acetaminophen, benzonatate, flu vacc [id number] 65yr up(PF), morphine, morphine, ondansetron    Laboratory:  CBC:    Recent Labs  Lab 04/29/17 0405 04/30/17  0456 05/01/17  0419   WHITE BLOOD CELL COUNT 18.63 H 14.97 H 13.62 H   HEMOGLOBIN 13.0 11.0 L 12.8   HEMATOCRIT 39.8 34.0 L 38.9   PLATELETS 195 232 293       CHEMISTRIES:    Recent Labs  Lab 04/28/17 1940 04/29/17  0405 04/30/17  0456   GLUCOSE 109 99 83   SODIUM 138 137 137   POTASSIUM 3.3 L 4.1 3.5   BUN BLD 11 12 7 L   CREATININE 0.8 0.8 0.7   EGFR IF  >60 >60 >60   EGFR IF NON- >60 >60 >60   CALCIUM 9.0 8.3 L 7.9 L   MAGNESIUM  --   --  1.5 L       CARDIAC BIOMARKERS:    Recent Labs  Lab 06/01/16  1830 04/28/17  1940 04/29/17  0045   CPK 51  --   --    TROPONIN I  --  0.044 H 0.047 H        COAGS:    Recent Labs  Lab 06/01/16 1830 04/28/17 1940   INR 1.0 1.0       LIPIDS/LFTS:    Recent Labs  Lab 09/30/15  1036 06/01/16 1830 04/28/17  1940 04/30/17  0456   CHOLESTEROL 176  --   --  101 L   TRIGLYCERIDES 79  --   --  60   HDL 79 H  --   --  42   LDL CHOLESTEROL 81.2  --   --  47.0 L   NON-HDL CHOLESTEROL 97  --   --  59   AST 23 21 17  --    ALT 12 11 7 L  --            Diagnostic Results:  ECG (personally reviewed tracings):   4/28/17 1917 , LAD/LVH, PRWP, TWI antlat/inf ?isch.  TWI new vs 6/2/16    Chest X-Ray (personally reviewed image(s)): 4/28/17  Increased ill-defined bilateral lower lung zone interstitial opacities, suspicious for edema or infectious/inflammatory disease.    Echo: 4/30/17 (images pers rev)    1 - Normal left ventricular systolic function (EF 55-60%).     2 - No wall motion abnormalities.     3 - Concentric remodeling.     4 - Impaired LV relaxation, elevated LAP (grade 2 diastolic dysfunction).     5 - Mild aortic stenosis, PETE = 1.48 cm2, peak velocity = 2.31 m/s, mean gradient = 11.69 mmHg.     6 - Trivial mitral regurgitation.     7 - Trivial tricuspid regurgitation.     8 - Pulmonary hypertension. The estimated PA systolic pressure is 50 mmHg.     9 - Atrial septal aneurysm .     10 - Possible 1.5x1.1 cm mass noted in RA/IVC.  Consider CHINTAN for further evaluation.

## 2017-05-01 NOTE — ASSESSMENT & PLAN NOTE
?mass in RA/IVC on echo 4/30/17  CHINTAN planned today  Risks, benefits and alternatives of the CHINTAN procedure were discussed with the patient (and her daughter Nona Roberto [phone]), including throat irritation, aspiration, anesthetic complications, esophageal irritation/perforation, etc.  Patient understands and agrees to proceed.  Consent was placed on the chart.

## 2017-05-01 NOTE — ASSESSMENT & PLAN NOTE
Chest x-ray notable for ill-defined bilateral lower lung interstitial opacities, concerning for pneumonia. Initially met sepsis criteria, now resolved.  D/C from [facility] 3 days ago. Chest x-ray indicates possible underlying history of pulmonary fibrosis.

## 2017-05-01 NOTE — PT/OT/SLP PROGRESS
Physical Therapy  Treatment    Sharona Acosta   MRN: [id number]   Admitting Diagnosis: Bacterial pneumonia    PT Received On: 17  PT Start Time: 1518     PT Stop Time: 1533    PT Total Time (min): 15 min       Billable Minutes:  Therapeutic Activity 15    Treatment Type: Treatment  PT/PTA: PTA     PTA Visit Number: 2       General Precautions: Standard, fall, contact  Orthopedic Precautions: N/A   Braces: N/A         Subjective:  Communicated with nurse Waite prior to session.  Pt agreeable to therapy.    Pain Ratin/10              Pain Rating Post-Intervention: 0/10    Objective:   Patient found with: telemetry, peripheral IV    Functional Mobility:  Bed Mobility:   Rolling/Turning to Left: Contact guard assistance  Scooting/Bridging: Minimum Assistance  Supine to Sit: Minimum Assistance  Sit to Supine: Minimum Assistance, With leg lift    Transfers: x 2 trials from bed. VC's for safety technique.   Sit <> Stand Assistance: Moderate Assistance  Sit <> Stand Assistive Device: Rolling Walker, No Assistive Device    Gait:   Gait Distance: 3-4 side steps to HOB. VC's for safety and sequence. Limited with gait training today 2* fatigue and sleepiness.   Assistance 1: Minimum assistance  Gait Assistive Device: Hand held assist  Gait Pattern: 3-point gait  Gait Deviation(s): decreased eliza, increased time in double stance, decreased velocity of limb motion, decreased step length, decreased toe-to-floor clearance, decreased swing-to-stance ratio, backward lean, decreased weight-shifting ability      Balance:   Static Sit: FAIR: Maintains without assist, but unable to take any challenges   Dynamic Sit: FAIR: Cannot move trunk without losing balance  Static Stand: POOR+: Needs MINIMAL assist to maintain  Dynamic stand: POOR: N/A     Therapeutic Activities and Exercises:  Pt performed bed mobility, transfer and gait as above.    encouraged pt to perform BLE AP while in supine.     AM-PAC 6 CLICK MOBILITY  How much  help from another person does this patient currently need?   1 = Unable, Total/Dependent Assistance  2 = A lot, Maximum/Moderate Assistance  3 = A little, Minimum/Contact Guard/Supervision  4 = None, Modified Travis/Independent    Turning over in bed (including adjusting bedclothes, sheets and blankets)?: 3  Sitting down on and standing up from a chair with arms (e.g., wheelchair, bedside commode, etc.): 3  Moving from lying on back to sitting on the side of the bed?: 3  Moving to and from a bed to a chair (including a wheelchair)?: 3  Need to walk in hospital room?: 3  Climbing 3-5 steps with a railing?: 3  Total Score: 18    AM-PAC Raw Score CMS G-Code Modifier Level of Impairment Assistance   6 % Total / Unable   7 - 9 CM 80 - 100% Maximal Assist   10 - 14 CL 60 - 80% Moderate Assist   15 - 19 CK 40 - 60% Moderate Assist   20 - 22 CJ 20 - 40% Minimal Assist   23 CI 1-20% SBA / CGA   24 CH 0% Independent/ Mod I     Patient left HOB elevated B heels off load  with all lines intact, call button in reach, bed alarm on and nurse Mariann present.    Assessment:  Sharona Acosta is a 81 y.o. female with a medical diagnosis of Bacterial pneumonia .    Rehab identified problem list/impairments: Rehab identified problem list/impairments: weakness, impaired endurance, gait instability, decreased lower extremity function, decreased upper extremity function, impaired cognition, decreased safety awareness, decreased ROM    Rehab potential is good.    Activity tolerance: Fair ( 2* fatigue and sleepiness )     Discharge recommendations:   home health PT (with 24 hour supervision)     Barriers to discharge:      Equipment recommendations:   bedside commode.     GOALS:   Physical Therapy Goals        Problem: Physical Therapy Goal    Goal Priority Disciplines Outcome Goal Variances Interventions   Physical Therapy Goal     PT/OT, PT Ongoing (interventions implemented as appropriate)     Description:  Goals to be met  by: 17     Patient will increase functional independence with mobility by performin. Supine to sit with Supervision  2. Rolling to Left and Right with Supervision  3. Sit to stand transfer with Supervision using RW  4. Bed to chair transfer with Supervision using Rolling Walker  5. Gait  x 150 feet with Supervision using Rolling Walker   6. Lower extremity exercise program x 20 reps per handout, with supervision                PLAN:    Patient to be seen daily  to address the above listed problems via gait training, therapeutic activities, therapeutic exercises  Plan of Care expires: 17  Plan of Care reviewed with: patient         Dahiana Reyes, PTA  2017

## 2017-05-02 PROBLEM — R53.81 PHYSICAL DECONDITIONING: Status: ACTIVE | Noted: 2017-05-02

## 2017-05-02 PROBLEM — A49.8 CLOSTRIDIUM DIFFICILE INFECTION: Status: ACTIVE | Noted: 2017-05-02

## 2017-05-02 LAB
ANION GAP SERPL CALC-SCNC: 11 MMOL/L
BACTERIA STL CULT: NORMAL
BASOPHILS # BLD AUTO: 0.05 K/UL
BASOPHILS NFR BLD: 0.3 %
BUN SERPL-MCNC: 5 MG/DL
C DIFF TOX GENS STL QL NAA+PROBE: POSITIVE
CALCIUM SERPL-MCNC: 8.5 MG/DL
CHLORIDE SERPL-SCNC: 107 MMOL/L
CO2 SERPL-SCNC: 21 MMOL/L
CREAT SERPL-MCNC: 0.6 MG/DL
DIFFERENTIAL METHOD: ABNORMAL
EOSINOPHIL # BLD AUTO: 0.1 K/UL
EOSINOPHIL NFR BLD: 0.7 %
ERYTHROCYTE [DISTWIDTH] IN BLOOD BY AUTOMATED COUNT: 16.7 %
EST. GFR  (AFRICAN AMERICAN): >60 ML/MIN/1.73 M^2
EST. GFR  (NON AFRICAN AMERICAN): >60 ML/MIN/1.73 M^2
GLUCOSE SERPL-MCNC: 78 MG/DL
HCT VFR BLD AUTO: 40.4 %
HGB BLD-MCNC: 13.4 G/DL
LYMPHOCYTES # BLD AUTO: 3.4 K/UL
LYMPHOCYTES NFR BLD: 22.1 %
MCH RBC QN AUTO: 29.9 PG
MCHC RBC AUTO-ENTMCNC: 33.2 %
MCV RBC AUTO: 90 FL
MONOCYTES # BLD AUTO: 1.7 K/UL
MONOCYTES NFR BLD: 11.3 %
NEUTROPHILS # BLD AUTO: 9.9 K/UL
NEUTROPHILS NFR BLD: 65.1 %
PLATELET # BLD AUTO: 294 K/UL
PMV BLD AUTO: 12.5 FL
POTASSIUM SERPL-SCNC: 4 MMOL/L
RBC # BLD AUTO: 4.48 M/UL
SODIUM SERPL-SCNC: 139 MMOL/L
TB INDURATION 48 - 72 HR READ: 0 MM
WBC # BLD AUTO: 15.28 K/UL

## 2017-05-02 PROCEDURE — 99233 SBSQ HOSP IP/OBS HIGH 50: CPT | Mod: ,,, | Performed by: INTERNAL MEDICINE

## 2017-05-02 PROCEDURE — 85025 COMPLETE CBC W/AUTO DIFF WBC: CPT

## 2017-05-02 PROCEDURE — 97535 SELF CARE MNGMENT TRAINING: CPT

## 2017-05-02 PROCEDURE — 36415 COLL VENOUS BLD VENIPUNCTURE: CPT

## 2017-05-02 PROCEDURE — 97530 THERAPEUTIC ACTIVITIES: CPT

## 2017-05-02 PROCEDURE — 80048 BASIC METABOLIC PNL TOTAL CA: CPT

## 2017-05-02 PROCEDURE — 12000002 HC ACUTE/MED SURGE SEMI-PRIVATE ROOM

## 2017-05-02 PROCEDURE — 25000003 PHARM REV CODE 250: Performed by: INTERNAL MEDICINE

## 2017-05-02 PROCEDURE — 94640 AIRWAY INHALATION TREATMENT: CPT

## 2017-05-02 PROCEDURE — 25000003 PHARM REV CODE 250: Performed by: EMERGENCY MEDICINE

## 2017-05-02 PROCEDURE — 25000003 PHARM REV CODE 250: Performed by: HOSPITALIST

## 2017-05-02 PROCEDURE — 97110 THERAPEUTIC EXERCISES: CPT

## 2017-05-02 PROCEDURE — 63600175 PHARM REV CODE 636 W HCPCS: Performed by: INTERNAL MEDICINE

## 2017-05-02 PROCEDURE — 94761 N-INVAS EAR/PLS OXIMETRY MLT: CPT

## 2017-05-02 PROCEDURE — 25000242 PHARM REV CODE 250 ALT 637 W/ HCPCS: Performed by: HOSPITALIST

## 2017-05-02 RX ORDER — MOXIFLOXACIN HYDROCHLORIDE 400 MG/250ML
400 INJECTION, SOLUTION INTRAVENOUS
Status: COMPLETED | OUTPATIENT
Start: 2017-05-02 | End: 2017-05-02

## 2017-05-02 RX ORDER — MOXIFLOXACIN HYDROCHLORIDE 400 MG/1
400 TABLET ORAL DAILY
Status: DISCONTINUED | OUTPATIENT
Start: 2017-05-03 | End: 2017-05-02

## 2017-05-02 RX ORDER — ENOXAPARIN SODIUM 100 MG/ML
40 INJECTION SUBCUTANEOUS EVERY 24 HOURS
Status: DISCONTINUED | OUTPATIENT
Start: 2017-05-03 | End: 2017-05-12 | Stop reason: HOSPADM

## 2017-05-02 RX ORDER — METRONIDAZOLE 500 MG/1
500 TABLET ORAL EVERY 8 HOURS
Status: DISCONTINUED | OUTPATIENT
Start: 2017-05-02 | End: 2017-05-12 | Stop reason: HOSPADM

## 2017-05-02 RX ORDER — FOLIC ACID 1 MG/1
1 TABLET ORAL DAILY
Status: DISCONTINUED | OUTPATIENT
Start: 2017-05-03 | End: 2017-05-12 | Stop reason: HOSPADM

## 2017-05-02 RX ORDER — MOXIFLOXACIN HYDROCHLORIDE 400 MG/1
400 TABLET ORAL DAILY
Status: COMPLETED | OUTPATIENT
Start: 2017-05-03 | End: 2017-05-06

## 2017-05-02 RX ADMIN — ASPIRIN 81 MG CHEWABLE TABLET 81 MG: 81 TABLET CHEWABLE at 09:05

## 2017-05-02 RX ADMIN — METRONIDAZOLE 500 MG: 500 TABLET ORAL at 09:05

## 2017-05-02 RX ADMIN — LEVOTHYROXINE SODIUM 75 MCG: 75 TABLET ORAL at 09:05

## 2017-05-02 RX ADMIN — SIMVASTATIN 40 MG: 40 TABLET, FILM COATED ORAL at 09:05

## 2017-05-02 RX ADMIN — IPRATROPIUM BROMIDE AND ALBUTEROL SULFATE 3 ML: .5; 3 SOLUTION RESPIRATORY (INHALATION) at 03:05

## 2017-05-02 RX ADMIN — MOXIFLOXACIN HYDROCHLORIDE 400 MG: 400 INJECTION, SOLUTION INTRAVENOUS at 01:05

## 2017-05-02 RX ADMIN — MOXIFLOXACIN HYDROCHLORIDE 400 MG: 400 INJECTION, SOLUTION INTRAVENOUS at 06:05

## 2017-05-02 RX ADMIN — THIAMINE HCL TAB 100 MG 100 MG: 100 TAB at 09:05

## 2017-05-02 RX ADMIN — GUAIFENESIN AND DEXTROMETHORPHAN 10 ML: 100; 10 SYRUP ORAL at 12:05

## 2017-05-02 RX ADMIN — METRONIDAZOLE 500 MG: 500 TABLET ORAL at 01:05

## 2017-05-02 RX ADMIN — VERAPAMIL HYDROCHLORIDE 180 MG: 180 TABLET, FILM COATED, EXTENDED RELEASE ORAL at 09:05

## 2017-05-02 NOTE — SUBJECTIVE & OBJECTIVE
Past Medical History:   Diagnosis Date    Alcohol abuse     Arthritis     osteoarthritis    Atherosclerosis of aorta     noted on lumbar X-ray 4/1/2013    Back pain     H/O: compression fracture of spine     H/O: stroke 1997    h/o stroke more than 15 yrs ago with no residual    Hyperlipidemia     Hypertension     Hypothyroidism     Leukocytosis     Neutrophilic leukocytosis 4/29/2017    Normal vaginal delivery     x2    Osteopenia     Sepsis due to HCAP pneumonia 4/29/2017    Thyroid disease     hypothyroidism    Urinary incontinence     Vitamin B 12 deficiency     Vitamin D deficiency        Past Surgical History:   Procedure Laterality Date    BREAST BIOPSY      FIXATION KYPHOPLASTY  Sept.  2012    HYSTERECTOMY      JOINT REPLACEMENT      right hip replacement    TOTAL HIP ARTHROPLASTY         Review of patient's allergies indicates:   Allergen Reactions    Lipitor [atorvastatin] Nausea Only       No current facility-administered medications on file prior to encounter.      Current Outpatient Prescriptions on File Prior to Encounter   Medication Sig    alendronate (FOSAMAX) 70 MG tablet TAKE 1 TABLET BY MOUTH EVERY 7 DAYS    levothyroxine (SYNTHROID) 75 MCG tablet TAKE 1 TABLET BY MOUTH EVERY DAY    simvastatin (ZOCOR) 40 MG tablet TAKE 1 TABLET BY MOUTH EVERY EVENING    verapamil (VERELAN) 240 MG C24P TAKE 1 CAPSULE BY MOUTH EVERY DAY    acetaminophen (TYLENOL) 325 MG tablet Take 2 tablets (650 mg total) by mouth every 8 (eight) hours as needed (Mild pain or T>100.4).    aspirin 81 MG Chew Take 81 mg by mouth once daily.      diazepam (VALIUM) 2 MG tablet Take 1 tab bid for 2 days, then 1 tab daily for 2 days then stop.    hydrocodone-acetaminophen 5-325mg (NORCO) 5-325 mg per tablet Take 1 tablet by mouth every 8 (eight) hours as needed (Severe pain).    polyethylene glycol (GLYCOLAX) 17 gram PwPk Take 17 g by mouth once daily.     Family History     Problem Relation (Age of  Onset)    Hypertension Mother, Father    Stroke Mother        Social History Main Topics    Smoking status: Former Smoker     Types: Cigarettes     Quit date: 5/15/1997    Smokeless tobacco: Never Used    Alcohol use 12.6 oz/week     21 Shots of liquor per week      Comment: occasional     Drug use: No    Sexual activity: Not on file     Review of Systems   Gastrointestinal: Negative for melena.   Genitourinary: Negative for hematuria.     Objective:     Vital Signs (Most Recent):  Temp: 98.4 °F (36.9 °C) (05/02/17 0356)  Pulse: 88 (Simultaneous filing. User may not have seen previous data.) (05/02/17 0356)  Resp: 16 (Simultaneous filing. User may not have seen previous data.) (05/02/17 0356)  BP: (!) 150/82 (05/02/17 0415)  SpO2: (!) 92 % (Simultaneous filing. User may not have seen previous data.) (05/02/17 0356) Vital Signs (24h Range):  Temp:  [97.4 °F (36.3 °C)-98.7 °F (37.1 °C)] 98.4 °F (36.9 °C)  Pulse:  [] 88  Resp:  [16-20] 16  SpO2:  [92 %-100 %] 92 %  BP: (124-190)/(60-82) 150/82     Weight: 59 kg (130 lb)  Body mass index is 21.63 kg/(m^2).    SpO2: (!) 92 % (Simultaneous filing. User may not have seen previous data.)  O2 Device (Oxygen Therapy): room air      Intake/Output Summary (Last 24 hours) at 05/02/17 0625  Last data filed at 05/02/17 0000   Gross per 24 hour   Intake              535 ml   Output                0 ml   Net              535 ml       Lines/Drains/Airways     Pressure Ulcer                 Pressure Ulcer 06/03/16 0739 Right ear, right Stage  days         Pressure Ulcer 06/03/16 0741 Right heel suspected deep tissue injury 332 days          Peripheral Intravenous Line                 Peripheral IV - Single Lumen 04/30/17 1041 Left Antecubital 1 day                Physical Exam   Constitutional: She is oriented to person, place, and time. She appears well-developed and well-nourished. No distress.   HENT:   Head: Normocephalic and atraumatic.   Mouth/Throat: No  oropharyngeal exudate.   Eyes: Conjunctivae and EOM are normal. Pupils are equal, round, and reactive to light. No scleral icterus.   Neck: Normal range of motion. Neck supple. No JVD present. No tracheal deviation present.   Cardiovascular: Normal rate, regular rhythm, S1 normal and S2 normal.  Exam reveals no gallop and no friction rub.    Murmur heard.   Early systolic murmur is present with a grade of 1/6   Pulmonary/Chest: Effort normal and breath sounds normal. No respiratory distress. She has no wheezes. She has no rales. She exhibits no tenderness.   Abdominal: Soft. Bowel sounds are normal. There is no tenderness.   Musculoskeletal: Normal range of motion. She exhibits no edema.   Neurological: She is alert and oriented to person, place, and time. No cranial nerve deficit.   ?memory impairment   Skin: Skin is warm and dry. She is not diaphoretic.   Psychiatric: She has a normal mood and affect. Her behavior is normal. Judgment normal.       Current Medications:   albuterol-ipratropium 2.5mg-0.5mg/3mL  3 mL Nebulization Q6H    aspirin  81 mg Oral Daily    dextromethorphan-guaifenesin  mg/5 ml  10 mL Oral Q6H    levothyroxine  75 mcg Oral Daily    moxifloxacin  400 mg Intravenous Q24H    simvastatin  40 mg Oral QHS    thiamine  100 mg Oral Daily    verapamil  180 mg Oral Daily      sodium chloride 0.9% 75 mL/hr at 05/01/17 2329     acetaminophen, acetaminophen, benzonatate, flu vacc [id number] 65yr up(PF), lorazepam, morphine, morphine, ondansetron    Laboratory:  CBC:    Recent Labs  Lab 04/30/17  0456 05/01/17  0419 05/02/17  0253   WHITE BLOOD CELL COUNT 14.97 H 13.62 H 15.28 H   HEMOGLOBIN 11.0 L 12.8 13.4   HEMATOCRIT 34.0 L 38.9 40.4   PLATELETS 232 293 294       CHEMISTRIES:    Recent Labs  Lab 04/30/17  0456 05/01/17  0419 05/02/17  0253   GLUCOSE 83 72 78   SODIUM 137 140 139   POTASSIUM 3.5 3.2 L 4.0   BUN BLD 7 L 4 L 5 L   CREATININE 0.7 0.6 0.6   EGFR IF AFR[place] AMERICAN >60 >60 >60    EGFR IF NON- >60 >60 >60   CALCIUM 7.9 L 8.4 L 8.5 L   MAGNESIUM 1.5 L  --   --        CARDIAC BIOMARKERS:    Recent Labs  Lab 06/01/16 1830 04/28/17 1940 04/29/17  0045   CPK 51  --   --    TROPONIN I  --  0.044 H 0.047 H       COAGS:    Recent Labs  Lab 06/01/16 1830 04/28/17 1940   INR 1.0 1.0       LIPIDS/LFTS:    Recent Labs  Lab 09/30/15  1036 06/01/16 1830 04/28/17 1940 04/30/17  0456   CHOLESTEROL 176  --   --  101 L   TRIGLYCERIDES 79  --   --  60   HDL 79 H  --   --  42   LDL CHOLESTEROL 81.2  --   --  47.0 L   NON-HDL CHOLESTEROL 97  --   --  59   AST 23 21 17  --    ALT 12 11 7 L  --            Diagnostic Results:  ECG (personally reviewed tracings):   4/28/17 1917 , LAD/LVH, PRWP, TWI antlat/inf ?isch.  TWI new vs 6/2/16    Chest X-Ray (personally reviewed image(s)): 4/28/17  Increased ill-defined bilateral lower lung zone interstitial opacities, suspicious for edema or infectious/inflammatory disease.    CHINTAN 5/1/17 (images pers rev)    1 - Normal left ventricular systolic function (EF 60-65%).     2 - Moderate aortic stenosis, PETE = 1.3 cm2 by planimetry.     3 - Mild aortic regurgitation.     4 - Mild mitral regurgitation.     5 - Mild tricuspid regurgitation.     6 - Grade 2 atheroma disease of aorta.     7 - No evidence of RA/IVC/SVC mass (noted on TTE, ?prominent Chiari network vs intraesophageal).    Echo: 4/30/17 (images pers rev)    1 - Normal left ventricular systolic function (EF 55-60%).     2 - No wall motion abnormalities.     3 - Concentric remodeling.     4 - Impaired LV relaxation, elevated LAP (grade 2 diastolic dysfunction).     5 - Mild aortic stenosis, PETE = 1.48 cm2, peak velocity = 2.31 m/s, mean gradient = 11.69 mmHg.     6 - Trivial mitral regurgitation.     7 - Trivial tricuspid regurgitation.     8 - Pulmonary hypertension. The estimated PA systolic pressure is 50 mmHg.     9 - Atrial septal aneurysm .     10 - Possible 1.5x1.1 cm mass noted in  RA/IVC.  Consider CHINTAN for further evaluation.

## 2017-05-02 NOTE — ASSESSMENT & PLAN NOTE
Patient at independent living facility prior to admission. It appears patient requires 24-hour assistance and skilled PT. No 24-hour assistance currently offered at either independent nor assisted living facility. No 24-hour assistance available at home. I will place consult for SNF.

## 2017-05-02 NOTE — PROGRESS NOTES
JOSE ENRIQUE contacted pt daughter Nona @ [phone], but she did not answer the phone. SW left a message and awaiting a call back. JOSE ENRIQUE contacted pt  Rick @ [phone]  But SW was informed it was the wrong number. SW contacted the home phone number listed for Rick and no on answered. The phone only rung, voice-mail was not setup and SW unable to leave a message.

## 2017-05-02 NOTE — ASSESSMENT & PLAN NOTE
Chest x-ray notable for ill-defined bilateral lower lung interstitial opacities, concerning for pneumonia. Initially met sepsis criteria, now resolved.  D/C from [facility] recently. Chest x-ray indicates possible underlying history of pulmonary fibrosis. Changed moxi to PO. Currently stable at room air

## 2017-05-02 NOTE — PROGRESS NOTES
JOSE ENRIQUE contacted pt daughter Nona to discuss pt discharge plans. Nona did not answer and JOSE ENRIQUE left a message. JOSE ENRIQUE contacted pt daughter Adali and was sent straight to voice-mail. JOSE ENRIQUE will continue to contact family members.

## 2017-05-02 NOTE — SUBJECTIVE & OBJECTIVE
Review of Systems   Respiratory: Negative.    Cardiovascular: Negative.    Gastrointestinal: Negative.      Objective:     Vital Signs (Most Recent):  Temp: 98 °F (36.7 °C) (05/02/17 1625)  Pulse: 89 (05/02/17 1625)  Resp: 17 (05/02/17 1625)  BP: 128/64 (05/02/17 1625)  SpO2: 95 % (05/02/17 1625) Vital Signs (24h Range):  Temp:  [98 °F (36.7 °C)-98.7 °F (37.1 °C)] 98 °F (36.7 °C)  Pulse:  [82-89] 89  Resp:  [16-20] 17  SpO2:  [92 %-96 %] 95 %  BP: (117-182)/(56-82) 128/64     Weight: 59 kg (130 lb)  Body mass index is 21.63 kg/(m^2).    Intake/Output Summary (Last 24 hours) at 05/02/17 1723  Last data filed at 05/02/17 1327   Gross per 24 hour   Intake          1076.25 ml   Output                0 ml   Net          1076.25 ml      Physical Exam   Constitutional: She appears well-developed and well-nourished. No distress.   Pleasant, frail-appearing   HENT:   Right Ear: External ear normal.   Left Ear: External ear normal.   Nose: Nose normal.   Mouth/Throat: Oropharynx is clear and moist.   Eyes: Conjunctivae and EOM are normal. Pupils are equal, round, and reactive to light. No scleral icterus.   Neck: Normal range of motion. Neck supple. No thyromegaly present.   Cardiovascular: Normal rate.  Exam reveals no friction rub.    Murmur heard.  Pulmonary/Chest: Effort normal and breath sounds normal. No respiratory distress. She has no wheezes. She has no rales.   Abdominal: Soft. Bowel sounds are normal. She exhibits no distension and no mass. There is no tenderness.   No hepatosplenomegaly   Musculoskeletal: Normal range of motion. She exhibits no edema or deformity.   Lymphadenopathy:     She has no cervical adenopathy.   Neurological: She is alert. No cranial nerve deficit.   Oriented to self and place. Follows simple commands and answers questions appropriately   Skin: Skin is warm and dry. No pallor.   Nursing note and vitals reviewed.      Significant Labs:   CBC:     Recent Labs  Lab 05/01/17  2763  05/02/17  0253   WBC 13.62* 15.28*   HGB 12.8 13.4   HCT 38.9 40.4    294     CMP:     Recent Labs  Lab 05/01/17  0419 05/02/17 0253    139   K 3.2* 4.0    107   CO2 22* 21*   GLU 72 78   BUN 4* 5*   CREATININE 0.6 0.6   CALCIUM 8.4* 8.5*   ANIONGAP 10 11   EGFRNONAA >60 >60     TTE as above

## 2017-05-02 NOTE — PROGRESS NOTES
[facility] Medicine  Progress Note    Patient Name: Sharona Acosta  MRN: [id number]  Patient Class: IP- Inpatient   Admission Date: 4/28/2017  Length of Stay: 4 days  Attending Physician: Maria T Hester MD  Primary Care Provider: Vianey Lee MD        Subjective:     Principal Problem:Bacterial pneumonia    HPI:  Sharona Acosta is a 81 wo woman with alcohol abuse, arthritis, atherosclerosis of the aorta, h/o compression fracture of spine, h/o stroke (1997), hypothyroidism, HLD, essential HTN, osteopenia who presented with her daughter who reports the patient has been experiencing 3 days of progressive weakness and confusion.  She normally ambulates independently but she has not been doing so with her walker over the last 3 days. She had a fall and was taken to [facility] where she was treated for a urinary tract infection w/ IV antibiotics and released 3 days ago. No oral antibiotic continued as an outpatient.  Patient currently resides in an independent living facility and there is concern that she is unable to take care of herself at home at this time.  Report of subjective fever.  The history is otherwise limited due to the condition of the patient.     In the emergency department routine laboratory studies and chest x-ray were obtained.  There is evidence of ill-defined bilateral lower interstitial opacities concerning for healthcare associated pneumonia.  She also had hypokalemia.  Her mental status did not improve while in the emergency department.  She is also noted to have a slightlyelevated troponin level.     Hospital medicine has been asked to admit for further evaluation and treatment.         Hospital Course:  She was found to have HCAP likely bacterial with sepsis based on CXR, leukocytosis, Tmax 100.9, and tachycardia. Empiric abx with vanc and cefepime were started. Blood cultures were collected. She had quick symptomatic improvement. Abx were  deescalated to moxifloxacin on 4/30.  She had a slightly elevated troponin. Cardiology consulted. Echo showed LVEF 55%, +DD, mild-mod AS, and Possible 1.5x1.1 cm mass noted in RA/IVC. CHINTAN on 5/1 to further investigate showed no evidence of RA/IVC/SVC mass (noted on TTE, ?prominent Chiari network vs intraesophageal) and grade 2 atheroma disease of aorta.   PT/OT recommended home health PT/OT on discharge. Previously I had discussed SNF placement with her daughter. We will see if this is still what they want and consult case management.      Review of Systems   Respiratory: Negative.    Cardiovascular: Negative.    Gastrointestinal: Negative.      Objective:     Vital Signs (Most Recent):  Temp: 98 °F (36.7 °C) (05/02/17 1625)  Pulse: 89 (05/02/17 1625)  Resp: 17 (05/02/17 1625)  BP: 128/64 (05/02/17 1625)  SpO2: 95 % (05/02/17 1625) Vital Signs (24h Range):  Temp:  [98 °F (36.7 °C)-98.7 °F (37.1 °C)] 98 °F (36.7 °C)  Pulse:  [82-89] 89  Resp:  [16-20] 17  SpO2:  [92 %-96 %] 95 %  BP: (117-182)/(56-82) 128/64     Weight: 59 kg (130 lb)  Body mass index is 21.63 kg/(m^2).    Intake/Output Summary (Last 24 hours) at 05/02/17 1723  Last data filed at 05/02/17 1327   Gross per 24 hour   Intake          1076.25 ml   Output                0 ml   Net          1076.25 ml      Physical Exam   Constitutional: She appears well-developed and well-nourished. No distress.   Pleasant, frail-appearing   HENT:   Right Ear: External ear normal.   Left Ear: External ear normal.   Nose: Nose normal.   Mouth/Throat: Oropharynx is clear and moist.   Eyes: Conjunctivae and EOM are normal. Pupils are equal, round, and reactive to light. No scleral icterus.   Neck: Normal range of motion. Neck supple. No thyromegaly present.   Cardiovascular: Normal rate.  Exam reveals no friction rub.    Murmur heard.  Pulmonary/Chest: Effort normal and breath sounds normal. No respiratory distress. She has no wheezes. She has no rales.   Abdominal: Soft.  Bowel sounds are normal. She exhibits no distension and no mass. There is no tenderness.   No hepatosplenomegaly   Musculoskeletal: Normal range of motion. She exhibits no edema or deformity.   Lymphadenopathy:     She has no cervical adenopathy.   Neurological: She is alert. No cranial nerve deficit.   Oriented to self and place. Follows simple commands and answers questions appropriately   Skin: Skin is warm and dry. No pallor.   Nursing note and vitals reviewed.      Significant Labs:   CBC:     Recent Labs  Lab 05/01/17 0419 05/02/17  0253   WBC 13.62* 15.28*   HGB 12.8 13.4   HCT 38.9 40.4    294     CMP:     Recent Labs  Lab 05/01/17 0419 05/02/17  0253    139   K 3.2* 4.0    107   CO2 22* 21*   GLU 72 78   BUN 4* 5*   CREATININE 0.6 0.6   CALCIUM 8.4* 8.5*   ANIONGAP 10 11   EGFRNONAA >60 >60     TTE as above    Assessment/Plan:      * Bacterial pneumonia  Chest x-ray notable for ill-defined bilateral lower lung interstitial opacities, concerning for pneumonia. Initially met sepsis criteria, now resolved.  D/C from [facility] recently. Chest x-ray indicates possible underlying history of pulmonary fibrosis. Changed moxi to PO. Currently stable at room air    Hypertension, benign  Goal while inpatient is a systolic blood pressure less than 160mmHg. BP currently at goal. Continue verapamil    Hyperlipidemia  Statin      Hypothyroid  Continued current home regimen. TFT with low T3 but otherwise normal.    H/O: stroke  Stable. Cont ASA and statin.    Mild cognitive impairment  Supportive care    Alcohol use  Chronic alcohol use. Monitor for DTs as benzos may cause AMS as well. No signs of this so far. Thiamine, folic acid and MVI    Hypokalemia  Replete and monitor      Neutrophilic leukocytosis  Secondary to healthcare associated pneumonia    Elevated troponin  Likely cardiac strain associated with increased metabolic demand with acute infection and sepsis. Is asymptomatic. Echo  with normal LV function and no wall motion abnormalities. Did show an intracardiac mass. Unknown cause or etiology. Currently not causing any symptoms. To be re-evaluated as outpatient. MPI as outpatinent. Appreciate cardiology recs.     HCAP (healthcare-associated pneumonia)  Vanc Cefepime deescalated to moxifloxacin 5/1. Improving. BCx NGTD.    Nonrheumatic aortic valve stenosis  Mild-mod    Abnormal echocardiogram findings without diagnosis  CHINTAN as above    Physical deconditioning  Patient at independent living facility prior to admission. It appears patient requires 24-hour assistance and skilled PT. No 24-hour assistance currently offered at either independent nor assisted living facility. No 24-hour assistance available at home. I will place consult for SNF.       Clostridium difficile infection  Having at least 5BMs daily. Will start flagyl.       VTE Risk Mitigation         Ordered     Medium Risk of VTE  Once      04/29/17 0141     Place EVERETTE hose  Until discontinued      04/29/17 0141     Place sequential compression device  Until discontinued      04/29/17 0141          Maria T Kenney MD  Department of Hospital Medicine   [facility]

## 2017-05-02 NOTE — PLAN OF CARE
Problem: Occupational Therapy Goal  Goal: Occupational Therapy Goal  Goals to be met by: 5.10.17     Patient will increase functional independence with ADLs by performing:    Feeding with Set-up Assistance.  UE Dressing with Minimal Assistance.  Grooming while seated with Contact Guard Assistance.  Rolling to Right with Set-up Assistance.   Upper extremity exercise program x10 reps per handout, with assistance as needed.   Outcome: Ongoing (interventions implemented as appropriate)  OT continues to recommend home with home health OT with 24 hr care and supervision. Pt is severely disoriented and does not have carry over.

## 2017-05-02 NOTE — PT/OT/SLP PROGRESS
Occupational Therapy  Treatment    Sharona Acosta   MRN: [id number]   Admitting Diagnosis: Bacterial pneumonia    OT Date of Treatment: 17   OT Start Time: 830  OT Stop Time: 855  OT Total Time (min): 25 min    Billable Minutes:  Self Care/Home Management 25    General Precautions: Standard, contact, fall  Orthopedic Precautions: Full weight bearing  Braces: N/A         Subjective:  Communicated with Mariion prior to session.    Pain Ratin/10              Pain Rating Post-Intervention: 0/10    Objective:  Patient found with: telemetry, peripheral IV     Functional Mobility:  Bed Mobility:  Rolling/Turning to Left: Contact guard assistance  Rolling/Turning Right: Contact guard assistance  Scooting/Bridging: Minimum Assistance  Supine to Sit: Minimum Assistance  Sit to Supine: Minimum Assistance    Transfers:   Sit <> Stand Assistance: Minimum Assistance  Sit <> Stand Assistive Device: No Assistive Device    Functional Ambulation: Pt able to take several step along the side of bed.    Activities of Daily Living:  Feeding Level of Assistance: Stand by assistance  UE Dressing Level of Assistance: Minimum assistance  LE Dressing Level of Assistance: Total assistance  Grooming Position: Seated  Grooming Level of Assistance: Minimum assistance   Balance:   Static Sit: GOOD-: Takes MODERATE challenges from all directions but inconsistently  Dynamic Sit: GOOD-: Maintains balance through MODERATE excursions of active trunk movement,     Static Stand: POOR+: Needs MINIMAL assist to maintain  Dynamic stand: FAIR: Needs CONTACT GUARD during gait      AM-PAC 6 CLICK ADL   How much help from another person does this patient currently need?   1 = Unable, Total/Dependent Assistance  2 = A lot, Maximum/Moderate Assistance  3 = A little, Minimum/Contact Guard/Supervision  4 = None, Modified [place]/Independent    Putting on and taking off regular lower body clothing? : 1  Bathing (including washing, rinsing, drying)?:  1  Toileting, which includes using toilet, bedpan, or urinal? : 1  Putting on and taking off regular upper body clothing?: 2  Taking care of personal grooming such as brushing teeth?: 3  Eating meals?: 3  Total Score: 11     AM-PAC Raw Score CMS G-Code Modifier Level of Impairment Assistance   6 % Total / Unable   7 - 9 CM 80 - 100% Maximal Assist   10 - 14 CL 60 - 80% Moderate Assist   15 - 19 CK 40 - 60% Moderate Assist   20 - 22 CJ 20 - 40% Minimal Assist   23 CI 1-20% SBA / CGA   24 CH 0% Independent/ Mod I     Patient left supine with all lines intact and call button in reach    ASSESSMENT:  Sharona Acosta is a 81 y.o. female with a medical diagnosis of Bacterial pneumonia and presents with decreased safety aware. Pt would benefit from 24 hour retirement care if family is unable to care for patient.    Rehab identified problem list/impairments: Rehab identified problem list/impairments: weakness, impaired endurance, impaired self care skills, impaired functional mobilty, gait instability, impaired balance, impaired cognition, decreased lower extremity function, decreased safety awareness, impaired coordination, impaired joint extensibility    Rehab potential is fair.    Activity tolerance: Good    Discharge recommendations: Discharge Facility/Level Of Care Needs: home health OT     Barriers to discharge: Barriers to Discharge: Other (Comment) (pt has decreased safety awareness)    Equipment recommendations: bedside commode     GOALS:   Occupational Therapy Goals        Problem: Occupational Therapy Goal    Goal Priority Disciplines Outcome Interventions   Occupational Therapy Goal     OT, PT/OT Ongoing (interventions implemented as appropriate)    Description:  Goals to be met by: 5.10.17     Patient will increase functional independence with ADLs by performing:    Feeding with Set-up Assistance.  UE Dressing with Minimal Assistance.  Grooming while seated with Contact Guard Assistance.  Rolling to  Right with Set-up Assistance.   Upper extremity exercise program x10 reps per handout, with assistance as needed.                Plan:  Patient to be seen 5 x/week to address the above listed problems via self-care/home management, therapeutic activities, therapeutic exercises  Plan of Care expires: 05/12/17  Plan of Care reviewed with: patient         Susan Garg OT  05/02/2017

## 2017-05-02 NOTE — PLAN OF CARE
05/02/17 1402   Medicare Message   Important Message from Medicare regarding Discharge Appeal Rights Given to patient/caregiver;Explained to patient/caregiver   Date IMM was signed 05/02/17     SW spoke with pt daughter Nona to explain IMM. Nona verbalized understanding. SW informed Nona SW will place time and date of conversation on IMM and place in pt chart.

## 2017-05-02 NOTE — PROGRESS NOTES
[facility]  Cardiology  Progress Note    Patient Name: Sharona Acosta  MRN: [id number]  Admission Date: 4/28/2017  Hospital Length of Stay: 4 days  Code Status: Full Code   Attending Physician: Maria T Hester MD   Primary Care Physician: Vianey Lee MD  Expected Discharge Date:   Principal Problem:Bacterial pneumonia    Subjective:     Hospital Course:   No events.  No cp/sob.  CHINTAN yesterday without intracardiac mass.    Tele: NSR, PVCs      Past Medical History:   Diagnosis Date    Alcohol abuse     Arthritis     osteoarthritis    Atherosclerosis of aorta     noted on lumbar X-ray 4/1/2013    Back pain     H/O: compression fracture of spine     H/O: stroke 1997    h/o stroke more than 15 yrs ago with no residual    Hyperlipidemia     Hypertension     Hypothyroidism     Leukocytosis     Neutrophilic leukocytosis 4/29/2017    Normal vaginal delivery     x2    Osteopenia     Sepsis due to HCAP pneumonia 4/29/2017    Thyroid disease     hypothyroidism    Urinary incontinence     Vitamin B 12 deficiency     Vitamin D deficiency        Past Surgical History:   Procedure Laterality Date    BREAST BIOPSY      FIXATION KYPHOPLASTY  Sept. 2012    HYSTERECTOMY      JOINT REPLACEMENT      right hip replacement    TOTAL HIP ARTHROPLASTY         Review of patient's allergies indicates:   Allergen Reactions    Lipitor [atorvastatin] Nausea Only       No current facility-administered medications on file prior to encounter.      Current Outpatient Prescriptions on File Prior to Encounter   Medication Sig    alendronate (FOSAMAX) 70 MG tablet TAKE 1 TABLET BY MOUTH EVERY 7 DAYS    levothyroxine (SYNTHROID) 75 MCG tablet TAKE 1 TABLET BY MOUTH EVERY DAY    simvastatin (ZOCOR) 40 MG tablet TAKE 1 TABLET BY MOUTH EVERY EVENING    verapamil (VERELAN) 240 MG C24P TAKE 1 CAPSULE BY MOUTH EVERY DAY    acetaminophen (TYLENOL) 325 MG tablet Take 2 tablets (650 mg total) by mouth every 8  (eight) hours as needed (Mild pain or T>100.4).    aspirin 81 MG Chew Take 81 mg by mouth once daily.      diazepam (VALIUM) 2 MG tablet Take 1 tab bid for 2 days, then 1 tab daily for 2 days then stop.    hydrocodone-acetaminophen 5-325mg (NORCO) 5-325 mg per tablet Take 1 tablet by mouth every 8 (eight) hours as needed (Severe pain).    polyethylene glycol (GLYCOLAX) 17 gram PwPk Take 17 g by mouth once daily.     Family History     Problem Relation (Age of Onset)    Hypertension Mother, Father    Stroke Mother        Social History Main Topics    Smoking status: Former Smoker     Types: Cigarettes     Quit date: 5/15/1997    Smokeless tobacco: Never Used    Alcohol use 12.6 oz/week     21 Shots of liquor per week      Comment: occasional     Drug use: No    Sexual activity: Not on file     Review of Systems   Gastrointestinal: Negative for melena.   Genitourinary: Negative for hematuria.     Objective:     Vital Signs (Most Recent):  Temp: 98.4 °F (36.9 °C) (05/02/17 0356)  Pulse: 88 (Simultaneous filing. User may not have seen previous data.) (05/02/17 0356)  Resp: 16 (Simultaneous filing. User may not have seen previous data.) (05/02/17 0356)  BP: (!) 150/82 (05/02/17 0415)  SpO2: (!) 92 % (Simultaneous filing. User may not have seen previous data.) (05/02/17 0356) Vital Signs (24h Range):  Temp:  [97.4 °F (36.3 °C)-98.7 °F (37.1 °C)] 98.4 °F (36.9 °C)  Pulse:  [] 88  Resp:  [16-20] 16  SpO2:  [92 %-100 %] 92 %  BP: (124-190)/(60-82) 150/82     Weight: 59 kg (130 lb)  Body mass index is 21.63 kg/(m^2).    SpO2: (!) 92 % (Simultaneous filing. User may not have seen previous data.)  O2 Device (Oxygen Therapy): room air      Intake/Output Summary (Last 24 hours) at 05/02/17 0625  Last data filed at 05/02/17 0000   Gross per 24 hour   Intake              535 ml   Output                0 ml   Net              535 ml       Lines/Drains/Airways     Pressure Ulcer                 Pressure Ulcer 06/03/16  0739 Right ear, right Stage  days         Pressure Ulcer 06/03/16 0741 Right heel suspected deep tissue injury 332 days          Peripheral Intravenous Line                 Peripheral IV - Single Lumen 04/30/17 1041 Left Antecubital 1 day                Physical Exam   Constitutional: She is oriented to person, place, and time. She appears well-developed and well-nourished. No distress.   HENT:   Head: Normocephalic and atraumatic.   Mouth/Throat: No oropharyngeal exudate.   Eyes: Conjunctivae and EOM are normal. Pupils are equal, round, and reactive to light. No scleral icterus.   Neck: Normal range of motion. Neck supple. No JVD present. No tracheal deviation present.   Cardiovascular: Normal rate, regular rhythm, S1 normal and S2 normal.  Exam reveals no gallop and no friction rub.    Murmur heard.   Early systolic murmur is present with a grade of 1/6   Pulmonary/Chest: Effort normal and breath sounds normal. No respiratory distress. She has no wheezes. She has no rales. She exhibits no tenderness.   Abdominal: Soft. Bowel sounds are normal. There is no tenderness.   Musculoskeletal: Normal range of motion. She exhibits no edema.   Neurological: She is alert and oriented to person, place, and time. No cranial nerve deficit.   ?memory impairment   Skin: Skin is warm and dry. She is not diaphoretic.   Psychiatric: She has a normal mood and affect. Her behavior is normal. Judgment normal.       Current Medications:   albuterol-ipratropium 2.5mg-0.5mg/3mL  3 mL Nebulization Q6H    aspirin  81 mg Oral Daily    dextromethorphan-guaifenesin  mg/5 ml  10 mL Oral Q6H    levothyroxine  75 mcg Oral Daily    moxifloxacin  400 mg Intravenous Q24H    simvastatin  40 mg Oral QHS    thiamine  100 mg Oral Daily    verapamil  180 mg Oral Daily      sodium chloride 0.9% 75 mL/hr at 05/01/17 2329     acetaminophen, acetaminophen, benzonatate, flu vacc [id number] 65yr up(PF), lorazepam, morphine, morphine,  ondansetron    Laboratory:  CBC:    Recent Labs  Lab 04/30/17  0456 05/01/17  0419 05/02/17  0253   WHITE BLOOD CELL COUNT 14.97 H 13.62 H 15.28 H   HEMOGLOBIN 11.0 L 12.8 13.4   HEMATOCRIT 34.0 L 38.9 40.4   PLATELETS 232 293 294       CHEMISTRIES:    Recent Labs  Lab 04/30/17  0456 05/01/17  0419 05/02/17  0253   GLUCOSE 83 72 78   SODIUM 137 140 139   POTASSIUM 3.5 3.2 L 4.0   BUN BLD 7 L 4 L 5 L   CREATININE 0.7 0.6 0.6   EGFR IF AFRICAN AMERICAN >60 >60 >60   EGFR IF NON- >60 >60 >60   CALCIUM 7.9 L 8.4 L 8.5 L   MAGNESIUM 1.5 L  --   --        CARDIAC BIOMARKERS:    Recent Labs  Lab 06/01/16 1830 04/28/17 1940 04/29/17  0045   CPK 51  --   --    TROPONIN I  --  0.044 H 0.047 H       COAGS:    Recent Labs  Lab 06/01/16 1830 04/28/17  1940   INR 1.0 1.0       LIPIDS/LFTS:    Recent Labs  Lab 09/30/15  1036 06/01/16  1830 04/28/17  1940 04/30/17  0456   CHOLESTEROL 176  --   --  101 L   TRIGLYCERIDES 79  --   --  60   HDL 79 H  --   --  42   LDL CHOLESTEROL 81.2  --   --  47.0 L   NON-HDL CHOLESTEROL 97  --   --  59   AST 23 21 17  --    ALT 12 11 7 L  --            Diagnostic Results:  ECG (personally reviewed tracings):   4/28/17 1917 , LAD/LVH, PRWP, TWI antlat/inf ?isch.  TWI new vs 6/2/16    Chest X-Ray (personally reviewed image(s)): 4/28/17  Increased ill-defined bilateral lower lung zone interstitial opacities, suspicious for edema or infectious/inflammatory disease.    CHINTAN 5/1/17 (images pers rev)    1 - Normal left ventricular systolic function (EF 60-65%).     2 - Moderate aortic stenosis, PETE = 1.3 cm2 by planimetry.     3 - Mild aortic regurgitation.     4 - Mild mitral regurgitation.     5 - Mild tricuspid regurgitation.     6 - Grade 2 atheroma disease of aorta.     7 - No evidence of RA/IVC/SVC mass (noted on TTE, ?prominent Chiari network vs intraesophageal).    Echo: 4/30/17 (images pers rev)    1 - Normal left ventricular systolic function (EF 55-60%).     2 - No wall  motion abnormalities.     3 - Concentric remodeling.     4 - Impaired LV relaxation, elevated LAP (grade 2 diastolic dysfunction).     5 - Mild aortic stenosis, PETE = 1.48 cm2, peak velocity = 2.31 m/s, mean gradient = 11.69 mmHg.     6 - Trivial mitral regurgitation.     7 - Trivial tricuspid regurgitation.     8 - Pulmonary hypertension. The estimated PA systolic pressure is 50 mmHg.     9 - Atrial septal aneurysm .     10 - Possible 1.5x1.1 cm mass noted in RA/IVC.  Consider CHINTAN for further evaluation.       Assessment and Plan:     * Bacterial pneumonia  Per IM  On abx    Elevated troponin  Asymptomatic  Ischemic EKG changes noted vs prior tracing  Echo 4/30/17 with normal LV fxn and no WMA  Cannot exclude demand  Will plan eventual MPI (as outpatient)    Abnormal echocardiogram findings without diagnosis  CHINTAN 5/1/17 without evidence of intracardiac mass.  ?prominent Chiari network vs intraesophageal.  No further imaging planned.      Nonrheumatic aortic valve stenosis  Mild AS on Echo 4/30/17  Med rx planned given absence of sxs    Hypertension, benign  Controlled    Hyperlipidemia  On statin rx  LDL acceptable    Mild cognitive impairment  As above      VTE Risk Mitigation         Ordered     Medium Risk of VTE  Once      04/29/17 0141     Place EVERETTE hose  Until discontinued      04/29/17 0141     Place sequential compression device  Until discontinued      04/29/17 0141        Cardiology will sign off, pls call with questions.  Pt to follow up with me as outpatient for stress testing (vs. discussion of med rx given her age, comorbidities, dementia, etc).    Robert Ramos MD  Cardiology  [facility]

## 2017-05-02 NOTE — ASSESSMENT & PLAN NOTE
Asymptomatic  Ischemic EKG changes noted vs prior tracing  Echo 4/30/17 with normal LV fxn and no WMA  Cannot exclude demand  Will plan eventual MPI (as outpatient)

## 2017-05-02 NOTE — PLAN OF CARE
Problem: Patient Care Overview  Goal: Plan of Care Review  Outcome: Ongoing (interventions implemented as appropriate)    05/02/17 8439   Coping/Psychosocial   Plan Of Care Reviewed With patient   Improved cognitive ability.Abx per iv continues.Fall risk reviewed. Fall precautions continues. Vital signs stable. Up in chair at bedside most of shift. No complains of pain. Extra fluid encouraged

## 2017-05-02 NOTE — ASSESSMENT & PLAN NOTE
Likely cardiac strain associated with increased metabolic demand with acute infection and sepsis. Is asymptomatic. Echo with normal LV function and no wall motion abnormalities. Did show an intracardiac mass. Unknown cause or etiology. Currently not causing any symptoms. To be re-evaluated as outpatient. MPI as outpatinent. Appreciate cardiology recs.

## 2017-05-02 NOTE — PLAN OF CARE
05/02/17 1509   Discharge Reassessment   Assessment Type Discharge Planning Reassessment   Can the patient answer the patient profile reliably? No, cognitively impaired   Discharge Plan A Skilled Nursing Facility   Discharge Plan B New Nursing Home placement - intermediate care facility   Change in patient condition or support system No   Patient choice form signed by patient/caregiver N/A   Explained to the the patient/caregiver why the discharge planned changed: Yes   Involved the patient/caregiver in establishing a new discharge plan: Yes     JOSE ENRIQUE spoke with pt daughter Nona @ 303 to discuss pt discharge plans. Per Nona pt was staying at the [facility] with very little help. Per Nona before pt came into the hospital pt was independent. JOSE ENRIQUE informed Nona PT/OT has recommended HH. Nona was concerned and stated she was informed her mother would need skilled nursing. JOSE ENRIQUE explained to Nona therapy works with pt and provide recommendations.  JOSE ENRIQUE informed Nona there are other options if her mother does not qualify for SNF and one being intermediate placement. JOSE ENRIQUE explained the benefits of intermediate and per Nona that is an option. However, Nona informed JOSE ENRIQUE there maybe problems trying to place pt due to finical problems her parents are having at the time. Per Nona pt assets have been frozen by IRS to recent history in the past and she is unaware if they will be able to pay for intermediate placement. JOSE ENRIQUE explained pt may qualify for Medicaid but she would need to go to the NH and fill out information. Per Nona she would really like if her mother would go SNF. JOSE ENRIQUE informed Nona SW will try but is unsure if pt will be approved by insurance.     Locet completed.  PASSR faxed to [employer] at:  [phone].   Awaiting 142. JOSE ENRIQUE had trouble completing LOCET due to pt SSN being incorrect in the system. Pt correct SSN is . Information obtained from [employer]  through other documents.     JOSE ENRIQUE  faxed pt face sheet, lab, MAR, PT/OT notes, and progress notes sent via [facility] to [facility], and [facility]. Awaiting to determine if services will be provided.

## 2017-05-02 NOTE — ASSESSMENT & PLAN NOTE
Chronic alcohol use. Monitor for DTs as benzos may cause AMS as well. No signs of this so far. Thiamine, folic acid and MVI

## 2017-05-02 NOTE — PLAN OF CARE
Problem: Physical Therapy Goal  Goal: Physical Therapy Goal  Goals to be met by: 17     Patient will increase functional independence with mobility by performin. Supine to sit with Supervision  2. Rolling to Left and Right with Supervision  3. Sit to stand transfer with Supervision using RW  4. Bed to chair transfer with Supervision using Rolling Walker  5. Gait x 150 feet with Supervision using Rolling Walker   6. Lower extremity exercise program x 20 reps per handout, with supervision   Outcome: Ongoing (interventions implemented as appropriate)  Continue with POC. Pt will benefit from further skilled therapy in order to get back to [facility]

## 2017-05-02 NOTE — PT/OT/SLP PROGRESS
Physical Therapy  Treatment    Sharona Acosta   MRN: [id number]   Admitting Diagnosis: Bacterial pneumonia    PT Received On: 17  PT Start Time: 1034     PT Stop Time: 1100    PT Total Time (min): 26 min       Billable Minutes:  Therapeutic Activity 14 and Therapeutic Exercise 12    Treatment Type: Treatment  PT/PTA: PTA     PTA Visit Number: 3       General Precautions: Standard, fall, contact  Orthopedic Precautions: Full weight bearing   Braces: N/A         Subjective:  Communicated with nurse Waite prior to session.  Pt agreeable to therapy .    Pain Ratin/10              Pain Rating Post-Intervention: 0/10    Objective:   Patient found with: telemetry, peripheral IV, bed alarm    Functional Mobility:  Bed Mobility:   Rolling/Turning to Left: Contact guard assistance  Scooting/Bridging: Minimum Assistance  Supine to Sit: Minimum Assistance    Transfers: x 3 trials from bed and 1 trial from bedside chair. VC's for safety technique.  Sit <> Stand Assistance: Minimum Assistance  Sit <> Stand Assistive Device: Rolling Walker, No Assistive Device  Bed <> Chair Technique: Stand Pivot  Bed <> Chair Assistance: Minimum Assistance  Bed <> Chair Assistive Device: No Assistive Device    Gait:   Gait Distance: ~ 4-5 steps from bed to bedside chair. VC's for safety and sequence. Limited with gait training today 2* fatigue.   Assistance 1: Minimum assistance, Moderate assistance  Gait Assistive Device: No device  Gait Pattern: 3-point gait  Gait Deviation(s): decreased eliza, decreased velocity of limb motion, decreased step length, increased time in double stance, decreased swing-to-stance ratio, decreased toe-to-floor clearance, decreased weight-shifting ability, backward lean    Balance:   Static Sit: FAIR: Maintains without assist, but unable to take any challenges   Dynamic Sit: FAIR: Cannot move trunk without losing balance  Static Stand: POOR+: Needs MINIMAL assist to maintain  Dynamic stand: POOR: N/A      Therapeutic Activities and Exercises:  Pt performed bed mobility, transfer and gait training as above.   Pt tolerated standing balance using RW min A to get clean up and doff/don diaper.  Pt performed seated BLE x 15 reps : heel/toes raises,LAQ, HS, hip flexion and pillow squeezes. V/T cues for proper technique and sequence, pt tolerated well.       AM-PAC 6 CLICK MOBILITY  How much help from another person does this patient currently need?   1 = Unable, Total/Dependent Assistance  2 = A lot, Maximum/Moderate Assistance  3 = A little, Minimum/Contact Guard/Supervision  4 = None, Modified [place]/Independent    Turning over in bed (including adjusting bedclothes, sheets and blankets)?: 4  Sitting down on and standing up from a chair with arms (e.g., wheelchair, bedside commode, etc.): 2  Moving from lying on back to sitting on the side of the bed?: 3  Moving to and from a bed to a chair (including a wheelchair)?: 2  Need to walk in hospital room?: 2  Climbing 3-5 steps with a railing?: 2  Total Score: 15    AM-PAC Raw Score CMS G-Code Modifier Level of Impairment Assistance   6 % Total / Unable   7 - 9 CM 80 - 100% Maximal Assist   10 - 14 CL 60 - 80% Moderate Assist   15 - 19 CK 40 - 60% Moderate Assist   20 - 22 CJ 20 - 40% Minimal Assist   23 CI 1-20% SBA / CGA   24 CH 0% Independent/ Mod I     Patient left up in reclined chair with all lines intact, call button in reach and nurse Mariann notified.    Assessment:  Sharona Acosta is a 81 y.o. female with a medical diagnosis of Bacterial pneumonia and presents with weakness, decreased endurance and functional mobility. Pt will benefit from further skilled therapy in order to get back to [facility] .    Rehab identified problem list/impairments: Rehab identified problem list/impairments: weakness, impaired endurance, gait instability, impaired cognition, impaired functional mobilty, impaired balance, decreased coordination, decreased lower extremity  function, decreased safety awareness    Rehab potential is good.    Activity tolerance: Fair    Discharge recommendations:   Pt will benefit from further skilled PT .      Barriers to discharge:   Barriers to Discharge: Other (Comment) (decreased safety awareness)    Equipment recommendations:   bedside commode.     GOALS:   Physical Therapy Goals        Problem: Physical Therapy Goal    Goal Priority Disciplines Outcome Goal Variances Interventions   Physical Therapy Goal     PT/OT, PT Ongoing (interventions implemented as appropriate)     Description:  Goals to be met by: 17     Patient will increase functional independence with mobility by performin. Supine to sit with Supervision  2. Rolling to Left and Right with Supervision  3. Sit to stand transfer with Supervision using RW  4. Bed to chair transfer with Supervision using Rolling Walker  5. Gait  x 150 feet with Supervision using Rolling Walker   6. Lower extremity exercise program x 20 reps per handout, with supervision                PLAN:    Patient to be seen daily  to address the above listed problems via gait training, therapeutic activities, therapeutic exercises  Plan of Care expires: 17  Plan of Care reviewed with: patient         Dahiana Reyes, PTA  2017

## 2017-05-02 NOTE — ASSESSMENT & PLAN NOTE
Goal while inpatient is a systolic blood pressure less than 160mmHg. BP currently at goal. Continue verapamil

## 2017-05-02 NOTE — PLAN OF CARE
Problem: Patient Care Overview  Goal: Plan of Care Review  Outcome: Ongoing (interventions implemented as appropriate)    05/02/17 0336   Coping/Psychosocial   Plan Of Care Reviewed With patient   Pt alert but has to be orientated to time, place, and situation. Pt on continuous fluids. Aseptic technique.Q2H weight shift. Remains on contact isolation. Free from falls. Bed alarm present.

## 2017-05-02 NOTE — ASSESSMENT & PLAN NOTE
CHINTAN 5/1/17 without evidence of intracardiac mass.  ?prominent Chiari network vs intraesophageal.  No further imaging planned.

## 2017-05-03 ENCOUNTER — OUTPATIENT CASE MANAGEMENT (OUTPATIENT)
Dept: ADMINISTRATIVE | Facility: OTHER | Age: 82
End: 2017-05-03

## 2017-05-03 LAB
BACTERIA BLD CULT: NORMAL
BACTERIA BLD CULT: NORMAL

## 2017-05-03 PROCEDURE — 25000003 PHARM REV CODE 250: Performed by: EMERGENCY MEDICINE

## 2017-05-03 PROCEDURE — 97110 THERAPEUTIC EXERCISES: CPT

## 2017-05-03 PROCEDURE — 12000002 HC ACUTE/MED SURGE SEMI-PRIVATE ROOM

## 2017-05-03 PROCEDURE — 94799 UNLISTED PULMONARY SVC/PX: CPT

## 2017-05-03 PROCEDURE — 25000003 PHARM REV CODE 250: Performed by: HOSPITALIST

## 2017-05-03 PROCEDURE — 63600175 PHARM REV CODE 636 W HCPCS: Performed by: INTERNAL MEDICINE

## 2017-05-03 PROCEDURE — 25000003 PHARM REV CODE 250: Performed by: INTERNAL MEDICINE

## 2017-05-03 PROCEDURE — 99900035 HC TECH TIME PER 15 MIN (STAT)

## 2017-05-03 PROCEDURE — 97535 SELF CARE MNGMENT TRAINING: CPT

## 2017-05-03 PROCEDURE — 97116 GAIT TRAINING THERAPY: CPT

## 2017-05-03 PROCEDURE — 94761 N-INVAS EAR/PLS OXIMETRY MLT: CPT

## 2017-05-03 RX ADMIN — ASPIRIN 81 MG CHEWABLE TABLET 81 MG: 81 TABLET CHEWABLE at 08:05

## 2017-05-03 RX ADMIN — THIAMINE HCL TAB 100 MG 100 MG: 100 TAB at 08:05

## 2017-05-03 RX ADMIN — FOLIC ACID 1 MG: 1 TABLET ORAL at 08:05

## 2017-05-03 RX ADMIN — METRONIDAZOLE 500 MG: 500 TABLET ORAL at 09:05

## 2017-05-03 RX ADMIN — THERA TABS 1 TABLET: TAB at 08:05

## 2017-05-03 RX ADMIN — ENOXAPARIN SODIUM 40 MG: 100 INJECTION SUBCUTANEOUS at 05:05

## 2017-05-03 RX ADMIN — LEVOTHYROXINE SODIUM 75 MCG: 75 TABLET ORAL at 08:05

## 2017-05-03 RX ADMIN — MOXIFLOXACIN HYDROCHLORIDE 400 MG: 400 TABLET, FILM COATED ORAL at 08:05

## 2017-05-03 RX ADMIN — SIMVASTATIN 40 MG: 40 TABLET, FILM COATED ORAL at 09:05

## 2017-05-03 RX ADMIN — METRONIDAZOLE 500 MG: 500 TABLET ORAL at 01:05

## 2017-05-03 RX ADMIN — VERAPAMIL HYDROCHLORIDE 180 MG: 180 TABLET, FILM COATED, EXTENDED RELEASE ORAL at 08:05

## 2017-05-03 RX ADMIN — METRONIDAZOLE 500 MG: 500 TABLET ORAL at 05:05

## 2017-05-03 NOTE — PT/OT/SLP PROGRESS
Occupational Therapy  Treatment    Sharona Acosta   MRN: [id number]   Admitting Diagnosis: Bacterial pneumonia    OT Date of Treatment: 17   OT Start Time: 1025  OT Stop Time: 1050  OT Total Time (min): 25 min    Billable Minutes:  Self Care/Home Management 25    General Precautions: Standard, contact, fall  Orthopedic Precautions: N/A  Braces: N/A         Subjective:  Communicated with nurse Brown prior to session.    Pain Ratin/10      Pain Rating Post-Intervention: 0/10    Objective:  Patient found with: telemetry, peripheral IV     Functional Mobility:  Bed Mobility:  Rolling/Turning to Left: Supervision  Supine to Sit: Supervision    Transfers:   Sit <> Stand Assistance: Minimum Assistance  Sit <> Stand Assistive Device: Rolling Walker  Bed <> Chair Technique: Stand Pivot  Bed <> Chair Transfer Assistance: Minimum Assistance    Functional Ambulation: Pt able to ambulate to chair with min assist    Activities of Daily Living:  Feeding Level of Assistance: Stand by assistance    UE Dressing Level of Assistance: Minimum assistance  Grooming Position: Seated  Grooming Level of Assistance: Supervision (face washing and hair combing)   Balance:   Static Sit: GOOD-: Takes MODERATE challenges from all directions but inconsistently  Dynamic Sit: GOOD-: Maintains balance through MODERATE excursions of active trunk movement,     Static Stand: POOR+: Needs MINIMAL assist to maintain  Dynamic stand: FAIR: Needs CONTACT GUARD during gait      AM-PAC 6 CLICK ADL   How much help from another person does this patient currently need?   1 = Unable, Total/Dependent Assistance  2 = A lot, Maximum/Moderate Assistance  3 = A little, Minimum/Contact Guard/Supervision  4 = None, Modified [place]/Independent    Putting on and taking off regular lower body clothing? : 1  Bathing (including washing, rinsing, drying)?: 1  Toileting, which includes using toilet, bedpan, or urinal? : 1  Putting on and taking off regular upper  body clothing?: 3  Taking care of personal grooming such as brushing teeth?: 3  Eating meals?: 3  Total Score: 12     AM-PAC Raw Score CMS G-Code Modifier Level of Impairment Assistance   6 % Total / Unable   7 - 9 CM 80 - 100% Maximal Assist   10 - 14 CL 60 - 80% Moderate Assist   15 - 19 CK 40 - 60% Moderate Assist   20 - 22 CJ 20 - 40% Minimal Assist   23 CI 1-20% SBA / CGA   24 CH 0% Independent/ Mod I     Patient left up in chair with all lines intact, call button in reach and nurse notified    ASSESSMENT:  Sharona Acosta is a 81 y.o. female with a medical diagnosis of Bacterial pneumonia and presents with good mobility. Pt able to perform supine to sit with supervision. Pt states that she has no pain and is able to assist better today.    Rehab identified problem list/impairments: Rehab identified problem list/impairments: weakness, impaired endurance, impaired self care skills, impaired functional mobilty, gait instability, impaired balance, impaired cognition    Rehab potential is good.    Activity tolerance: Good    Discharge recommendations: Discharge Facility/Level Of Care Needs:  (Pt would benefit from continued therapy in another level of care)     Barriers to discharge: Barriers to Discharge: Other (Comment) (decreased safety awareness)    Equipment recommendations: bedside commode     GOALS:   Occupational Therapy Goals        Problem: Occupational Therapy Goal    Goal Priority Disciplines Outcome Interventions   Occupational Therapy Goal     OT, PT/OT Ongoing (interventions implemented as appropriate)    Description:  Goals to be met by: 5.10.17     Patient will increase functional independence with ADLs by performing:    Feeding with Set-up Assistance.  UE Dressing with Minimal Assistance.  Grooming while seated with Contact Guard Assistance.  Rolling to Right with Set-up Assistance.   Upper extremity exercise program x10 reps per handout, with assistance as needed.                 Plan:  Patient to be seen 3 x/week to address the above listed problems via self-care/home management, therapeutic activities, therapeutic exercises  Plan of Care expires: 05/12/17  Plan of Care reviewed with: patient    Susan Garg OT  05/03/2017

## 2017-05-03 NOTE — PLAN OF CARE
Problem: Patient Care Overview  Goal: Plan of Care Review  Outcome: Ongoing (interventions implemented as appropriate)    05/03/17 1630   Coping/Psychosocial   Plan Of Care Reviewed With patient;daughter  (Adali Kaur)   Patient remains a high fall risk.  No falls during visit.  Educated about factors that make her a high fall risk.  Will continue to turn and assist patient with repositioning every two hours.  Antibiotic tx ongoing with po medication.  Daughter and patient both verbalizes understanding.

## 2017-05-03 NOTE — PROGRESS NOTES
JOSE ENRIQUE received a call from [facility] informing JOSE ENRIQUE they are looking to accept pt. JOSE ENRIQUE contacted pt daughter Nona to discuss discharge planning and inform [facility] is interested once pt is C-diff negative. Nona informed JOSE ENRIQUE she would like for someone with [facility] to contact her. JOSE ENRIQUE contacted Sue and provided Sue with Nona contact information.     JOSE ENRIQUE spoke with Nona about a plan B for pt discharge if pt is not accepted into SNF neither NH. Per Nona she does not have a plan B at this time. JOSE ENRIQUE asked Nona if she is able to speak with family members by the end of the day and contact JOSE ENRIQUE. Per Nona they will not have a decision today because her sister gets off late. Per Nona they may have a decision by tomorrow morning. JOSE ENRIQUE informed Nona it is important to have a plan as soon as possible as pt is medically stable and ready for discharge if she is not accepted for SNF or assisted placement. JOSE ENRIQUE will continue to discuss options with Nona.     3:00PM- JOSE ENRIQUE received a call from pt daughter Adali to discuss discharge planning. JOSE ENRIQUE explained pt discharge plans. JOSE ENRIQUE informed Adali QUISPE has submitted pt information for SNF, but is uncertain if insurance will cover. JOSE ENRIQUE explained assisted placement is an option, but was informed by family there are some difficulties with the pt finances such as the IRS having a hold on most of pt assets. JOSE ENRIQUE informed Adali QUISPE is working placement but is unsure of the outcome. Filippo Bro there is no other plan for her mother as she and her sister works. JOSE ENRIQUE informed Adali they should consider sitters, and per Adali they are unable to afford sitters and have no other options for her mothers care. Per Adali she can not understand why therapy is not recommending SNF and JOSE ENRIQUE directed Adali to nursing. JOSE ENRIQUE did, however, inform JOSE ENRIQUE [facility] shown interest and JOSE ENRIQUE provided Sue with Nona's contact information. JOSE ENRIQUE informed Adali she should follow-up with Nona to  determine if she has spoken with Sue. JOSE ENRIQUE did explain to Adali, even though [facility] is showing interest, it is not guaranteed the insurance will improve SNF.

## 2017-05-03 NOTE — PROGRESS NOTES
For your Information:    Please note the following patient has been assigned to Sharonda Vo RN with Outpatient Complex Care Mgmt for screening.    Reason: High Risk  Sepsis, due to unspecified organism  Leukocytosis, unspecified type  Fever, unspecified fever cause  Elevated troponin  Hypokalemia  Sepsis  Elevated troponin I level  Nonrheumatic aortic valve stenosis  Abnormal echocardiogram  Bacterial pneumonia  Hypertension, benign  H/O: stroke  Mild cognitive impairment  Alcohol use  Neutrophilic leukocytosis  HCAP (healthcare-associated pneumonia)  Encephalopathy, metabolic  Abnormal echocardiogram findings without diagnosis  History of vertebral fracture  Vitamin D deficiency  Atherosclerosis of aorta    Please contact \[facility]\"" at [phone] with questions.    Thank you,  Gabriella Sweeney, SSC

## 2017-05-03 NOTE — PLAN OF CARE
Problem: Patient Care Overview  Goal: Plan of Care Review  Outcome: Ongoing (interventions implemented as appropriate)    05/03/17 0133   Coping/Psychosocial   Plan Of Care Reviewed With patient   Pt has to be re orientated to time and place. Pt on contact isolation. Pt remains free from falls, injury, and trama. Medications given. Aseptic tech continued. Q2 hour weight shift provided. No acute distress noted. Will continue to monitor.

## 2017-05-03 NOTE — PHYSICIAN QUERY
PT Name: Sharona Acosta  MR #: [id number]     Physician Query Form - Alcohol Clarification      CDS/: Adali Zeng RN, CCDS               Contact information: [phone]    This form is a permanent document in the medical record.     Query Date: May 3, 2017    By submitting this query, we are merely seeking further clarification of documentation.  Please utilize your independent clinical judgment when addressing the question(s) below.     The medical record contains the following:    Findings Supporting Clinical Information Location in Medical Record   Alcohol Abuse  -- per ED MD; H&P; Cards PN 4/30 & CN 4/30; Prim PN 4/30-5/1    Chronic Alcohol Use  - Per Prim PN 4/29-5/1 81 wo woman with alcohol abuse    Alcohol use  Chronic alcohol use. Monitor for DTs as benzos may cause AMS as well. No signs of this so far. Thiamine, folic acid and MVI    Primary PN 5/2        Please clarify/document the applicable diagnosis for the above clinical findings:     [x ] Alcohol Use   [ ] Alcoholism   [ ] Alcohol Abuse   [ ] Alcohol Dependence   [ ] Clinically Undetermined   [ ] Other/Clarification of findings: _________________           Please document in your progress notes daily for the duration of treatment, until resolved, and include in your discharge summary.

## 2017-05-03 NOTE — PT/OT/SLP PROGRESS
Physical Therapy  Treatment    Sharona Acosta   MRN: [id number]   Admitting Diagnosis: Bacterial pneumonia    PT Received On: 17  PT Start Time: 1054     PT Stop Time: 1120    PT Total Time (min): 26 min       Billable Minutes:  Gait [id number] and Therapeutic Exercise 11    Treatment Type: Treatment  PT/PTA: PTA     PTA Visit Number: 4       General Precautions: Standard, fall, contact  Orthopedic Precautions: N/A   Braces: N/A         Subjective:  Communicated with nurse Waite prior to session.  Pt agreeable to therapy.    Pain Ratin/10              Pain Rating Post-Intervention: 0/10    Objective:   Patient found with: telemetry    Functional Mobility:  Bed Mobility:    pt found up in bedside chair with OT.     Transfers: VC's for safety technique and walker management.   Sit <> Stand Assistance: Minimum Assistance  Sit <> Stand Assistive Device: Rolling Walker    Gait:   Gait Distance: ~ 100 ft with bedside chair followed. Pt required Min / Mod A for walker management. VC's for safety technique and sequence.   Assistance 1: Minimum assistance  Gait Assistive Device: Rolling walker  Gait Pattern: swing-to gait  Gait Deviation(s): decreased eliza, decreased velocity of limb motion, decreased step length, decreased toe-to-floor clearance, decreased weight-shifting ability, forward lean      Balance:   Static Sit: FAIR+: Able to take MINIMAL challenges from all directions  Dynamic Sit: FAIR+: Maintains balance through MINIMAL excursions of active trunk motion  Static Stand: FAIR: Maintains without assist but unable to take challenges  Dynamic stand: POOR: N/A     Therapeutic Activities and Exercises:  Pt performed transfer and gait training as above.   Pt performed seated BLE x 15 reps : GS, AP, HS ,hip flexion and hip ABD/ADD. VC's for proper technique and sequence. Pt tolerated well.      AM-PAC 6 CLICK MOBILITY  How much help from another person does this patient currently need?   1 = Unable,  Total/Dependent Assistance  2 = A lot, Maximum/Moderate Assistance  3 = A little, Minimum/Contact Guard/Supervision  4 = None, Modified [place]/Independent    Turning over in bed (including adjusting bedclothes, sheets and blankets)?: 4  Sitting down on and standing up from a chair with arms (e.g., wheelchair, bedside commode, etc.): 3  Moving from lying on back to sitting on the side of the bed?: 3  Moving to and from a bed to a chair (including a wheelchair)?: 3  Need to walk in hospital room?: 3  Climbing 3-5 steps with a railing?: 3  Total Score: 19    AM-PAC Raw Score CMS G-Code Modifier Level of Impairment Assistance   6 % Total / Unable   7 - 9 CM 80 - 100% Maximal Assist   10 - 14 CL 60 - 80% Moderate Assist   15 - 19 CK 40 - 60% Moderate Assist   20 - 22 CJ 20 - 40% Minimal Assist   23 CI 1-20% SBA / CGA   24 CH 0% Independent/ Mod I     Patient left up in reclined chair with all lines intact, call button in reach and nurse Mariann notified.    Assessment:  Sharona Acosta is a 81 y.o. female with a medical diagnosis of Bacterial pneumonia and presents with weakness, decreased functional mobility. Pt will benefit from further skilled therapy in order to get back to [facility].    Rehab identified problem list/impairments: Rehab identified problem list/impairments: weakness, impaired endurance, impaired cognition, decreased lower extremity function, decreased upper extremity function, decreased safety awareness, decreased coordination    Rehab potential is good.    Activity tolerance: Good    Discharge recommendations:   Pt will benefit from further skilled PT .     Barriers to discharge: Barriers to Discharge: Decreased caregiver support, decreased safety awareness.     Equipment recommendations:   bedside commode.     GOALS:   Physical Therapy Goals        Problem: Physical Therapy Goal    Goal Priority Disciplines Outcome Goal Variances Interventions   Physical Therapy Goal     PT/OT, PT Ongoing  (interventions implemented as appropriate)     Description:  Goals to be met by: 17     Patient will increase functional independence with mobility by performin. Supine to sit with Supervision  2. Rolling to Left and Right with Supervision  3. Sit to stand transfer with Supervision using RW  4. Bed to chair transfer with Supervision using Rolling Walker  5. Gait  x 150 feet with Supervision using Rolling Walker   6. Lower extremity exercise program x 20 reps per handout, with supervision                PLAN:    Patient to be seen daily  to address the above listed problems via gait training, therapeutic activities, therapeutic exercises  Plan of Care expires: 17  Plan of Care reviewed with: patient         Dahiana Reyes, PTA  2017

## 2017-05-03 NOTE — CONSULTS
Locet completed.  PASSR faxed to [employer] at:  [phone].   Awaiting 142. SW had trouble completing LOCET due to pt SSN being incorrect in the system. Pt correct SSN is . Information obtained from Bette in  through other documents.     JOSE ENRIQUE faxed pt face sheet, lab, MAR, PT/OT notes, and progress notes sent via [facility] to [facility], and [facility]. Awaiting to determine if services will be provided.

## 2017-05-04 PROBLEM — D72.9 NEUTROPHILIC LEUKOCYTOSIS: Status: RESOLVED | Noted: 2017-04-29 | Resolved: 2017-05-04

## 2017-05-04 PROBLEM — E87.6 HYPOKALEMIA: Status: RESOLVED | Noted: 2017-04-29 | Resolved: 2017-05-04

## 2017-05-04 LAB
ANION GAP SERPL CALC-SCNC: 7 MMOL/L
BASOPHILS # BLD AUTO: 0.05 K/UL
BASOPHILS NFR BLD: 0.4 %
BUN SERPL-MCNC: 10 MG/DL
CALCIUM SERPL-MCNC: 8 MG/DL
CHLORIDE SERPL-SCNC: 108 MMOL/L
CO2 SERPL-SCNC: 21 MMOL/L
CREAT SERPL-MCNC: 0.7 MG/DL
DIFFERENTIAL METHOD: ABNORMAL
EOSINOPHIL # BLD AUTO: 0.5 K/UL
EOSINOPHIL NFR BLD: 4.5 %
ERYTHROCYTE [DISTWIDTH] IN BLOOD BY AUTOMATED COUNT: 16.6 %
EST. GFR  (AFRICAN AMERICAN): >60 ML/MIN/1.73 M^2
EST. GFR  (NON AFRICAN AMERICAN): >60 ML/MIN/1.73 M^2
GLUCOSE SERPL-MCNC: 93 MG/DL
HCT VFR BLD AUTO: 33.2 %
HGB BLD-MCNC: 10.6 G/DL
LYMPHOCYTES # BLD AUTO: 4.3 K/UL
LYMPHOCYTES NFR BLD: 38.2 %
MAGNESIUM SERPL-MCNC: 1.7 MG/DL
MCH RBC QN AUTO: 29 PG
MCHC RBC AUTO-ENTMCNC: 31.9 %
MCV RBC AUTO: 91 FL
MONOCYTES # BLD AUTO: 1.3 K/UL
MONOCYTES NFR BLD: 11.7 %
NEUTROPHILS # BLD AUTO: 5 K/UL
NEUTROPHILS NFR BLD: 43.9 %
PLATELET # BLD AUTO: 355 K/UL
PMV BLD AUTO: 10.7 FL
POCT GLUCOSE: 103 MG/DL (ref 70–110)
POCT GLUCOSE: 115 MG/DL (ref 70–110)
POCT GLUCOSE: 94 MG/DL (ref 70–110)
POCT GLUCOSE: 94 MG/DL (ref 70–110)
POTASSIUM SERPL-SCNC: 3.6 MMOL/L
RBC # BLD AUTO: 3.66 M/UL
SODIUM SERPL-SCNC: 136 MMOL/L
WBC # BLD AUTO: 11.33 K/UL

## 2017-05-04 PROCEDURE — 25000003 PHARM REV CODE 250: Performed by: EMERGENCY MEDICINE

## 2017-05-04 PROCEDURE — 36415 COLL VENOUS BLD VENIPUNCTURE: CPT

## 2017-05-04 PROCEDURE — 12000002 HC ACUTE/MED SURGE SEMI-PRIVATE ROOM

## 2017-05-04 PROCEDURE — 85025 COMPLETE CBC W/AUTO DIFF WBC: CPT

## 2017-05-04 PROCEDURE — 80048 BASIC METABOLIC PNL TOTAL CA: CPT

## 2017-05-04 PROCEDURE — 63600175 PHARM REV CODE 636 W HCPCS: Performed by: INTERNAL MEDICINE

## 2017-05-04 PROCEDURE — 83735 ASSAY OF MAGNESIUM: CPT

## 2017-05-04 PROCEDURE — 25000003 PHARM REV CODE 250: Performed by: INTERNAL MEDICINE

## 2017-05-04 PROCEDURE — 97116 GAIT TRAINING THERAPY: CPT

## 2017-05-04 PROCEDURE — 97530 THERAPEUTIC ACTIVITIES: CPT

## 2017-05-04 PROCEDURE — 25000003 PHARM REV CODE 250: Performed by: HOSPITALIST

## 2017-05-04 RX ADMIN — SIMVASTATIN 40 MG: 40 TABLET, FILM COATED ORAL at 09:05

## 2017-05-04 RX ADMIN — THERA TABS 1 TABLET: TAB at 08:05

## 2017-05-04 RX ADMIN — LEVOTHYROXINE SODIUM 75 MCG: 75 TABLET ORAL at 08:05

## 2017-05-04 RX ADMIN — METRONIDAZOLE 500 MG: 500 TABLET ORAL at 01:05

## 2017-05-04 RX ADMIN — ASPIRIN 81 MG CHEWABLE TABLET 81 MG: 81 TABLET CHEWABLE at 08:05

## 2017-05-04 RX ADMIN — METRONIDAZOLE 500 MG: 500 TABLET ORAL at 05:05

## 2017-05-04 RX ADMIN — METRONIDAZOLE 500 MG: 500 TABLET ORAL at 09:05

## 2017-05-04 RX ADMIN — ENOXAPARIN SODIUM 40 MG: 100 INJECTION SUBCUTANEOUS at 04:05

## 2017-05-04 RX ADMIN — VERAPAMIL HYDROCHLORIDE 180 MG: 180 TABLET, FILM COATED, EXTENDED RELEASE ORAL at 08:05

## 2017-05-04 RX ADMIN — MOXIFLOXACIN HYDROCHLORIDE 400 MG: 400 TABLET, FILM COATED ORAL at 08:05

## 2017-05-04 RX ADMIN — THIAMINE HCL TAB 100 MG 100 MG: 100 TAB at 08:05

## 2017-05-04 RX ADMIN — FOLIC ACID 1 MG: 1 TABLET ORAL at 08:05

## 2017-05-04 NOTE — PROGRESS NOTES
[facility]  Progress Note    Patient Name: Sharona Acosta  MRN: [id number]  Patient Class: IP- Inpatient   Admission Date: 4/28/2017  Length of Stay: 5 days  Attending Physician: Maria T Hester MD  Primary Care Provider: Vianey Lee MD        Subjective:     Principal Problem:Bacterial pneumonia    HPI:  Sharona Acosta is a 81 wo woman with alcohol abuse, arthritis, atherosclerosis of the aorta, h/o compression fracture of spine, h/o stroke (1997), hypothyroidism, HLD, essential HTN, osteopenia who presented with her daughter who reports the patient has been experiencing 3 days of progressive weakness and confusion.  She normally ambulates independently but she has not been doing so with her walker over the last 3 days. She had a fall and was taken to [facility] where she was treated for a urinary tract infection w/ IV antibiotics and released 3 days ago. No oral antibiotic continued as an outpatient.  Patient currently resides in an independent living facility and there is concern that she is unable to take care of herself at home at this time.  Report of subjective fever.  The history is otherwise limited due to the condition of the patient.     In the emergency department routine laboratory studies and chest x-ray were obtained.  There is evidence of ill-defined bilateral lower interstitial opacities concerning for healthcare associated pneumonia.  She also had hypokalemia.  Her mental status did not improve while in the emergency department.  She is also noted to have a slightlyelevated troponin level.     Hospital medicine has been asked to admit for further evaluation and treatment.         Hospital Course:  She was found to have HCAP likely bacterial with sepsis based on CXR, leukocytosis, Tmax 100.9, and tachycardia. Empiric abx with vanc and cefepime were started. Blood cultures were collected. She had quick symptomatic improvement. Abx were  deescalated to moxifloxacin on 4/30.  She had a slightly elevated troponin. Cardiology consulted. Echo showed LVEF 55%, +DD, mild-mod AS, and Possible 1.5x1.1 cm mass noted in RA/IVC. CHINTAN on 5/1 to further investigate showed no evidence of RA/IVC/SVC mass (noted on TTE, ?prominent Chiari network vs intraesophageal) and grade 2 atheroma disease of aorta.   PT/OT recommended home health PT/OT on discharge. Previously I had discussed SNF placement with her daughter. We will see if this is still what they want and consult case management.      Review of Systems   Respiratory: Negative.    Cardiovascular: Negative.    Gastrointestinal: Positive for diarrhea.     Objective:     Vital Signs (Most Recent):  Temp: 98.5 °F (36.9 °C) (05/03/17 1636)  Pulse: 74 (05/03/17 1636)  Resp: 17 (05/03/17 1636)  BP: 126/60 (05/03/17 1636)  SpO2: 95 % (05/03/17 1855) Vital Signs (24h Range):  Temp:  [97.4 °F (36.3 °C)-98.5 °F (36.9 °C)] 98.5 °F (36.9 °C)  Pulse:  [68-87] 74  Resp:  [16-20] 17  SpO2:  [94 %-96 %] 95 %  BP: (116-137)/(55-65) 126/60     Weight: 59 kg (130 lb)  Body mass index is 21.63 kg/(m^2).    Intake/Output Summary (Last 24 hours) at 05/03/17 1903  Last data filed at 05/03/17 1000   Gross per 24 hour   Intake              120 ml   Output                0 ml   Net              120 ml      Physical Exam   Constitutional: She appears well-developed and well-nourished. No distress.   Pleasant, frail-appearing   HENT:   Right Ear: External ear normal.   Left Ear: External ear normal.   Nose: Nose normal.   Mouth/Throat: Oropharynx is clear and moist.   Eyes: Conjunctivae and EOM are normal. Pupils are equal, round, and reactive to light. No scleral icterus.   Neck: Normal range of motion. Neck supple. No thyromegaly present.   Cardiovascular: Normal rate.  Exam reveals no friction rub.    Murmur heard.  Pulmonary/Chest: Effort normal and breath sounds normal. No respiratory distress. She has no wheezes. She has no rales.    Abdominal: Soft. Bowel sounds are normal. She exhibits no distension and no mass. There is no tenderness.   No hepatosplenomegaly   Musculoskeletal: Normal range of motion. She exhibits no edema or deformity.   Lymphadenopathy:     She has no cervical adenopathy.   Neurological: She is alert. No cranial nerve deficit.   Oriented to self and place. Follows simple commands and answers questions appropriately   Skin: Skin is warm and dry. No pallor.   Nursing note and vitals reviewed.      Significant Labs:   CBC:     Recent Labs  Lab 05/02/17 0253   WBC 15.28*   HGB 13.4   HCT 40.4        CMP:     Recent Labs  Lab 05/02/17 0253      K 4.0      CO2 21*   GLU 78   BUN 5*   CREATININE 0.6   CALCIUM 8.5*   ANIONGAP 11   EGFRNONAA >60     TTE as above    Assessment/Plan:      * Bacterial pneumonia  Chest x-ray notable for ill-defined bilateral lower lung interstitial opacities, concerning for pneumonia. Initially met sepsis criteria, now resolved.  D/C from [facility] recently. Chest x-ray indicates possible underlying history of pulmonary fibrosis. Changed moxi to PO. Currently stable at room air    Hypertension, benign  Goal while inpatient is a systolic blood pressure less than 160mmHg. BP currently at goal. Continue verapamil    Hyperlipidemia  Statin      Hypothyroid  Continued current home regimen. TFT with low T3 but otherwise normal.    H/O: stroke  Stable. Cont ASA and statin.    Mild cognitive impairment  Supportive care    Alcohol use  Chronic alcohol use. Monitor for DTs as benzos may cause AMS as well. No signs of this so far. Thiamine, folic acid and MVI    Hypokalemia  Replete and monitor      Neutrophilic leukocytosis  Secondary to healthcare associated pneumonia    Elevated troponin  Likely cardiac strain associated with increased metabolic demand with acute infection and sepsis. Is asymptomatic. Echo with normal LV function and no wall motion abnormalities. Did show an  intracardiac mass. Unknown cause or etiology. Currently not causing any symptoms. To be re-evaluated as outpatient. MPI as outpatinent. Appreciate cardiology recs.     HCAP (healthcare-associated pneumonia)  Vanc Cefepime deescalated to moxifloxacin 5/1. Improving. BCx NGTD.    Nonrheumatic aortic valve stenosis  Mild-mod    Abnormal echocardiogram findings without diagnosis  CHINTAN as above    Physical deconditioning  Patient at independent living facility prior to admission. It appears patient requires 24-hour assistance and skilled PT. No 24-hour assistance currently offered at either independent nor assisted living facility. No 24-hour assistance available at home. Consult for SNF placed      Clostridium difficile infection  Still with diarrhea but less. Continue flagyl.      VTE Risk Mitigation         Ordered     enoxaparin injection 40 mg  Daily     Route:  Subcutaneous        05/02/17 1735     Medium Risk of VTE  Once      04/29/17 0141     Place EVERETTE hose  Until discontinued      04/29/17 0141     Place sequential compression device  Until discontinued      04/29/17 0141        Pending placement    Maria T Kenney MD  Department of Hospital Medicine   [facility]

## 2017-05-04 NOTE — ASSESSMENT & PLAN NOTE
Chest x-ray notable for ill-defined bilateral lower lung interstitial opacities, concerning for pneumonia. Initially met sepsis criteria, now resolved.  D/C from [facility] recently. Chest x-ray indicates possible underlying history of pulmonary fibrosis. Changed moxi to PO. Currently stable at room air

## 2017-05-04 NOTE — ASSESSMENT & PLAN NOTE
Patient at independent living facility prior to admission. It appears patient requires 24-hour assistance and skilled PT. No 24-hour assistance currently offered at either independent nor assisted living facility. No 24-hour assistance available at home. Consulted for SNF placement

## 2017-05-04 NOTE — ASSESSMENT & PLAN NOTE
Chest x-ray notable for ill-defined bilateral lower lung interstitial opacities, concerning for pneumonia. Initially met sepsis criteria, now resolved.  D/C from [facility] recently. Chest x-ray indicates possible underlying history of pulmonary fibrosis. Changed moxi to PO. Currently stable at room air

## 2017-05-04 NOTE — PLAN OF CARE
Problem: Patient Care Overview  Goal: Plan of Care Review  Outcome: Ongoing (interventions implemented as appropriate)  Patient has been pleasantly confused. She thinks she is at a family member's home. Easily reoriented but patient forgets very quickly. Very weak, when tried to put patient on BSC she did not want to stand. PT worked with patient and said she ambulated. Sat up in chair for couple of hours today. Has been incontinent of urine and stool. Stool remains loose. Continues on IV antibiotics. Family at bedside this afternoon.

## 2017-05-04 NOTE — ASSESSMENT & PLAN NOTE
Patient at independent living facility prior to admission. It appears patient requires 24-hour assistance and skilled PT. No 24-hour assistance currently offered at either independent nor assisted living facility. No 24-hour assistance available at home. Consult for SNF placed

## 2017-05-04 NOTE — PROGRESS NOTES
[facility] [place], [place] Medicine  Progress Note    Patient Name: Sharona Acosta  MRN: [id number]  Patient Class: IP- Inpatient   Admission Date: 4/28/2017  Length of Stay: 6 days  Attending Physician: Maria T Hester MD  Primary Care Provider: Vianey Lee MD        Subjective:     Principal Problem:Bacterial pneumonia    HPI:  Sharona Acosta is a 81 wo woman with alcohol abuse, arthritis, atherosclerosis of the aorta, h/o compression fracture of spine, h/o stroke (1997), hypothyroidism, HLD, essential HTN, osteopenia who presented with her daughter who reports the patient has been experiencing 3 days of progressive weakness and confusion.  She normally ambulates independently but she has not been doing so with her walker over the last 3 days. She had a fall and was taken to [facility] where she was treated for a urinary tract infection w/ IV antibiotics and released 3 days ago. No oral antibiotic continued as an outpatient.  Patient currently resides in an independent living facility and there is concern that she is unable to take care of herself at home at this time.  Report of subjective fever.  The history is otherwise limited due to the condition of the patient.     In the emergency department routine laboratory studies and chest x-ray were obtained.  There is evidence of ill-defined bilateral lower interstitial opacities concerning for healthcare associated pneumonia.  She also had hypokalemia.  Her mental status did not improve while in the emergency department.  She is also noted to have a slightlyelevated troponin level.     Hospital medicine has been asked to admit for further evaluation and treatment.         Hospital Course:  She was found to have HCAP likely bacterial with sepsis based on CXR, leukocytosis, Tmax 100.9, and tachycardia. Empiric abx with vanc and cefepime were started. Blood cultures were collected. She had quick symptomatic improvement. Abx were  deescalated to moxifloxacin on 4/30.  She had a slightly elevated troponin. Cardiology consulted. Echo showed LVEF 55%, +DD, mild-mod AS, and Possible 1.5x1.1 cm mass noted in RA/IVC. CHINTAN on 5/1 to further investigate showed no evidence of RA/IVC/SVC mass (noted on TTE, ?prominent Chiari network vs intraesophageal) and grade 2 atheroma disease of aorta.   PT/OT recommended home health PT/OT on discharge, however patient requires 24 hour assistance that is not provided at her independent living facility, an assisted living facility nor at home unless a sitter would become available which is an out of pocket service. Family had expressed inability to afford this. Seeking SNF or NH (intermediate) as options.       Interval History: no acute events. BMs are less frequent and now with some formed stool    Review of Systems   Respiratory: Negative.    Cardiovascular: Negative.    Gastrointestinal: Positive for diarrhea.     Objective:     Vital Signs (Most Recent):  Temp: 97.4 °F (36.3 °C) (05/04/17 1130)  Pulse: 74 (05/04/17 1130)  Resp: 18 (05/04/17 1130)  BP: (!) 131/58 (05/04/17 1130)  SpO2: (!) 91 % (05/04/17 1130) Vital Signs (24h Range):  Temp:  [97.4 °F (36.3 °C)-98.5 °F (36.9 °C)] 97.4 °F (36.3 °C)  Pulse:  [73-78] 74  Resp:  [17-18] 18  SpO2:  [91 %-95 %] 91 %  BP: (112-147)/(54-67) 131/58     Weight: 59 kg (130 lb)  Body mass index is 21.63 kg/(m^2).    Intake/Output Summary (Last 24 hours) at 05/04/17 1207  Last data filed at 05/04/17 0900   Gross per 24 hour   Intake              240 ml   Output                0 ml   Net              240 ml      Physical Exam   Constitutional: She appears well-developed and well-nourished. No distress.   Pleasant, frail-appearing   HENT:   Right Ear: External ear normal.   Left Ear: External ear normal.   Nose: Nose normal.   Mouth/Throat: Oropharynx is clear and moist.   Eyes: Conjunctivae and EOM are normal. Pupils are equal, round, and reactive to light. No scleral icterus.    Neck: Normal range of motion. Neck supple. No thyromegaly present.   Cardiovascular: Normal rate.  Exam reveals no friction rub.    Murmur heard.  Pulmonary/Chest: Effort normal and breath sounds normal. No respiratory distress. She has no wheezes. She has no rales.   Abdominal: Soft. Bowel sounds are normal. She exhibits no distension and no mass. There is no tenderness.   No hepatosplenomegaly   Musculoskeletal: Normal range of motion. She exhibits no edema or deformity.   Lymphadenopathy:     She has no cervical adenopathy.   Neurological: She is alert. No cranial nerve deficit.   Oriented to self and place. Follows simple commands and answers questions appropriately   Skin: Skin is warm and dry. No pallor.   Nursing note and vitals reviewed.      Significant Labs: All pertinent labs within the past 24 hours have been reviewed.    Significant Imaging: I have reviewed all pertinent imaging results/findings within the past 24 hours.  I have reviewed and interpreted all pertinent imaging results/findings within the past 24 hours.    Assessment/Plan:      * Bacterial pneumonia  Chest x-ray notable for ill-defined bilateral lower lung interstitial opacities, concerning for pneumonia. Initially met sepsis criteria, now resolved.  D/C from [facility] recently. Chest x-ray indicates possible underlying history of pulmonary fibrosis. Changed moxi to PO. Currently stable at room air    Clostridium difficile infection  Diarrhea improved. Is less frequent and now with some formed stool. Continue metronidazole PO. Contact precautions      Hypertension, benign  Goal while inpatient is a systolic blood pressure less than 160mmHg. BP currently at goal. Continue verapamil    Hyperlipidemia  Statin      Hypothyroid  Continued current home regimen. TFT with low T3 but otherwise normal.    H/O: stroke  Stable. Cont ASA and statin.    Mild cognitive impairment  Supportive care    Alcohol use  Chronic alcohol use.  Monitor for DTs as benzos may cause AMS as well. No signs of this so far. Thiamine, folic acid and MVI    Elevated troponin  Likely cardiac strain associated with increased metabolic demand with acute infection and sepsis. Is asymptomatic. Echo with normal LV function and no wall motion abnormalities. Did show an intracardiac mass. Unknown cause or etiology. Currently not causing any symptoms. To be re-evaluated as outpatient. MPI as outpatinent. Appreciate cardiology recs.     HCAP (healthcare-associated pneumonia)  As above BCx NGTD.    Nonrheumatic aortic valve stenosis  Mild-mod    Abnormal echocardiogram findings without diagnosis  CHINTAN as above    Physical deconditioning  Patient at independent living facility prior to admission. It appears patient requires 24-hour assistance and skilled PT. No 24-hour assistance currently offered at either independent nor assisted living facility. No 24-hour assistance available at home. Consulted for SNF placement      Hypokalemia, resolved as of 5/4/2017  Replete and monitor      Neutrophilic leukocytosis, resolved as of 5/4/2017  Secondary to healthcare associated pneumonia    VTE Risk Mitigation         Ordered     enoxaparin injection 40 mg  Daily     Route:  Subcutaneous        05/02/17 1735     Medium Risk of VTE  Once      04/29/17 0141     Place EVERETTE hose  Until discontinued      04/29/17 0141     Place sequential compression device  Until discontinued      04/29/17 0141        Working on placement. Appreciate ' assistance on this.     Maria T Kenney MD  Department of Hospital Medicine   [facility]

## 2017-05-04 NOTE — SUBJECTIVE & OBJECTIVE
Review of Systems   Respiratory: Negative.    Cardiovascular: Negative.    Gastrointestinal: Positive for diarrhea.     Objective:     Vital Signs (Most Recent):  Temp: 98.5 °F (36.9 °C) (05/03/17 1636)  Pulse: 74 (05/03/17 1636)  Resp: 17 (05/03/17 1636)  BP: 126/60 (05/03/17 1636)  SpO2: 95 % (05/03/17 1855) Vital Signs (24h Range):  Temp:  [97.4 °F (36.3 °C)-98.5 °F (36.9 °C)] 98.5 °F (36.9 °C)  Pulse:  [68-87] 74  Resp:  [16-20] 17  SpO2:  [94 %-96 %] 95 %  BP: (116-137)/(55-65) 126/60     Weight: 59 kg (130 lb)  Body mass index is 21.63 kg/(m^2).    Intake/Output Summary (Last 24 hours) at 05/03/17 1903  Last data filed at 05/03/17 1000   Gross per 24 hour   Intake              120 ml   Output                0 ml   Net              120 ml      Physical Exam   Constitutional: She appears well-developed and well-nourished. No distress.   Pleasant, frail-appearing   HENT:   Right Ear: External ear normal.   Left Ear: External ear normal.   Nose: Nose normal.   Mouth/Throat: Oropharynx is clear and moist.   Eyes: Conjunctivae and EOM are normal. Pupils are equal, round, and reactive to light. No scleral icterus.   Neck: Normal range of motion. Neck supple. No thyromegaly present.   Cardiovascular: Normal rate.  Exam reveals no friction rub.    Murmur heard.  Pulmonary/Chest: Effort normal and breath sounds normal. No respiratory distress. She has no wheezes. She has no rales.   Abdominal: Soft. Bowel sounds are normal. She exhibits no distension and no mass. There is no tenderness.   No hepatosplenomegaly   Musculoskeletal: Normal range of motion. She exhibits no edema or deformity.   Lymphadenopathy:     She has no cervical adenopathy.   Neurological: She is alert. No cranial nerve deficit.   Oriented to self and place. Follows simple commands and answers questions appropriately   Skin: Skin is warm and dry. No pallor.   Nursing note and vitals reviewed.      Significant Labs:   CBC:     Recent Labs  Lab  05/02/17  0253   WBC 15.28*   HGB 13.4   HCT 40.4        CMP:     Recent Labs  Lab 05/02/17  0253      K 4.0      CO2 21*   GLU 78   BUN 5*   CREATININE 0.6   CALCIUM 8.5*   ANIONGAP 11   EGFRNONAA >60     TTE as above

## 2017-05-04 NOTE — PT/OT/SLP PROGRESS
Physical Therapy  Treatment    Sharona Acosta   MRN: [id number]   Admitting Diagnosis: Bacterial pneumonia    PT Received On: 17  PT Start Time: 1037     PT Stop Time: 1104    PT Total Time (min): 27 min       Billable Minutes:  Gait [id number] and Therapeutic Activity 12    Treatment Type: Treatment  PT/PTA: PTA     PTA Visit Number: 5       General Precautions: Standard, fall  Orthopedic Precautions: Full weight bearing   Braces: N/A         Subjective:  Communicated with nurse Hernández prior to session.  Pt agreeable to therapy. Pt's cousin by bedside.     Pain Ratin/10              Pain Rating Post-Intervention: 0/10    Objective:   Patient found with: telemetry, peripheral IV    Functional Mobility:  Bed Mobility: slow movement.   Rolling/Turning to Left: Stand by assistance  Scooting/Bridging: Contact Guard Assistance  Supine to Sit: Contact Guard Assistance    Transfers: x 3 trials. VC's for safety technique and walker management   Sit <> Stand Assistance: Contact Guard Assistance ( from bed) , Minimum Assistance ( from bedside chair )   Sit <> Stand Assistive Device: Rolling Walker    Gait:   Gait Distance: ~ 50 x 2 , pt required 1 seated rest break 2* to fatigue. Pt required MIN A for walker management. VC's for upright posture ans trunk extension during gait training .    Assistance 1: Contact Guard Assistance, Minimum assistance  Gait Assistive Device: Rolling walker  Gait Pattern: swing-to gait  Gait Deviation(s): decreased eliza, increased time in double stance, decreased velocity of limb motion, decreased toe-to-floor clearance, decreased swing-to-stance ratio, forward lean    Balance:   Static Sit: GOOD-: Takes MODERATE challenges from all directions but inconsistently  Dynamic Sit: GOOD-: Maintains balance through MODERATE excursions of active trunk movement,     Static Stand: FAIR: Maintains without assist but unable to take challenges  Dynamic stand: FAIR: Needs CONTACT GUARD during gait      Therapeutic Activities and Exercises:  Pt performed bed mobility, transfer and bed mobility.    Pt tolerated sitting balance EOB with S to don/doff gown.   AM-PAC 6 CLICK MOBILITY  How much help from another person does this patient currently need?   1 = Unable, Total/Dependent Assistance  2 = A lot, Maximum/Moderate Assistance  3 = A little, Minimum/Contact Guard/Supervision  4 = None, Modified [place]/Independent    Turning over in bed (including adjusting bedclothes, sheets and blankets)?: 4  Sitting down on and standing up from a chair with arms (e.g., wheelchair, bedside commode, etc.): 3  Moving from lying on back to sitting on the side of the bed?: 3  Moving to and from a bed to a chair (including a wheelchair)?: 3  Need to walk in hospital room?: 3  Climbing 3-5 steps with a railing?: 3  Total Score: 19    AM-PAC Raw Score CMS G-Code Modifier Level of Impairment Assistance   6 % Total / Unable   7 - 9 CM 80 - 100% Maximal Assist   10 - 14 CL 60 - 80% Moderate Assist   15 - 19 CK 40 - 60% Moderate Assist   20 - 22 CJ 20 - 40% Minimal Assist   23 CI 1-20% SBA / CGA   24 CH 0% Independent/ Mod I     Patient left up in chair with all lines intact, call button in reach, nurse Betty notified and cousin present.    Assessment:  Sharona Acosta is a 81 y.o. female with a medical diagnosis of Bacterial pneumonia     Rehab identified problem list/impairments: Rehab identified problem list/impairments: weakness, impaired endurance, gait instability, decreased lower extremity function, decreased coordination, decreased ROM    Rehab potential is good.    Activity tolerance: Good    Discharge recommendations:   pt will benefit from further skilled therapy in order to get back to PLOF.    Barriers to discharge: Barriers to Discharge: Decreased caregiver support    Equipment recommendations:   bedside commode.     GOALS:   Physical Therapy Goals        Problem: Physical Therapy Goal    Goal Priority  Disciplines Outcome Goal Variances Interventions   Physical Therapy Goal     PT/OT, PT Ongoing (interventions implemented as appropriate)     Description:  Goals to be met by: 17     Patient will increase functional independence with mobility by performin. Supine to sit with Supervision  2. Rolling to Left and Right with Supervision  3. Sit to stand transfer with Supervision using RW  4. Bed to chair transfer with Supervision using Rolling Walker  5. Gait  x 150 feet with Supervision using Rolling Walker   6. Lower extremity exercise program x 20 reps per handout, with supervision                PLAN:    Patient to be seen daily  to address the above listed problems via gait training, therapeutic activities, therapeutic exercises  Plan of Care expires: 17  Plan of Care reviewed with: patient         Dahiana Reyes, PTA  2017

## 2017-05-04 NOTE — ASSESSMENT & PLAN NOTE
Diarrhea improved. Is less frequent and now with some formed stool. Continue metronidazole PO. Contact precautions

## 2017-05-04 NOTE — SUBJECTIVE & OBJECTIVE
Interval History: no acute events. BMs are less frequent and now with some formed stool    Review of Systems   Respiratory: Negative.    Cardiovascular: Negative.    Gastrointestinal: Positive for diarrhea.     Objective:     Vital Signs (Most Recent):  Temp: 97.4 °F (36.3 °C) (05/04/17 1130)  Pulse: 74 (05/04/17 1130)  Resp: 18 (05/04/17 1130)  BP: (!) 131/58 (05/04/17 1130)  SpO2: (!) 91 % (05/04/17 1130) Vital Signs (24h Range):  Temp:  [97.4 °F (36.3 °C)-98.5 °F (36.9 °C)] 97.4 °F (36.3 °C)  Pulse:  [73-78] 74  Resp:  [17-18] 18  SpO2:  [91 %-95 %] 91 %  BP: (112-147)/(54-67) 131/58     Weight: 59 kg (130 lb)  Body mass index is 21.63 kg/(m^2).    Intake/Output Summary (Last 24 hours) at 05/04/17 1207  Last data filed at 05/04/17 0900   Gross per 24 hour   Intake              240 ml   Output                0 ml   Net              240 ml      Physical Exam   Constitutional: She appears well-developed and well-nourished. No distress.   Pleasant, frail-appearing   HENT:   Right Ear: External ear normal.   Left Ear: External ear normal.   Nose: Nose normal.   Mouth/Throat: Oropharynx is clear and moist.   Eyes: Conjunctivae and EOM are normal. Pupils are equal, round, and reactive to light. No scleral icterus.   Neck: Normal range of motion. Neck supple. No thyromegaly present.   Cardiovascular: Normal rate.  Exam reveals no friction rub.    Murmur heard.  Pulmonary/Chest: Effort normal and breath sounds normal. No respiratory distress. She has no wheezes. She has no rales.   Abdominal: Soft. Bowel sounds are normal. She exhibits no distension and no mass. There is no tenderness.   No hepatosplenomegaly   Musculoskeletal: Normal range of motion. She exhibits no edema or deformity.   Lymphadenopathy:     She has no cervical adenopathy.   Neurological: She is alert. No cranial nerve deficit.   Oriented to self and place. Follows simple commands and answers questions appropriately   Skin: Skin is warm and dry. No  pallor.   Nursing note and vitals reviewed.      Significant Labs: All pertinent labs within the past 24 hours have been reviewed.    Significant Imaging: I have reviewed all pertinent imaging results/findings within the past 24 hours.  I have reviewed and interpreted all pertinent imaging results/findings within the past 24 hours.

## 2017-05-05 ENCOUNTER — OUTPATIENT CASE MANAGEMENT (OUTPATIENT)
Dept: ADMINISTRATIVE | Facility: OTHER | Age: 82
End: 2017-05-05

## 2017-05-05 LAB
POCT GLUCOSE: 85 MG/DL (ref 70–110)
POCT GLUCOSE: 97 MG/DL (ref 70–110)

## 2017-05-05 PROCEDURE — 63600175 PHARM REV CODE 636 W HCPCS: Performed by: INTERNAL MEDICINE

## 2017-05-05 PROCEDURE — G8979 MOBILITY GOAL STATUS: HCPCS | Mod: CI

## 2017-05-05 PROCEDURE — 25000003 PHARM REV CODE 250: Performed by: HOSPITALIST

## 2017-05-05 PROCEDURE — G8978 MOBILITY CURRENT STATUS: HCPCS | Mod: CJ

## 2017-05-05 PROCEDURE — 94761 N-INVAS EAR/PLS OXIMETRY MLT: CPT

## 2017-05-05 PROCEDURE — 97116 GAIT TRAINING THERAPY: CPT

## 2017-05-05 PROCEDURE — 97110 THERAPEUTIC EXERCISES: CPT

## 2017-05-05 PROCEDURE — 12000002 HC ACUTE/MED SURGE SEMI-PRIVATE ROOM

## 2017-05-05 PROCEDURE — 25000003 PHARM REV CODE 250: Performed by: INTERNAL MEDICINE

## 2017-05-05 PROCEDURE — 25000003 PHARM REV CODE 250: Performed by: EMERGENCY MEDICINE

## 2017-05-05 RX ADMIN — SIMVASTATIN 40 MG: 40 TABLET, FILM COATED ORAL at 09:05

## 2017-05-05 RX ADMIN — THERA TABS 1 TABLET: TAB at 08:05

## 2017-05-05 RX ADMIN — ASPIRIN 81 MG CHEWABLE TABLET 81 MG: 81 TABLET CHEWABLE at 08:05

## 2017-05-05 RX ADMIN — FOLIC ACID 1 MG: 1 TABLET ORAL at 08:05

## 2017-05-05 RX ADMIN — LEVOTHYROXINE SODIUM 75 MCG: 75 TABLET ORAL at 08:05

## 2017-05-05 RX ADMIN — METRONIDAZOLE 500 MG: 500 TABLET ORAL at 09:05

## 2017-05-05 RX ADMIN — ENOXAPARIN SODIUM 40 MG: 100 INJECTION SUBCUTANEOUS at 05:05

## 2017-05-05 RX ADMIN — VERAPAMIL HYDROCHLORIDE 180 MG: 180 TABLET, FILM COATED, EXTENDED RELEASE ORAL at 08:05

## 2017-05-05 RX ADMIN — METRONIDAZOLE 500 MG: 500 TABLET ORAL at 05:05

## 2017-05-05 RX ADMIN — THIAMINE HCL TAB 100 MG 100 MG: 100 TAB at 08:05

## 2017-05-05 RX ADMIN — METRONIDAZOLE 500 MG: 500 TABLET ORAL at 02:05

## 2017-05-05 RX ADMIN — MOXIFLOXACIN HYDROCHLORIDE 400 MG: 400 TABLET, FILM COATED ORAL at 08:05

## 2017-05-05 NOTE — PROGRESS NOTES
[facility] [place] [facility]  Progress Note    Patient Name: Sharona Acosta  MRN: [id number]  Patient Class: IP- Inpatient   Admission Date: 4/28/2017  Length of Stay: 7 days  Attending Physician: Maria T Hester MD  Primary Care Provider: Vianey Lee MD        Subjective:     Principal Problem:Bacterial pneumonia    HPI:  Sharona Acosta is a 81 wo woman with alcohol abuse, arthritis, atherosclerosis of the aorta, h/o compression fracture of spine, h/o stroke (1997), hypothyroidism, HLD, essential HTN, osteopenia who presented with her daughter who reports the patient has been experiencing 3 days of progressive weakness and confusion.  She normally ambulates independently but she has not been doing so with her walker over the last 3 days. She had a fall and was taken to [facility] where she was treated for a urinary tract infection w/ IV antibiotics and released 3 days ago. No oral antibiotic continued as an outpatient.  Patient currently resides in an independent living facility and there is concern that she is unable to take care of herself at home at this time.  Report of subjective fever.  The history is otherwise limited due to the condition of the patient.     In the emergency department routine laboratory studies and chest x-ray were obtained.  There is evidence of ill-defined bilateral lower interstitial opacities concerning for healthcare associated pneumonia.  She also had hypokalemia.  Her mental status did not improve while in the emergency department.  She is also noted to have a slightlyelevated troponin level.     Hospital medicine has been asked to admit for further evaluation and treatment.         Hospital Course:  She was found to have HCAP likely bacterial with sepsis based on CXR, leukocytosis, Tmax 100.9, and tachycardia. Empiric abx with vanc and cefepime were started. Blood cultures were collected. She had quick symptomatic improvement. Abx were  deescalated to moxifloxacin on 4/30.  She had a slightly elevated troponin. Cardiology consulted. Echo showed LVEF 55%, +DD, mild-mod AS, and Possible 1.5x1.1 cm mass noted in RA/IVC. CHINTAN on 5/1 to further investigate showed no evidence of RA/IVC/SVC mass (noted on TTE, ?prominent Chiari network vs intraesophageal) and grade 2 atheroma disease of aorta.   PT/OT recommended home health PT/OT on discharge, however patient requires 24 hour assistance that is not provided at her independent living facility, an assisted living facility nor at home unless a sitter would become available which is an out of pocket service. Family had expressed inability to afford this. Seeking SNF or NH (nursing home) as options.       Interval History: no acute events. BMs are less frequent and now with some formed stool    Review of Systems   Respiratory: Negative.    Cardiovascular: Negative.    Gastrointestinal: Positive for diarrhea.     Objective:     Vital Signs (Most Recent):  Temp: 97.3 °F (36.3 °C) (05/05/17 1230)  Pulse: 88 (05/05/17 1230)  Resp: 18 (05/05/17 1230)  BP: (!) 191/82 (05/05/17 1230)  SpO2: 95 % (05/05/17 1230) Vital Signs (24h Range):  Temp:  [97.1 °F (36.2 °C)-98.3 °F (36.8 °C)] 97.3 °F (36.3 °C)  Pulse:  [71-88] 88  Resp:  [18] 18  SpO2:  [94 %-98 %] 95 %  BP: (134-191)/(60-82) 191/82     Weight: 59 kg (130 lb)  Body mass index is 21.63 kg/(m^2).    Intake/Output Summary (Last 24 hours) at 05/05/17 1635  Last data filed at 05/05/17 1000   Gross per 24 hour   Intake              480 ml   Output                0 ml   Net              480 ml      Physical Exam   Constitutional: She appears well-developed and well-nourished. No distress.   Pleasant, frail-appearing   HENT:   Right Ear: External ear normal.   Left Ear: External ear normal.   Nose: Nose normal.   Mouth/Throat: Oropharynx is clear and moist.   Eyes: Conjunctivae and EOM are normal. Pupils are equal, round, and reactive to light. No scleral icterus.   Neck:  Normal range of motion. Neck supple. No thyromegaly present.   Cardiovascular: Normal rate.  Exam reveals no friction rub.    Murmur heard.  Pulmonary/Chest: Effort normal and breath sounds normal. No respiratory distress. She has no wheezes. She has no rales.   Abdominal: Soft. Bowel sounds are normal. She exhibits no distension and no mass. There is no tenderness.   No hepatosplenomegaly   Musculoskeletal: Normal range of motion. She exhibits no edema or deformity.   Lymphadenopathy:     She has no cervical adenopathy.   Neurological: She is alert. No cranial nerve deficit.   Oriented to self and place. Follows simple commands and answers questions appropriately   Skin: Skin is warm and dry. No pallor.   Nursing note and vitals reviewed.      Significant Labs: All pertinent labs within the past 24 hours have been reviewed.    Significant Imaging: I have reviewed all pertinent imaging results/findings within the past 24 hours.  I have reviewed and interpreted all pertinent imaging results/findings within the past 24 hours.    Assessment/Plan:      * Bacterial pneumonia  Chest x-ray notable for ill-defined bilateral lower lung interstitial opacities, concerning for pneumonia. Initially met sepsis criteria, now resolved.  D/C from [facility] recently. Chest x-ray indicates possible underlying history of pulmonary fibrosis. Changed moxi to PO. Currently stable at room air    Clostridium difficile infection  Diarrhea improved. Is less frequent and now with some formed stool. IS CONSIDERED NO LONGER INFECTIOUS ONCE STOOL BECOMES SOLID. Continue metronidazole PO. Contact precautions      Hypertension, benign  Goal while inpatient is a systolic blood pressure less than 160mmHg. BP currently at goal. Continue verapamil    Hyperlipidemia  Statin      Hypothyroid  Continued current home regimen. TFT with low T3 but otherwise normal.    H/O: stroke  Stable. Cont ASA and statin.    Mild cognitive impairment  It is  mild. Patient is alert and follows all commands. Able to carry out conversation without issues.     Alcohol use  It does not appear to be an issue as she has not shown signs of withdrawal. Thiamine, folic acid and MVI    Physical deconditioning  Patient at independent living facility prior to admission. It appears patient requires 24-hour assistance and skilled PT. No 24-hour assistance currently offered at either independent nor assisted living facility. No 24-hour assistance available at home. Consulted for SNF placement      VTE Risk Mitigation         Ordered     enoxaparin injection 40 mg  Daily     Route:  Subcutaneous        05/02/17 1733     Medium Risk of VTE  Once      04/29/17 0141     Place EVERETTE hose  Until discontinued      04/29/17 0141     Place sequential compression device  Until discontinued      04/29/17 0141          Maria T Kenney MD  Department of Hospital Medicine   [facility]

## 2017-05-05 NOTE — SUBJECTIVE & OBJECTIVE
Interval History: no acute events. BMs are less frequent and now with some formed stool    Review of Systems   Respiratory: Negative.    Cardiovascular: Negative.    Gastrointestinal: Positive for diarrhea.     Objective:     Vital Signs (Most Recent):  Temp: 97.3 °F (36.3 °C) (05/05/17 1230)  Pulse: 88 (05/05/17 1230)  Resp: 18 (05/05/17 1230)  BP: (!) 191/82 (05/05/17 1230)  SpO2: 95 % (05/05/17 1230) Vital Signs (24h Range):  Temp:  [97.1 °F (36.2 °C)-98.3 °F (36.8 °C)] 97.3 °F (36.3 °C)  Pulse:  [71-88] 88  Resp:  [18] 18  SpO2:  [94 %-98 %] 95 %  BP: (134-191)/(60-82) 191/82     Weight: 59 kg (130 lb)  Body mass index is 21.63 kg/(m^2).    Intake/Output Summary (Last 24 hours) at 05/05/17 1635  Last data filed at 05/05/17 1000   Gross per 24 hour   Intake              480 ml   Output                0 ml   Net              480 ml      Physical Exam   Constitutional: She appears well-developed and well-nourished. No distress.   Pleasant, frail-appearing   HENT:   Right Ear: External ear normal.   Left Ear: External ear normal.   Nose: Nose normal.   Mouth/Throat: Oropharynx is clear and moist.   Eyes: Conjunctivae and EOM are normal. Pupils are equal, round, and reactive to light. No scleral icterus.   Neck: Normal range of motion. Neck supple. No thyromegaly present.   Cardiovascular: Normal rate.  Exam reveals no friction rub.    Murmur heard.  Pulmonary/Chest: Effort normal and breath sounds normal. No respiratory distress. She has no wheezes. She has no rales.   Abdominal: Soft. Bowel sounds are normal. She exhibits no distension and no mass. There is no tenderness.   No hepatosplenomegaly   Musculoskeletal: Normal range of motion. She exhibits no edema or deformity.   Lymphadenopathy:     She has no cervical adenopathy.   Neurological: She is alert. No cranial nerve deficit.   Oriented to self and place. Follows simple commands and answers questions appropriately   Skin: Skin is warm and dry. No pallor.    Nursing note and vitals reviewed.      Significant Labs: All pertinent labs within the past 24 hours have been reviewed.    Significant Imaging: I have reviewed all pertinent imaging results/findings within the past 24 hours.  I have reviewed and interpreted all pertinent imaging results/findings within the past 24 hours.

## 2017-05-05 NOTE — NURSING
Pt has remained free from falls and/or signs of infection this shift. Pt has remained within her comfort level as well. Will continue to monitor.

## 2017-05-05 NOTE — ASSESSMENT & PLAN NOTE
Diarrhea improved. Is less frequent and now with some formed stool. IS CONSIDERED NO LONGER INFECTIOUS ONCE STOOL BECOMES SOLID. Continue metronidazole PO. Contact precautions

## 2017-05-05 NOTE — PLAN OF CARE
Problem: Patient Care Overview  Goal: Plan of Care Review  Outcome: Ongoing (interventions implemented as appropriate)  Patient continues to have some loose stools. Ambulated in the [place] with PT and sat up in chair for several hours. Patient appetite is fair and she has had no complaints of pain. Awaiting placement.

## 2017-05-05 NOTE — PROGRESS NOTES
Chart reviewed. Message sent to  notifying her this RNCCM will follow pt after discharge. She reports [facility] has not accepted pt yet, as pt is C-diff postive. Partial assessment completed via chart review. Will follow up with pt/caregiver once discharged from the hospital.

## 2017-05-05 NOTE — PLAN OF CARE
Problem: Patient Care Overview  Goal: Plan of Care Review  Outcome: Ongoing (interventions implemented as appropriate)  Hope Naranjo, RN Registered Nurse Signed Med/Surg Nursing       xpand Allollapse All   [ ]Hide copied text  [ ]Hover for attribution information  Pt has remained free from falls and/or signs of infection this shift. Pt has remained within her comfort level as well. Will continue to monitor.

## 2017-05-05 NOTE — ASSESSMENT & PLAN NOTE
It does not appear to be an issue as she has not shown signs of withdrawal. Thiamine, folic acid and MVI

## 2017-05-05 NOTE — ASSESSMENT & PLAN NOTE
Chest x-ray notable for ill-defined bilateral lower lung interstitial opacities, concerning for pneumonia. Initially met sepsis criteria, now resolved.  D/C from [facility] recently. Chest x-ray indicates possible underlying history of pulmonary fibrosis. Changed moxi to PO. Currently stable at room air

## 2017-05-05 NOTE — PT/OT/SLP PROGRESS
Occupational Therapy  Treatment    Sharona Acosta   MRN: [id number]   Admitting Diagnosis: Bacterial pneumonia    OT Date of Treatment: 17   OT Start Time: 0840  OT Stop Time: 0850  OT Total Time (min): 10 min    Billable Minutes:  Therapeutic Exercise 10    General Precautions: Standard, fall  Orthopedic Precautions: N/A  Braces: N/A         Subjective:  Communicated with nurse prior to session.    Pain Ratin/10   Pain Rating Post-Intervention: 0/10    Objective:  Patient found with: telemetry, peripheral IV     Functional Mobility:  Bed Mobility:  Rolling/Turning to Left: Contact guard assistance  Rolling/Turning Right: Contact guard assistance    Activities of Daily Living:  Feeding Level of Assistance: Set-up Assistance  Therapeutic Activities and Exercises:  3 sets of UEs    AM-PAC 6 CLICK ADL   How much help from another person does this patient currently need?   1 = Unable, Total/Dependent Assistance  2 = A lot, Maximum/Moderate Assistance  3 = A little, Minimum/Contact Guard/Supervision  4 = None, Modified [place]/Independent    Putting on and taking off regular lower body clothing? : 1  Bathing (including washing, rinsing, drying)?: 2  Toileting, which includes using toilet, bedpan, or urinal? : 2  Putting on and taking off regular upper body clothing?: 3  Taking care of personal grooming such as brushing teeth?: 3  Eating meals?: 3  Total Score: 14     AM-PAC Raw Score CMS G-Code Modifier Level of Impairment Assistance   6 % Total / Unable   7 - 9 CM 80 - 100% Maximal Assist   10 - 14 CL 60 - 80% Moderate Assist   15 - 19 CK 40 - 60% Moderate Assist   20 - 22 CJ 20 - 40% Minimal Assist   23 CI 1-20% SBA / CGA   24 CH 0% Independent/ Mod I     Patient left supine with all lines intact and call button in reach    ASSESSMENT:  Sharona Acosta is a 81 y.o. female with a medical diagnosis of Bacterial pneumonia and presents with decreased memory and orientation.    Rehab identified problem  list/impairments: Rehab identified problem list/impairments: weakness, impaired endurance, impaired self care skills, impaired functional mobilty, gait instability, impaired balance, impaired cognition, decreased coordination, impaired joint extensibility    Rehab potential is fair.    Activity tolerance: Good    Discharge recommendations: Discharge Facility/Level Of Care Needs: home health OT     Barriers to discharge: Barriers to Discharge: Other (Comment) (decreased safety awareness)    Equipment recommendations: bedside commode     GOALS:   Occupational Therapy Goals        Problem: Occupational Therapy Goal    Goal Priority Disciplines Outcome Interventions   Occupational Therapy Goal     OT, PT/OT Ongoing (interventions implemented as appropriate)    Description:  Goals to be met by: 5.10.17     Patient will increase functional independence with ADLs by performing:    Feeding with Set-up Assistance.  UE Dressing with Minimal Assistance.  Grooming while seated with Contact Guard Assistance.  Rolling to Right with Set-up Assistance.   Upper extremity exercise program x10 reps per handout, with assistance as needed.                Plan:  Patient to be seen 3 x/week to address the above listed problems via self-care/home management, therapeutic activities, therapeutic exercises  Plan of Care expires: 05/12/17  Plan of Care reviewed with: patient         Susan Garg OT  05/05/2017

## 2017-05-05 NOTE — PT/OT/SLP PROGRESS
Physical Therapy  Treatment/6th visit    Sharona Acosta   MRN: [id number]   Admitting Diagnosis: Bacterial pneumonia    PT Received On: 17  PT Start Time: 1132     PT Stop Time: 1158    PT Total Time (min): 26 min       Billable Minutes:  Gait Training 13 min and Therapeutic Exercise 13 min    Treatment Type: Treatment  PT/PTA: PT     PTA Visit Number: 0       General Precautions: Standard, fall, [place], special contact  Orthopedic Precautions: Full weight bearing     Subjective:    Pt reported feeling better today.    Pain Ratin/10                   Objective:   Patient found with: peripheral IV, telemetry    Functional Mobility:  Bed Mobility:   Scooting/Bridging: Contact Guard Assistance  Supine to Sit: Contact Guard Assistance    Transfers:  Sit <> Stand Assistance: Minimum Assistance  Sit <> Stand Assistive Device: Rolling Walker    Gait:   Gait Distance: 150 ft; Pt with forward flexed posture, min VC's for safety with RW management.  Assistance 1: Contact Guard Assistance  Gait Assistive Device: Rolling walker  Gait Pattern: swing-through gait  Gait Deviation(s): decreased step length, decreased velocity of limb motion, decreased eliza    Balance:   Static Sit: FAIR: Maintains without assist, but unable to take any challenges   Dynamic Sit: FAIR: Cannot move trunk without losing balance  Static Stand: FAIR: Maintains without assist but unable to take challenges  Dynamic stand: FAIR: Needs CONTACT GUARD during gait     Therapeutic Activities and Exercises:  BLE seated therex 2 sets of 10 reps: heel/toe raises, LAQ/HS, hip flex, pillow squeezes, and GS      AM-PAC 6 CLICK MOBILITY  How much help from another person does this patient currently need?   1 = Unable, Total/Dependent Assistance  2 = A lot, Maximum/Moderate Assistance  3 = A little, Minimum/Contact Guard/Supervision  4 = None, Modified [place]/Independent    Turning over in bed (including adjusting bedclothes, sheets and blankets)?:  4  Sitting down on and standing up from a chair with arms (e.g., wheelchair, bedside commode, etc.): 3  Moving from lying on back to sitting on the side of the bed?: 4  Moving to and from a bed to a chair (including a wheelchair)?: 3  Need to walk in hospital room?: 3  Climbing 3-5 steps with a railing?: 3  Total Score: 20    AM-PAC Raw Score CMS G-Code Modifier Level of Impairment Assistance   6 % Total / Unable   7 - 9 CM 80 - 100% Maximal Assist   10 - 14 CL 60 - 80% Moderate Assist   15 - 19 CK 40 - 60% Moderate Assist   20 - 22 CJ 20 - 40% Minimal Assist   23 CI 1-20% SBA / CGA   24 CH 0% Independent/ Mod I     Patient left up in chair with all lines intact, call button in reach and nurse Betty notified.    Assessment:    Rehab identified problem list/impairments: Rehab identified problem list/impairments: weakness, impaired endurance, impaired self care skills, impaired functional mobilty, gait instability, impaired balance, impaired cognition, decreased upper extremity function, decreased lower extremity function, decreased safety awareness    Rehab potential is good.    Activity tolerance: Fair    Discharge recommendations: Discharge Facility/Level Of Care Needs:  (Pt will benefit from further skilled PT services.  Pt will need 24 hour supervision.)     Barriers to discharge: Barriers to Discharge: Other (Comment) (decreased safety awareness)    Equipment recommendations: Equipment Needed After Discharge: bedside commode     GOALS:   Physical Therapy Goals        Problem: Physical Therapy Goal    Goal Priority Disciplines Outcome Goal Variances Interventions   Physical Therapy Goal     PT/OT, PT Ongoing (interventions implemented as appropriate)     Description:  Goals to be met by: 17     Patient will increase functional independence with mobility by performin. Supine to sit with Supervision  2. Rolling to Left and Right with Supervision  3. Sit to stand transfer with Supervision using  RW  4. Bed to chair transfer with Supervision using Rolling Walker  5. Gait  x 150 feet with Supervision using Rolling Walker   6. Lower extremity exercise program x 20 reps per handout, with supervision                PLAN:    Patient to be seen 5 x/week (Mon-Fri)  to address the above listed problems via gait training, therapeutic activities, therapeutic exercises  Plan of Care expires: 05/13/17  Plan of Care reviewed with: patient    PT G-Codes  Functional Assessment Tool Used: AM-PAC  Score: 20  Functional Limitation: Mobility: Walking and moving around  Mobility: Walking and Moving Around Current Status (): ENRIQUETA  Mobility: Walking and Moving Around Goal Status (): SAHARA Delgado, PT  05/05/2017

## 2017-05-05 NOTE — PLAN OF CARE
Problem: Physical Therapy Goal  Goal: Physical Therapy Goal  Goals to be met by: 17     Patient will increase functional independence with mobility by performin. Supine to sit with Supervision  2. Rolling to Left and Right with Supervision  3. Sit to stand transfer with Supervision using RW  4. Bed to chair transfer with Supervision using Rolling Walker  5. Gait x 150 feet with Supervision using Rolling Walker   6. Lower extremity exercise program x 20 reps per handout, with supervision   Outcome: Ongoing (interventions implemented as appropriate)  Pt feeling much better, able to ambulate further in the hallway with assistance today.

## 2017-05-05 NOTE — CLINICAL REVIEW
PT/PTA met face to face to discuss patient's treatment plan and progress towards established goals.  Treatment will be continued as described in initial report/eval and progress notes.  Patient will be seen by physical therapist every sixth visit and minimally once per month.

## 2017-05-05 NOTE — ASSESSMENT & PLAN NOTE
It is mild. Patient is alert and follows all commands. Able to carry out conversation without issues.

## 2017-05-06 PROBLEM — J18.9 HCAP (HEALTHCARE-ASSOCIATED PNEUMONIA): Status: RESOLVED | Noted: 2017-04-29 | Resolved: 2017-05-06

## 2017-05-06 LAB
ANION GAP SERPL CALC-SCNC: 7 MMOL/L
BUN SERPL-MCNC: 4 MG/DL
CALCIUM SERPL-MCNC: 8.8 MG/DL
CHLORIDE SERPL-SCNC: 102 MMOL/L
CO2 SERPL-SCNC: 25 MMOL/L
CREAT SERPL-MCNC: 0.8 MG/DL
EST. GFR  (AFRICAN AMERICAN): >60 ML/MIN/1.73 M^2
EST. GFR  (NON AFRICAN AMERICAN): >60 ML/MIN/1.73 M^2
GLUCOSE SERPL-MCNC: 110 MG/DL
MAGNESIUM SERPL-MCNC: 1.4 MG/DL
POTASSIUM SERPL-SCNC: 3 MMOL/L
SODIUM SERPL-SCNC: 134 MMOL/L

## 2017-05-06 PROCEDURE — 63600175 PHARM REV CODE 636 W HCPCS: Performed by: INTERNAL MEDICINE

## 2017-05-06 PROCEDURE — 25000003 PHARM REV CODE 250: Performed by: HOSPITALIST

## 2017-05-06 PROCEDURE — 25000003 PHARM REV CODE 250: Performed by: EMERGENCY MEDICINE

## 2017-05-06 PROCEDURE — 63600175 PHARM REV CODE 636 W HCPCS: Performed by: EMERGENCY MEDICINE

## 2017-05-06 PROCEDURE — 36415 COLL VENOUS BLD VENIPUNCTURE: CPT

## 2017-05-06 PROCEDURE — 80048 BASIC METABOLIC PNL TOTAL CA: CPT

## 2017-05-06 PROCEDURE — 83735 ASSAY OF MAGNESIUM: CPT

## 2017-05-06 PROCEDURE — 12000002 HC ACUTE/MED SURGE SEMI-PRIVATE ROOM

## 2017-05-06 PROCEDURE — 25000003 PHARM REV CODE 250: Performed by: INTERNAL MEDICINE

## 2017-05-06 RX ADMIN — MOXIFLOXACIN HYDROCHLORIDE 400 MG: 400 TABLET, FILM COATED ORAL at 09:05

## 2017-05-06 RX ADMIN — METRONIDAZOLE 500 MG: 500 TABLET ORAL at 09:05

## 2017-05-06 RX ADMIN — ONDANSETRON 4 MG: 2 INJECTION INTRAMUSCULAR; INTRAVENOUS at 04:05

## 2017-05-06 RX ADMIN — THERA TABS 1 TABLET: TAB at 09:05

## 2017-05-06 RX ADMIN — SIMVASTATIN 40 MG: 40 TABLET, FILM COATED ORAL at 09:05

## 2017-05-06 RX ADMIN — LEVOTHYROXINE SODIUM 75 MCG: 75 TABLET ORAL at 09:05

## 2017-05-06 RX ADMIN — ENOXAPARIN SODIUM 40 MG: 100 INJECTION SUBCUTANEOUS at 04:05

## 2017-05-06 RX ADMIN — VERAPAMIL HYDROCHLORIDE 180 MG: 180 TABLET, FILM COATED, EXTENDED RELEASE ORAL at 09:05

## 2017-05-06 RX ADMIN — METRONIDAZOLE 500 MG: 500 TABLET ORAL at 02:05

## 2017-05-06 RX ADMIN — FOLIC ACID 1 MG: 1 TABLET ORAL at 09:05

## 2017-05-06 RX ADMIN — THIAMINE HCL TAB 100 MG 100 MG: 100 TAB at 09:05

## 2017-05-06 RX ADMIN — ASPIRIN 81 MG CHEWABLE TABLET 81 MG: 81 TABLET CHEWABLE at 09:05

## 2017-05-06 RX ADMIN — METRONIDAZOLE 500 MG: 500 TABLET ORAL at 05:05

## 2017-05-06 NOTE — NURSING
Pt has remained free from falls and/or signs of infection. Pt has remained within her comfort zone. Will continue to monitor.

## 2017-05-06 NOTE — PLAN OF CARE
Problem: Patient Care Overview  Goal: Plan of Care Review  Outcome: Ongoing (interventions implemented as appropriate)    05/06/17 7300   Coping/Psychosocial   Plan Of Care Reviewed With patient;daughter  (Adali Kaur)   Antibiotic treatment in progress, with po flagyl.  No problems noted.  Mrs. Acosta remains a fall risk, no fall this visit.  Skin remains at risk for impairment, will continue to assist patient with repositioning and activity is encouraged.  No breakdown noted.

## 2017-05-06 NOTE — SUBJECTIVE & OBJECTIVE
Interval History: no acute events. BMs not water and without mucous. Currently loose    Review of Systems   Respiratory: Negative.    Cardiovascular: Negative.    Gastrointestinal: Positive for diarrhea (loose stools).     Objective:     Vital Signs (Most Recent):  Temp: 98.1 °F (36.7 °C) (05/06/17 1208)  Pulse: 74 (05/06/17 1208)  Resp: 19 (05/06/17 1208)  BP: 129/61 (05/06/17 1208)  SpO2: (!) 94 % (05/06/17 1208) Vital Signs (24h Range):  Temp:  [97.3 °F (36.3 °C)-98.6 °F (37 °C)] 98.1 °F (36.7 °C)  Pulse:  [69-76] 74  Resp:  [16-19] 19  SpO2:  [94 %-96 %] 94 %  BP: (122-154)/(61-71) 129/61     Weight: 59 kg (130 lb)  Body mass index is 21.63 kg/(m^2).    Intake/Output Summary (Last 24 hours) at 05/06/17 1312  Last data filed at 05/06/17 0500   Gross per 24 hour   Intake               60 ml   Output                0 ml   Net               60 ml      Physical Exam   Constitutional: She appears well-developed and well-nourished. No distress.   Pleasant   HENT:   Right Ear: External ear normal.   Left Ear: External ear normal.   Nose: Nose normal.   Mouth/Throat: Oropharynx is clear and moist.   Eyes: Conjunctivae and EOM are normal. Pupils are equal, round, and reactive to light. No scleral icterus.   Neck: Normal range of motion. Neck supple. No thyromegaly present.   Cardiovascular: Normal rate.  Exam reveals no friction rub.    Murmur heard.  Pulmonary/Chest: Effort normal and breath sounds normal. No respiratory distress. She has no wheezes. She has no rales.   Abdominal: Soft. Bowel sounds are normal. She exhibits no distension and no mass. There is no tenderness.   No hepatosplenomegaly   Musculoskeletal: Normal range of motion. She exhibits no edema or deformity.   Lymphadenopathy:     She has no cervical adenopathy.   Neurological: She is alert. No cranial nerve deficit.   Oriented to self and place. Follows simple commands and answers questions appropriately   Skin: Skin is warm and dry. No pallor.    Nursing note and vitals reviewed.      Significant Labs: All pertinent labs within the past 24 hours have been reviewed.    Significant Imaging: I have reviewed all pertinent imaging results/findings within the past 24 hours.  I have reviewed and interpreted all pertinent imaging results/findings within the past 24 hours.

## 2017-05-06 NOTE — PROGRESS NOTES
[facility] [place] [facility]  Progress Note    Patient Name: Sharona Acosta  MRN: [id number]  Patient Class: IP- Inpatient   Admission Date: 4/28/2017  Length of Stay: 8 days  Attending Physician: Maria T Hester MD  Primary Care Provider: Vianey Lee MD        Subjective:     Principal Problem:Bacterial pneumonia    HPI:  Sharona Acosta is a 81 wo woman with alcohol abuse, arthritis, atherosclerosis of the aorta, h/o compression fracture of spine, h/o stroke (1997), hypothyroidism, HLD, essential HTN, osteopenia who presented with her daughter who reports the patient has been experiencing 3 days of progressive weakness and confusion.  She normally ambulates independently but she has not been doing so with her walker over the last 3 days. She had a fall and was taken to [facility] where she was treated for a urinary tract infection w/ IV antibiotics and released 3 days ago. No oral antibiotic continued as an outpatient.  Patient currently resides in an independent living facility and there is concern that she is unable to take care of herself at home at this time.  Report of subjective fever.  The history is otherwise limited due to the condition of the patient.     In the emergency department routine laboratory studies and chest x-ray were obtained.  There is evidence of ill-defined bilateral lower interstitial opacities concerning for healthcare associated pneumonia.  She also had hypokalemia.  Her mental status did not improve while in the emergency department.  She is also noted to have a slightlyelevated troponin level.     Hospital medicine has been asked to admit for further evaluation and treatment.         Hospital Course:  She was found to have HCAP likely bacterial with sepsis based on CXR, leukocytosis, Tmax 100.9, and tachycardia. Empiric abx with vanc and cefepime were started. Blood cultures were collected. She had quick symptomatic improvement. Abx were  deescalated to moxifloxacin on 4/30.  She had a slightly elevated troponin. Cardiology consulted. Echo showed LVEF 55%, +DD, mild-mod AS, and Possible 1.5x1.1 cm mass noted in RA/IVC. CHINTAN on 5/1 to further investigate showed no evidence of RA/IVC/SVC mass (noted on TTE, ?prominent Chiari network vs intraesophageal) and grade 2 atheroma disease of aorta.   PT/OT recommended home health PT/OT on discharge, however patient requires 24 hour assistance that is not provided at her independent living facility, an assisted living facility nor at home unless a sitter would become available which is an out of pocket service. Family had expressed inability to afford this. Seeking SNF or NH (half-way) as options.       Interval History: no acute events. BMs not water and without mucous. Currently loose    Review of Systems   Respiratory: Negative.    Cardiovascular: Negative.    Gastrointestinal: Positive for diarrhea (loose stools).     Objective:     Vital Signs (Most Recent):  Temp: 98.1 °F (36.7 °C) (05/06/17 1208)  Pulse: 74 (05/06/17 1208)  Resp: 19 (05/06/17 1208)  BP: 129/61 (05/06/17 1208)  SpO2: (!) 94 % (05/06/17 1208) Vital Signs (24h Range):  Temp:  [97.3 °F (36.3 °C)-98.6 °F (37 °C)] 98.1 °F (36.7 °C)  Pulse:  [69-76] 74  Resp:  [16-19] 19  SpO2:  [94 %-96 %] 94 %  BP: (122-154)/(61-71) 129/61     Weight: 59 kg (130 lb)  Body mass index is 21.63 kg/(m^2).    Intake/Output Summary (Last 24 hours) at 05/06/17 1312  Last data filed at 05/06/17 0500   Gross per 24 hour   Intake               60 ml   Output                0 ml   Net               60 ml      Physical Exam   Constitutional: She appears well-developed and well-nourished. No distress.   Pleasant   HENT:   Right Ear: External ear normal.   Left Ear: External ear normal.   Nose: Nose normal.   Mouth/Throat: Oropharynx is clear and moist.   Eyes: Conjunctivae and EOM are normal. Pupils are equal, round, and reactive to light. No scleral icterus.   Neck:  Normal range of motion. Neck supple. No thyromegaly present.   Cardiovascular: Normal rate.  Exam reveals no friction rub.    Murmur heard.  Pulmonary/Chest: Effort normal and breath sounds normal. No respiratory distress. She has no wheezes. She has no rales.   Abdominal: Soft. Bowel sounds are normal. She exhibits no distension and no mass. There is no tenderness.   No hepatosplenomegaly   Musculoskeletal: Normal range of motion. She exhibits no edema or deformity.   Lymphadenopathy:     She has no cervical adenopathy.   Neurological: She is alert. No cranial nerve deficit.   Oriented to self and place. Follows simple commands and answers questions appropriately   Skin: Skin is warm and dry. No pallor.   Nursing note and vitals reviewed.      Significant Labs: All pertinent labs within the past 24 hours have been reviewed.    Significant Imaging: I have reviewed all pertinent imaging results/findings within the past 24 hours.  I have reviewed and interpreted all pertinent imaging results/findings within the past 24 hours.    Assessment/Plan:      * Bacterial pneumonia  Chest x-ray notable for ill-defined bilateral lower lung interstitial opacities, concerning for pneumonia. Initially met sepsis criteria, now resolved.  D/C from [facility] recently. Chest x-ray indicates possible underlying history of pulmonary fibrosis. Changed moxi to PO. Abx treatment completed. Currently stable at room air    Clostridium difficile infection  Diarrhea improved. Is less frequent and with some formed stool. No water or mucous present. IS CONSIDERED NO LONGER INFECTIOUS ONCE STOOL BECOMES SOLID. NO NEED FOR REPEAT STOOL STUDIES FOR CDIFF. Continue metronidazole PO. Contact precautions      Hypertension, benign  Goal while inpatient is a systolic blood pressure less than 160mmHg. BP currently at goal. Continue verapamil    Hyperlipidemia  Statin      Hypothyroid  Continued current home regimen. TFT with low T3 but  otherwise normal.    H/O: stroke  Stable. Cont ASA and statin.    Mild cognitive impairment  It is mild. Patient is alert and follows all commands. Able to carry out conversation without issues.     Alcohol use  It does not appear to be an issue as she has not shown signs of withdrawal. Thiamine, folic acid and MVI    Elevated troponin  Likely cardiac strain associated with increased metabolic demand with acute infection and sepsis. Is asymptomatic. Echo with normal LV function and no wall motion abnormalities. Did show an intracardiac mass. Unknown cause or etiology. Currently not causing any symptoms. To be re-evaluated as outpatient. MPI as outpatinent. Appreciate cardiology recs.     Nonrheumatic aortic valve stenosis  Mild-mod    Abnormal echocardiogram findings without diagnosis  CHINTAN as above    Physical deconditioning  Patient at independent living facility prior to admission. It appears patient requires 24-hour assistance and skilled PT. No 24-hour assistance currently offered at either independent nor assisted living facility. No 24-hour assistance available at home. Consulted for SNF placement      HCAP (healthcare-associated pneumonia), resolved as of 5/6/2017  As above BCx NGTD.    VTE Risk Mitigation         Ordered     enoxaparin injection 40 mg  Daily     Route:  Subcutaneous        05/02/17 1735     Medium Risk of VTE  Once      04/29/17 0141     Place EVERETTE hose  Until discontinued      04/29/17 0141     Place sequential compression device  Until discontinued      04/29/17 0141        Continue treatment for CDiff. Currently on day#4. Pending placement    Maria T Kenney MD  Department of Hospital Medicine   [facility]

## 2017-05-06 NOTE — NURSING
Had to reapply cardiac monitor, Nurse Stephanie and I verified that it is in place and monitoring.  #[id number]

## 2017-05-06 NOTE — ASSESSMENT & PLAN NOTE
Diarrhea improved. Is less frequent and with some formed stool. No water or mucous present. IS CONSIDERED NO LONGER INFECTIOUS ONCE STOOL BECOMES SOLID. NO NEED FOR REPEAT STOOL STUDIES FOR CDIFF. Continue metronidazole PO. Contact precautions

## 2017-05-06 NOTE — ASSESSMENT & PLAN NOTE
Chest x-ray notable for ill-defined bilateral lower lung interstitial opacities, concerning for pneumonia. Initially met sepsis criteria, now resolved.  D/C from [facility] recently. Chest x-ray indicates possible underlying history of pulmonary fibrosis. Changed moxi to PO. Abx treatment completed. Currently stable at room air

## 2017-05-06 NOTE — PLAN OF CARE
Problem: Patient Care Overview  Goal: Plan of Care Review  Outcome: Ongoing (interventions implemented as appropriate)  Hope Naranjo, RN Registered Nurse Signed Med/Surg Nursing       xpand Allollapse All   [ ]Hide copied text  [ ]Hover for attribution information  Pt has remained free from falls and/or signs of infection. Pt has remained within her comfort zone. Will continue to monitor.

## 2017-05-06 NOTE — NURSING
New onset of Afib noted. Dr Kenney notified with new orders. Awaiting EKG and bmp and mag level. When resulted call Dr Kenney. Heart rate 71

## 2017-05-07 PROCEDURE — 25000003 PHARM REV CODE 250: Performed by: HOSPITALIST

## 2017-05-07 PROCEDURE — 63600175 PHARM REV CODE 636 W HCPCS: Performed by: INTERNAL MEDICINE

## 2017-05-07 PROCEDURE — 25000003 PHARM REV CODE 250: Performed by: INTERNAL MEDICINE

## 2017-05-07 PROCEDURE — 93005 ELECTROCARDIOGRAM TRACING: CPT

## 2017-05-07 PROCEDURE — 25000003 PHARM REV CODE 250: Performed by: EMERGENCY MEDICINE

## 2017-05-07 PROCEDURE — 12000002 HC ACUTE/MED SURGE SEMI-PRIVATE ROOM

## 2017-05-07 RX ADMIN — SIMVASTATIN 40 MG: 40 TABLET, FILM COATED ORAL at 10:05

## 2017-05-07 RX ADMIN — THERA TABS 1 TABLET: TAB at 09:05

## 2017-05-07 RX ADMIN — METRONIDAZOLE 500 MG: 500 TABLET ORAL at 06:05

## 2017-05-07 RX ADMIN — METRONIDAZOLE 500 MG: 500 TABLET ORAL at 01:05

## 2017-05-07 RX ADMIN — ASPIRIN 81 MG CHEWABLE TABLET 81 MG: 81 TABLET CHEWABLE at 09:05

## 2017-05-07 RX ADMIN — THIAMINE HCL TAB 100 MG 100 MG: 100 TAB at 09:05

## 2017-05-07 RX ADMIN — FOLIC ACID 1 MG: 1 TABLET ORAL at 09:05

## 2017-05-07 RX ADMIN — VERAPAMIL HYDROCHLORIDE 180 MG: 180 TABLET, FILM COATED, EXTENDED RELEASE ORAL at 09:05

## 2017-05-07 RX ADMIN — METRONIDAZOLE 500 MG: 500 TABLET ORAL at 10:05

## 2017-05-07 RX ADMIN — ENOXAPARIN SODIUM 40 MG: 100 INJECTION SUBCUTANEOUS at 05:05

## 2017-05-07 RX ADMIN — LEVOTHYROXINE SODIUM 75 MCG: 75 TABLET ORAL at 09:05

## 2017-05-07 NOTE — SUBJECTIVE & OBJECTIVE
Interval History: no acute events. BMs continued to decrease in frequency. Still loose.    Review of Systems   Respiratory: Negative.    Cardiovascular: Negative.    Gastrointestinal: Positive for diarrhea (loose stools).     Objective:     Vital Signs (Most Recent):  Temp: 98.4 °F (36.9 °C) (05/07/17 1223)  Pulse: 70 (05/07/17 1223)  Resp: 18 (05/07/17 1223)  BP: (!) 111/53 (05/07/17 1223)  SpO2: (!) 90 % (05/07/17 1223) Vital Signs (24h Range):  Temp:  [98.1 °F (36.7 °C)-98.7 °F (37.1 °C)] 98.4 °F (36.9 °C)  Pulse:  [66-75] 70  Resp:  [16-18] 18  SpO2:  [90 %-96 %] 90 %  BP: (111-137)/(53-63) 111/53     Weight: 59 kg (130 lb)  Body mass index is 21.63 kg/(m^2).    Intake/Output Summary (Last 24 hours) at 05/07/17 1244  Last data filed at 05/06/17 1900   Gross per 24 hour   Intake              120 ml   Output                0 ml   Net              120 ml      Physical Exam   Constitutional: She appears well-developed and well-nourished. No distress.   Pleasant   HENT:   Right Ear: External ear normal.   Left Ear: External ear normal.   Nose: Nose normal.   Mouth/Throat: Oropharynx is clear and moist.   Eyes: Conjunctivae and EOM are normal. Pupils are equal, round, and reactive to light. No scleral icterus.   Neck: Normal range of motion. Neck supple. No thyromegaly present.   Cardiovascular: Normal rate.  Exam reveals no friction rub.    Murmur heard.  Pulmonary/Chest: Effort normal and breath sounds normal. No respiratory distress. She has no wheezes. She has no rales.   Abdominal: Soft. Bowel sounds are normal. She exhibits no distension and no mass. There is no tenderness.   No hepatosplenomegaly   Musculoskeletal: Normal range of motion. She exhibits no edema or deformity.   Lymphadenopathy:     She has no cervical adenopathy.   Neurological: She is alert. No cranial nerve deficit.   Oriented to self and place. Follows simple commands and answers questions appropriately   Skin: Skin is warm and dry. No pallor.    Nursing note and vitals reviewed.      Significant Labs: All pertinent labs within the past 24 hours have been reviewed.    Significant Imaging: I have reviewed all pertinent imaging results/findings within the past 24 hours.  I have reviewed and interpreted all pertinent imaging results/findings within the past 24 hours.

## 2017-05-07 NOTE — PROGRESS NOTES
[facility] [place] [facility]  Progress Note    Patient Name: Sharona Acosta  MRN: [id number]  Patient Class: IP- Inpatient   Admission Date: 4/28/2017  Length of Stay: 9 days  Attending Physician: Maria T Hester MD  Primary Care Provider: Vianey Lee MD        Subjective:     Principal Problem:Bacterial pneumonia    HPI:  Sharona Acosta is a 81 wo woman with alcohol abuse, arthritis, atherosclerosis of the aorta, h/o compression fracture of spine, h/o stroke (1997), hypothyroidism, HLD, essential HTN, osteopenia who presented with her daughter who reports the patient has been experiencing 3 days of progressive weakness and confusion.  She normally ambulates independently but she has not been doing so with her walker over the last 3 days. She had a fall and was taken to [facility] where she was treated for a urinary tract infection w/ IV antibiotics and released 3 days ago. No oral antibiotic continued as an outpatient.  Patient currently resides in an independent living facility and there is concern that she is unable to take care of herself at home at this time.  Report of subjective fever.  The history is otherwise limited due to the condition of the patient.     In the emergency department routine laboratory studies and chest x-ray were obtained.  There is evidence of ill-defined bilateral lower interstitial opacities concerning for healthcare associated pneumonia.  She also had hypokalemia.  Her mental status did not improve while in the emergency department.  She is also noted to have a slightlyelevated troponin level.     Hospital medicine has been asked to admit for further evaluation and treatment.         Hospital Course:  She was found to have HCAP likely bacterial with sepsis based on CXR, leukocytosis, Tmax 100.9, and tachycardia. Empiric abx with vanc and cefepime were started. Blood cultures were collected. She had quick symptomatic improvement. Abx were  deescalated to moxifloxacin on 4/30. This regimen completed while inpatient. Patient also with watery diarrhea confirmed to be CDiff. On metronidazole.   She had a slightly elevated troponin. Cardiology consulted. Echo showed LVEF 55%, +DD, mild-mod AS, and Possible 1.5x1.1 cm mass noted in RA/IVC. CHINTAN on 5/1 to further investigate showed no evidence of RA/IVC/SVC mass (noted on TTE, ?prominent Chiari network vs intraesophageal) and grade 2 atheroma disease of aorta.   PT/OT recommended home health PT/OT on discharge, however patient requires 24 hour assistance that is not provided at her independent living facility, an assisted living facility nor at home unless a sitter would become available which is an out of pocket service. Family had expressed inability to afford this. Seeking SNF vs NH ([facility]) as options.       Interval History: no acute events. BMs continued to decrease in frequency. Still loose.    Review of Systems   Respiratory: Negative.    Cardiovascular: Negative.    Gastrointestinal: Positive for diarrhea (loose stools).     Objective:     Vital Signs (Most Recent):  Temp: 98.4 °F (36.9 °C) (05/07/17 1223)  Pulse: 70 (05/07/17 1223)  Resp: 18 (05/07/17 1223)  BP: (!) 111/53 (05/07/17 1223)  SpO2: (!) 90 % (05/07/17 1223) Vital Signs (24h Range):  Temp:  [98.1 °F (36.7 °C)-98.7 °F (37.1 °C)] 98.4 °F (36.9 °C)  Pulse:  [66-75] 70  Resp:  [16-18] 18  SpO2:  [90 %-96 %] 90 %  BP: (111-137)/(53-63) 111/53     Weight: 59 kg (130 lb)  Body mass index is 21.63 kg/(m^2).    Intake/Output Summary (Last 24 hours) at 05/07/17 1244  Last data filed at 05/06/17 1900   Gross per 24 hour   Intake              120 ml   Output                0 ml   Net              120 ml      Physical Exam   Constitutional: She appears well-developed and well-nourished. No distress.   Pleasant   HENT:   Right Ear: External ear normal.   Left Ear: External ear normal.   Nose: Nose normal.   Mouth/Throat: Oropharynx is clear and  moist.   Eyes: Conjunctivae and EOM are normal. Pupils are equal, round, and reactive to light. No scleral icterus.   Neck: Normal range of motion. Neck supple. No thyromegaly present.   Cardiovascular: Normal rate.  Exam reveals no friction rub.    Murmur heard.  Pulmonary/Chest: Effort normal and breath sounds normal. No respiratory distress. She has no wheezes. She has no rales.   Abdominal: Soft. Bowel sounds are normal. She exhibits no distension and no mass. There is no tenderness.   No hepatosplenomegaly   Musculoskeletal: Normal range of motion. She exhibits no edema or deformity.   Lymphadenopathy:     She has no cervical adenopathy.   Neurological: She is alert. No cranial nerve deficit.   Oriented to self and place. Follows simple commands and answers questions appropriately   Skin: Skin is warm and dry. No pallor.   Nursing note and vitals reviewed.      Significant Labs: All pertinent labs within the past 24 hours have been reviewed.    Significant Imaging: I have reviewed all pertinent imaging results/findings within the past 24 hours.  I have reviewed and interpreted all pertinent imaging results/findings within the past 24 hours.    Assessment/Plan:      * Bacterial pneumonia  Chest x-ray notable for ill-defined bilateral lower lung interstitial opacities, concerning for pneumonia. Initially met sepsis criteria, now resolved.  D/C from [facility] recently. Chest x-ray indicates possible underlying history of pulmonary fibrosis. Changed moxi to PO. Abx treatment completed. Currently stable at room air    Clostridium difficile infection  Diarrhea improved. Is loose but less frequent. No water or mucous present. IS CONSIDERED NO LONGER INFECTIOUS ONCE STOOL BECOMES SOLID. NO NEED FOR REPEAT STOOL STUDIES FOR CDIFF. Continue metronidazole PO. Contact precautions      Hypertension, benign  Goal while inpatient is a systolic blood pressure less than 160mmHg. BP currently at goal. Continue  verapamil    Hyperlipidemia  Statin      Hypothyroid  Continued current home regimen. TFT with low T3 but otherwise normal.    H/O: stroke  Stable. Cont ASA and statin.    Mild cognitive impairment  It is mild. Patient is alert and follows all commands. Able to carry out conversation without issues.     Alcohol use  It does not appear to be an issue as she has not shown signs of withdrawal. Thiamine, folic acid and MVI    Elevated troponin  Likely cardiac strain associated with increased metabolic demand with acute infection and sepsis. Is asymptomatic. Echo with normal LV function and no wall motion abnormalities. Did show an intracardiac mass. Unknown cause or etiology. Currently not causing any symptoms. To be re-evaluated as outpatient. MPI as outpatinent. Appreciate cardiology recs.     Nonrheumatic aortic valve stenosis  Mild-mod    Abnormal echocardiogram findings without diagnosis  CHINTAN as above    Physical deconditioning  Patient at independent living facility prior to admission. It appears patient requires 24-hour assistance and skilled PT. No 24-hour assistance currently offered at either independent nor assisted living facility. No 24-hour assistance available at home. Consulted for SNF placement      VTE Risk Mitigation         Ordered     enoxaparin injection 40 mg  Daily     Route:  Subcutaneous        05/02/17 1735     Medium Risk of VTE  Once      04/29/17 0141     Place EVERETTE hose  Until discontinued      04/29/17 0141     Place sequential compression device  Until discontinued      04/29/17 0141        Pending placement    Maria T Kenney MD  Department of Hospital Medicine   [facility]

## 2017-05-07 NOTE — ASSESSMENT & PLAN NOTE
Diarrhea improved. Is loose but less frequent. No water or mucous present. IS CONSIDERED NO LONGER INFECTIOUS ONCE STOOL BECOMES SOLID. NO NEED FOR REPEAT STOOL STUDIES FOR CDIFF. Continue metronidazole PO. Contact precautions

## 2017-05-07 NOTE — ASSESSMENT & PLAN NOTE
Chest x-ray notable for ill-defined bilateral lower lung interstitial opacities, concerning for pneumonia. Initially met sepsis criteria, now resolved.  D/C from [facility] recently. Chest x-ray indicates possible underlying history of pulmonary fibrosis. Changed moxi to PO. Abx treatment completed. Currently stable at room air

## 2017-05-08 ENCOUNTER — OUTPATIENT CASE MANAGEMENT (OUTPATIENT)
Dept: ADMINISTRATIVE | Facility: OTHER | Age: 82
End: 2017-05-08

## 2017-05-08 PROCEDURE — 25000003 PHARM REV CODE 250: Performed by: HOSPITALIST

## 2017-05-08 PROCEDURE — 25000003 PHARM REV CODE 250: Performed by: INTERNAL MEDICINE

## 2017-05-08 PROCEDURE — 63600175 PHARM REV CODE 636 W HCPCS: Performed by: INTERNAL MEDICINE

## 2017-05-08 PROCEDURE — 94761 N-INVAS EAR/PLS OXIMETRY MLT: CPT

## 2017-05-08 PROCEDURE — 12000002 HC ACUTE/MED SURGE SEMI-PRIVATE ROOM

## 2017-05-08 PROCEDURE — 25000003 PHARM REV CODE 250: Performed by: EMERGENCY MEDICINE

## 2017-05-08 PROCEDURE — 97530 THERAPEUTIC ACTIVITIES: CPT

## 2017-05-08 PROCEDURE — 97535 SELF CARE MNGMENT TRAINING: CPT

## 2017-05-08 RX ADMIN — FOLIC ACID 1 MG: 1 TABLET ORAL at 08:05

## 2017-05-08 RX ADMIN — VERAPAMIL HYDROCHLORIDE 180 MG: 180 TABLET, FILM COATED, EXTENDED RELEASE ORAL at 08:05

## 2017-05-08 RX ADMIN — METRONIDAZOLE 500 MG: 500 TABLET ORAL at 10:05

## 2017-05-08 RX ADMIN — METRONIDAZOLE 500 MG: 500 TABLET ORAL at 06:05

## 2017-05-08 RX ADMIN — THIAMINE HCL TAB 100 MG 100 MG: 100 TAB at 08:05

## 2017-05-08 RX ADMIN — METRONIDAZOLE 500 MG: 500 TABLET ORAL at 01:05

## 2017-05-08 RX ADMIN — LEVOTHYROXINE SODIUM 75 MCG: 75 TABLET ORAL at 08:05

## 2017-05-08 RX ADMIN — ASPIRIN 81 MG CHEWABLE TABLET 81 MG: 81 TABLET CHEWABLE at 08:05

## 2017-05-08 RX ADMIN — ENOXAPARIN SODIUM 40 MG: 100 INJECTION SUBCUTANEOUS at 04:05

## 2017-05-08 RX ADMIN — SIMVASTATIN 40 MG: 40 TABLET, FILM COATED ORAL at 10:05

## 2017-05-08 RX ADMIN — THERA TABS 1 TABLET: TAB at 08:05

## 2017-05-08 NOTE — PROGRESS NOTES
An [facility] welcome Packet and consent form was created and mailed to the patient on 5-8-17        Thank you,    Maite Art, SSC

## 2017-05-08 NOTE — LETTER
May 8, 2017    Sharona Acosta  [address]  [facility] [address]             Outpatient Case Management  [address]  [facility] [address] Dear Sharona Acosta:    We understand that receiving many services from different doctors and healthcare providers is overwhelming. There are appointments to make, transportation to arrange, dietary instructions to understand, and new medications to obtain.    This is where [facility] can help.     You are eligible to receive Outpatient Case Management services when you have healthcare needs that require the coordination of many providers, treatments, and services. You also qualify if you need assistance with a new treatment plan.     There is no charge for this support. You may have been referred to this program from your doctor(s), hospital staff member(s), or insurance company but you always have a choice to participate or not participate. To participate, you must give us your permission to be enrolled.     When you are enrolled in the [facility] program, the  who is assigned to you is    Sharonda Vo, RN  [phone]    Depending on your needs and wishes, your  may speak with you by phone, visit you at your place of living (for example your home, skilled nursing facility, or rehabilitation facility), or meet you at your doctors office.     Your  will tell you why you have been selected to participate in the program and will complete an assessment of your needs. Then a personalized plan of care will be developed with you and or your caregiver.             Here are examples of the services your [facility]  provides.     Coordinate communication among multiple providers.   Arrange for transportation, doctors visits, durable medical equipment, home care services, and special clinics.    Provide coaching on how to manage your health condition.     Answer questions about your health condition.   Help you understand your doctors treatment plan.    Provide additional instruction about your health condition, treatments, and medications.    Help you obtain information about your insurance coverage.    Advocate for your individual needs.    Request a Licensed Clinical  (LCSW) to visit you if you need their services. LCSWs help with long term planning (discussing placement options, advanced planning directives), financial planning, and assistance (for example rent, utilities, medication funding).     Your  will coordinate their activities with other outpatient services you are receiving. All services provided by [facility]  are coordinated with and communicated to your primary care physician.    Our goal is to help you manage your health condition(s) safely within your living environment, whether that is your home or a medical facility. We want to help you function at the healthiest and highest level possible.     Sincerely,      Adrian Bonilla MD  Medical Director    Enclosures:    Frequently Asked Questions  Patient Rights and Responsibilities   Reporting a Grievance or Complaint  Consent/Release of Information  Stamped Addressed Envelope                  Frequently Asked Questions about [facility]    What is [facility]?  Outpatient Case management is not Home healthcare services. [facility]  do not provide hands-on care. [facility]  will work with your doctor to arrange for home health services, if needed. Home health services have a limited duration and there are some restrictions as to who can get these services. There is no prescribed limit to the amount of time you receive [facility] services. [facility]  are not agents of your insurance company. However, [facility]  can help you obtain information from your insurance company.     Who are the [facility] ?  [facility]  are Registered Nurses and Social Workers. It is important to remember that you and your  are a team that works together with your primary care physician to create your individualized plan of care. The ultimate goal of your care plan is to help you implement your doctors treatment plan and to help you function at the highest level of health possible.     What are my rights as a patient?  It is important for you to know and understand your rights and responsibilities while receiving services from the [facility] program. We have enclosed a complete description of your rights and responsibilities. You can help to make your care more effective when you understand your right and responsibilities.     What is needed to be enrolled in the program?  You are only enrolled in the [facility] Program when you give us your consent to participate. You will find enclosed a consent form. You are receiving this letter because you or your caregivers have given us a verbal consent to enroll you in [facility] Program. We ask that you sign and return the enclosed written consent in the stamped self-addressed envelope.                           Patient Rights and Responsibilities    We consider you a partner in your care. When you are well informed, participate in treatment decisions and communicate openly with your doctor and other healthcare professionals, you help make your care more effective.     While you are in the Outpatient Case Management Program, your rights include the following:     You have a right to be provided services in a non-discriminatory manner in accordance with the provisions of Title VI of the Civil Rights Act of 1964, Section 504 of the Rehabilitation Act of  1973, the Age Discrimination Act of 1975, the Americans with Disabilities Act as well as any other applicable Federal and State laws and regulations.     You have the right to a reasonable, timely response to your request or need for care, as well as the right to considerate and respectful care including an environment that preserves dignity and contributes to a positive self-image. You are responsible for being considerate and respectful of our staff.     You have a right to information regarding patient rights, advocacy services and complaint mechanisms, and the right to prompt resolution of any complaint. You or a designee has the right to participate in the resolution of ethical issues surrounding your care. You have a right to file a complaint if you feel that your rights have been infringed, without fear or penalty from [facility] or the federal government. You may file a complaint with the Director of Outpatient Case Management by calling [phone]. At any time, you may lodge a grievance with the [facility] [place] by calling [phone], or the Joint Commission on Accreditation of Healthcare Organizations at [phone].     You, or someone acting on your behalf, have the right to understandable information on your health status, treatment and progress in order to make decisions. You have the right to know the nature, risks and alternatives to treatment. You have the right to be informed, when appropriate, regarding the outcome of the care that has been provided. You have the right to refuse treatment to the extent permitted by law, and the right to be informed of the alternatives and consequences of refusing treatment.     You, in collaboration with your physician, have the right to make decisions regarding care and the right to participate in the development and implementation of the plan of care and effective pain management. You have the right to know the name and  professional status of those responsible for the delivery of your care and treatment.       You have a right, within legal guidelines, to have a guardian, next-of-kin or legal designee exercises your patient rights when you are unable to do so. You have the right for your wishes regarding end-of-life decisions to be addressed by the healthcare team through advance directives. You have the right to personal privacy and confidentiality and to expect confidentiality of all records and communications pertaining to your care.      You have the right to receive communications about your health information confidentially. You have the right to request restrictions on the uses and disclosures of your health information. You have the right to inspect, copy, request amendments and receive an accounting of to whom we have disclosed your health information.     You have the right to be provided with interpretation services if you do not speak English; to alternative communication techniques if you are hearing or vision impaired; and to have any other resources needed on your behalf to ensure effective communication. These services are provided free of charge.     You have a right to personal safety (free from mental, physical, sexual and verbal abuse, neglect and exploitation). You have the right to access protective and advocacy services.     Advance Directives  A Patient Advocate is available to meet with patients to answer questions regarding advance directives.    Living Will  A document that outlines what medical treatment the patient does or does not want in the event the patient becomes unable to make those decisions at the appropriate time.    Durable Medical Power of   A document by which the patient designates an individual to be responsible for making medical decisions in the event the patient becomes unable to do so.    HIPAA Notice of Privacy Practices  Your medical information is governed by federal  privacy laws. HIPAA protects private medical information and how that information is disclosed. If you have a question regarding the HIPAA Notice of Privacy Practices, or if you believe your privacy rights have been violated, you may call our designated hotline at [phone].            Quality Improvement  Because we consistently strive to improve the care and service provided to our patients, we welcome your feedback. Your comments are an important part of our quality improvement process, as we like to know what we are doing right and which areas are in need of improvement. Our policy is to listen, be responsive and provide you with an appropriate and timely follow-up to your questions or concerns. Our goal is active patient and family involvement in all aspects of the care process.                                                                                  Reporting a Grievance or Complaint    During your time with the [facility] team you may have a grievance or complaint with our services. Your Patients Bill of Rights gives patients, families, and caregivers the right to express concerns and grievances and the right to expect a reasonable and timely response.     Your presentation of your concerns is not viewed negatively. It is an opportunity for us to improve the quality of our care and services we provide to you.     You may report your concerns directly to your , or you can phone in a complaint to:     Director of Outpatient Case Management  [phone]    You may also send a complaint letter to:    Director of Outpatient Case Management Services  [address] [place] [id number]    Tell us the details of your complaint and provide us with a contact phone number so we can contact you to obtain additional information. We will return a call to you within two business days of our receipt of your complaint, and to request additional information as  needed. If you choose to mail a letter, your complaint may take a few days longer to reach us.     All grievances will be addressed as quickly as possible. A grievance or complaint that involves situations or practices which place patients in immediate danger will be addressed as an urgent matter. We will work to resolve all other complaints within seven days of receipt. By that time, you will receive a phone call with either the resolution of your complaint, or a plan for corrective action. A formal written response will be sent to you within 30 days of receipt of your grievance.     If a resolution cannot be completed within 30 days, a letter will be sent to you or your family member with an estimated time for the final response.    Additionally, all patients have the right to file complaints with external agencies, without exception. Complaints/grievances can be addressed to the following agencies:            Patient Safety or Quality of Care Concerns  Office of Quality Monitoring   [facility] [place] [address]  [phone] Toll Free    HIPPA Privacy/Security Concerns  Office for Civil Rights Region IV  [place] Department of Health & Human Services  [address] [place] [address], [address]  [place] [address] [phone] Phone  [phone] TDD  [phone] Toll Free    Medicare/Medicaid Billing Concerns  [place] for Medicare & Medicaid Services  Region [address]  [address] [place] [address], [address]  [place] [address] [phone] Phone  [phone] Toll Free    General Concerns  [facility] ([facility])  [phone] Toll Free Complaint Hotline                                                                  Consent Form/Release of Information    By signing--     (1) I agree I have read the Outpatient Case Management information provided to me;     (2) I agree to voluntarily participate in the Outpatient Case Management program;     (3) I understand  I must consent to participation in the Outpatient Case Management program during my first interview with my ;    (4) I consent to the discussion and release of my personal health information to my healthcare team (including my personal physician, my medical home care team, any specialty physician(s), and my [facility] team);     (5) I agree my consent is valid for the length of time I am receiving Outpatient Case Management;    (6) I agree to referrals to community resources which my Case Management team recommends for me. I agree to the release of my personal information and personal health information as necessary to referral sources.    ___________________________________________________________________  Patients Printed Name     ___________________________________________________________________  Patients Signature       Date    If patient is in being cared for, please complete this section:     ___________________________________________________________________  Printed Name of Person Caring For Patient   Relationship To Patient    ___________________________________________________________________   Signature of Person Caring For Patient     Date    PLEASE SIGN AND RETURN IN THE ENCLOSED PRE-ADDRESSED ENVELOPE.

## 2017-05-08 NOTE — PLAN OF CARE
05/08/17 1615   Discharge Reassessment   Assessment Type Discharge Planning Reassessment   Discharge plan remains the same: Yes   Provided patient/caregiver education on the expected discharge date and the discharge plan Yes   Discharge Plan A Skilled Nursing Facility   Discharge Plan B New Nursing Home placement - FDC care facility   Change in patient condition or support system No   Patient choice form signed by patient/caregiver N/A   Explained to the the patient/caregiver why the discharge planned changed: Yes   Involved the patient/caregiver in establishing a new discharge plan: Yes     The plan remains the same for discharge. The goal for pt is to transfer to [facility] SNF at [facility]. JOSE ENRIQUE contacted Debo with [facility] and per Debo pt family is currently meeting with admissions to workout financial obligations. Per Debo the pt may ole over 50 thousand to the [facility] and may not qualify for medicaid. Per Debo, she will contact JOSE ENRIQUE once meeting is over and inform SW if pt will qualify for medicaid financial assistance. JOSE ENRIQUE sent updates progress notes to [facility] via [facility]. SW awaiting call back to determine services provided.

## 2017-05-08 NOTE — ASSESSMENT & PLAN NOTE
Chest x-ray notable for ill-defined bilateral lower lung interstitial opacities, concerning for pneumonia. Initially met sepsis criteria, now resolved.  D/C from [facility] recently. Chest x-ray indicates possible underlying history of pulmonary fibrosis. Changed moxi to PO. Abx treatment completed. Currently stable at room air

## 2017-05-08 NOTE — PLAN OF CARE
Problem: Patient Care Overview  Goal: Plan of Care Review  Outcome: Ongoing (interventions implemented as appropriate)  Pt needing 24 hr care and is possible workup for SNF placement, able to address needs w/some episodes of confusion and redirection given, turn Q2, barrier cream to buttocks r/t irritation, safety maintained, tolerating minimal diet this shift, bed low locked and in position, will cont plan of care

## 2017-05-08 NOTE — SUBJECTIVE & OBJECTIVE
Interval History: no acute events. BMs much less frequent. Still loose.    Review of Systems   Respiratory: Negative.    Cardiovascular: Negative.    Gastrointestinal: Positive for diarrhea (loose stools).     Objective:     Vital Signs (Most Recent):  Temp: 98 °F (36.7 °C) (05/08/17 0821)  Pulse: 77 (05/08/17 0821)  Resp: 18 (05/08/17 0821)  BP: 130/60 (05/08/17 0821)  SpO2: (!) 91 % (05/08/17 0821) Vital Signs (24h Range):  Temp:  [97.3 °F (36.3 °C)-99.2 °F (37.3 °C)] 98 °F (36.7 °C)  Pulse:  [70-77] 77  Resp:  [17-20] 18  SpO2:  [90 %-97 %] 91 %  BP: (111-137)/(53-69) 130/60     Weight: 59 kg (130 lb)  Body mass index is 21.63 kg/(m^2).  No intake or output data in the 24 hours ending 05/08/17 0911   Physical Exam   Constitutional: She appears well-developed and well-nourished. No distress.   Pleasant   HENT:   Right Ear: External ear normal.   Left Ear: External ear normal.   Nose: Nose normal.   Mouth/Throat: Oropharynx is clear and moist.   Eyes: Conjunctivae and EOM are normal. Pupils are equal, round, and reactive to light. No scleral icterus.   Neck: Normal range of motion. Neck supple. No thyromegaly present.   Cardiovascular: Normal rate.  Exam reveals no friction rub.    Murmur heard.  Pulmonary/Chest: Effort normal and breath sounds normal. No respiratory distress. She has no wheezes. She has no rales.   Abdominal: Soft. Bowel sounds are normal. She exhibits no distension and no mass. There is no tenderness.   No hepatosplenomegaly   Musculoskeletal: Normal range of motion. She exhibits no edema or deformity.   Lymphadenopathy:     She has no cervical adenopathy.   Neurological: She is alert. No cranial nerve deficit.   Oriented to self and place. Follows simple commands and answers questions appropriately   Skin: Skin is warm and dry. No pallor.   Nursing note and vitals reviewed.      Significant Labs: All pertinent labs within the past 24 hours have been reviewed.    Significant Imaging: I have  reviewed all pertinent imaging results/findings within the past 24 hours.  I have reviewed and interpreted all pertinent imaging results/findings within the past 24 hours.

## 2017-05-08 NOTE — PT/OT/SLP PROGRESS
Physical Therapy  Treatment    Sharona Acosta   MRN: [id number]   Admitting Diagnosis: Bacterial pneumonia    PT Received On: 17  PT Start Time: 1207     PT Stop Time: 1218    PT Total Time (min): 11 min       Billable Minutes:  Therapeutic Activity 11 min    Treatment Type: Treatment  PT/PTA: PT     PTA Visit Number: 0       General Precautions: Standard, fall, contact  Orthopedic Precautions: Full weight bearing     Subjective:    Pt reported feeling fine, ready to go home today.    Pain Ratin/10                   Objective:   Patient found with: peripheral IV, telemetry    Functional Mobility: Pt required extra time and VC's to complete task.    Bed Mobility:   Scooting/Bridging: Stand by Assistance  Supine to Sit: Stand by Assistance  Sit to Supine: Stand by Assistance    Transfers:  Sit <> Stand Assistance: Contact Guard Assistance  Sit <> Stand Assistive Device: Rolling Walker    Gait:   Gait Distance: unable 2* pt with soiled diaper      Balance:   Static Sit: FAIR+: Able to take MINIMAL challenges from all directions  Dynamic Sit: FAIR+: Maintains balance through MINIMAL excursions of active trunk motion  Static Stand: FAIR+: Takes MINIMAL challenges from all directions  Dynamic stand: FAIR: Needs CONTACT GUARD during gait     Therapeutic Activities and Exercises:  tx session limited 2* pt with soiled diaper; Pt continued to have liquid stool.     AM-PAC 6 CLICK MOBILITY  How much help from another person does this patient currently need?   1 = Unable, Total/Dependent Assistance  2 = A lot, Maximum/Moderate Assistance  3 = A little, Minimum/Contact Guard/Supervision  4 = None, Modified [place]/Independent    Turning over in bed (including adjusting bedclothes, sheets and blankets)?: 4  Sitting down on and standing up from a chair with arms (e.g., wheelchair, bedside commode, etc.): 4  Moving from lying on back to sitting on the side of the bed?: 4  Moving to and from a bed to a chair (including  a wheelchair)?: 3  Need to walk in hospital room?: 3  Climbing 3-5 steps with a railing?: 3  Total Score: 21    AM-PAC Raw Score CMS G-Code Modifier Level of Impairment Assistance   6 % Total / Unable   7 - 9 CM 80 - 100% Maximal Assist   10 - 14 CL 60 - 80% Moderate Assist   15 - 19 CK 40 - 60% Moderate Assist   20 - 22 CJ 20 - 40% Minimal Assist   23 CI 1-20% SBA / CGA   24 CH 0% Independent/ Mod I     Patient left HOB elevated with all lines intact, call button in reach, bed alarm on and PCT notified.    Assessment:      Rehab identified problem list/impairments: Rehab identified problem list/impairments: weakness, impaired endurance, impaired self care skills, impaired functional mobilty, gait instability, impaired balance, impaired cognition, decreased upper extremity function, decreased lower extremity function, decreased safety awareness    Rehab potential is fair+    Activity tolerance: Fair    Discharge recommendations: Discharge Facility/Level Of Care Needs:  (Pt will benefit from further skilled PT services.  Pt will need 24 hour supervision.)     Barriers to discharge: Barriers to Discharge: Other (Comment) (decreased safety awareness)    Equipment recommendations: Equipment Needed After Discharge: bedside commode     GOALS:   Physical Therapy Goals        Problem: Physical Therapy Goal    Goal Priority Disciplines Outcome Goal Variances Interventions   Physical Therapy Goal     PT/OT, PT Ongoing (interventions implemented as appropriate)     Description:  Goals to be met by: 17     Patient will increase functional independence with mobility by performin. Supine to sit with Supervision  2. Rolling to Left and Right with Supervision  3. Sit to stand transfer with Supervision using RW  4. Bed to chair transfer with Supervision using Rolling Walker  5. Gait  x 150 feet with Supervision using Rolling Walker   6. Lower extremity exercise program x 20 reps per handout, with supervision                 PLAN:    Patient to be seen 5 x/week (Mon-Fri)  to address the above listed problems via gait training, therapeutic activities, therapeutic exercises  Plan of Care expires: 05/13/17  Plan of Care reviewed with: patient         Nory Delgado, PT  05/08/2017

## 2017-05-08 NOTE — PROGRESS NOTES
[facility] Medicine  Progress Note    Patient Name: Sharona Acosta  MRN: [id number]  Patient Class: IP- Inpatient   Admission Date: 4/28/2017  Length of Stay: 10 days  Attending Physician: Maria T Hester MD  Primary Care Provider: Vianey Lee MD        Subjective:     Principal Problem:Bacterial pneumonia    HPI:  Sharona Acosta is a 81 wo woman with alcohol abuse, arthritis, atherosclerosis of the aorta, h/o compression fracture of spine, h/o stroke (1997), hypothyroidism, HLD, essential HTN, osteopenia who presented with her daughter who reports the patient has been experiencing 3 days of progressive weakness and confusion.  She normally ambulates independently but she has not been doing so with her walker over the last 3 days. She had a fall and was taken to [facility] where she was treated for a urinary tract infection w/ IV antibiotics and released 3 days ago. No oral antibiotic continued as an outpatient.  Patient currently resides in an independent living facility and there is concern that she is unable to take care of herself at home at this time.  Report of subjective fever.  The history is otherwise limited due to the condition of the patient.     In the emergency department routine laboratory studies and chest x-ray were obtained.  There is evidence of ill-defined bilateral lower interstitial opacities concerning for healthcare associated pneumonia.  She also had hypokalemia.  Her mental status did not improve while in the emergency department.  She is also noted to have a slightlyelevated troponin level.     Hospital medicine has been asked to admit for further evaluation and treatment.         Hospital Course:  She was found to have HCAP likely bacterial with sepsis based on CXR, leukocytosis, Tmax 100.9, and tachycardia. Empiric abx with vanc and cefepime were started. Blood cultures were collected. She had quick symptomatic improvement. Abx were  deescalated to moxifloxacin on 4/30. This regimen completed while inpatient. Patient also with watery diarrhea confirmed to be CDiff. On metronidazole.   She had a slightly elevated troponin. Cardiology consulted. Echo showed LVEF 55%, +DD, mild-mod AS, and Possible 1.5x1.1 cm mass noted in RA/IVC. CHINTAN on 5/1 to further investigate showed no evidence of RA/IVC/SVC mass (noted on TTE, ?prominent Chiari network vs intraesophageal) and grade 2 atheroma disease of aorta.   PT/OT recommended home health PT/OT on discharge, however patient requires 24 hour assistance that is not provided at her independent living facility, an assisted living facility nor at home unless a sitter would become available which is an out of pocket service. Family had expressed inability to afford this. Seeking SNF vs NH ([facility]) as options.       Interval History: no acute events. BMs much less frequent. Still loose.    Review of Systems   Respiratory: Negative.    Cardiovascular: Negative.    Gastrointestinal: Positive for diarrhea (loose stools).     Objective:     Vital Signs (Most Recent):  Temp: 98 °F (36.7 °C) (05/08/17 0821)  Pulse: 77 (05/08/17 0821)  Resp: 18 (05/08/17 0821)  BP: 130/60 (05/08/17 0821)  SpO2: (!) 91 % (05/08/17 0821) Vital Signs (24h Range):  Temp:  [97.3 °F (36.3 °C)-99.2 °F (37.3 °C)] 98 °F (36.7 °C)  Pulse:  [70-77] 77  Resp:  [17-20] 18  SpO2:  [90 %-97 %] 91 %  BP: (111-137)/(53-69) 130/60     Weight: 59 kg (130 lb)  Body mass index is 21.63 kg/(m^2).  No intake or output data in the 24 hours ending 05/08/17 0911   Physical Exam   Constitutional: She appears well-developed and well-nourished. No distress.   Pleasant   HENT:   Right Ear: External ear normal.   Left Ear: External ear normal.   Nose: Nose normal.   Mouth/Throat: Oropharynx is clear and moist.   Eyes: Conjunctivae and EOM are normal. Pupils are equal, round, and reactive to light. No scleral icterus.   Neck: Normal range of motion. Neck supple. No  thyromegaly present.   Cardiovascular: Normal rate.  Exam reveals no friction rub.    Murmur heard.  Pulmonary/Chest: Effort normal and breath sounds normal. No respiratory distress. She has no wheezes. She has no rales.   Abdominal: Soft. Bowel sounds are normal. She exhibits no distension and no mass. There is no tenderness.   No hepatosplenomegaly   Musculoskeletal: Normal range of motion. She exhibits no edema or deformity.   Lymphadenopathy:     She has no cervical adenopathy.   Neurological: She is alert. No cranial nerve deficit.   Oriented to self and place. Follows simple commands and answers questions appropriately   Skin: Skin is warm and dry. No pallor.   Nursing note and vitals reviewed.      Significant Labs: All pertinent labs within the past 24 hours have been reviewed.    Significant Imaging: I have reviewed all pertinent imaging results/findings within the past 24 hours.  I have reviewed and interpreted all pertinent imaging results/findings within the past 24 hours.    Assessment/Plan:      * Bacterial pneumonia  Chest x-ray notable for ill-defined bilateral lower lung interstitial opacities, concerning for pneumonia. Initially met sepsis criteria, now resolved.  D/C from [facility] recently. Chest x-ray indicates possible underlying history of pulmonary fibrosis. Changed moxi to PO. Abx treatment completed. Currently stable at room air    Clostridium difficile infection  Diarrhea improved. Is loose but less frequent. No water or mucous present. IS CONSIDERED NO LONGER INFECTIOUS ONCE STOOL BECOMES SOLID. NO NEED FOR REPEAT STOOL STUDIES FOR CDIFF. Continue metronidazole PO. Currently on day # 6. Contact precautions      History of vertebral fracture        Hypertension, benign  Goal while inpatient is a systolic blood pressure less than 160mmHg. BP currently at goal. Continue verapamil    Hyperlipidemia  Statin      Hypothyroid  Continued current home regimen. TFT with low T3 but  otherwise normal.    H/O: stroke  Stable. Cont ASA and statin.    Mild cognitive impairment  It is mild. Patient is alert and follows all commands. Able to carry out conversation without issues.     Alcohol use  It does not appear to be an issue as she has not shown signs of withdrawal. Thiamine, folic acid and MVI    Elevated troponin  Likely cardiac strain associated with increased metabolic demand with acute infection and sepsis. Is asymptomatic. Echo with normal LV function and no wall motion abnormalities. Did show an intracardiac mass. Unknown cause or etiology. Currently not causing any symptoms. To be re-evaluated as outpatient. MPI as outpatinent. Appreciate cardiology recs.     Nonrheumatic aortic valve stenosis  Mild-mod    Abnormal echocardiogram findings without diagnosis  CHINTAN as above    Physical deconditioning  Patient at independent living facility prior to admission. It appears patient requires 24-hour assistance and skilled PT. No 24-hour assistance currently offered at either independent nor assisted living facility. No 24-hour assistance available at home. Consulted for SNF placement      VTE Risk Mitigation         Ordered     enoxaparin injection 40 mg  Daily     Route:  Subcutaneous        05/02/17 7735     Medium Risk of VTE  Once      04/29/17 0141     Place EVERETTE hose  Until discontinued      04/29/17 0141     Place sequential compression device  Until discontinued      04/29/17 0141        Pending placement    Maria T Kenney MD  Department of Hospital Medicine   [facility]

## 2017-05-08 NOTE — PROGRESS NOTES
Pt MEGAN Hurtado MRN [id number] on tele monitor [id number] verified w/Yeny SALEH visible and audible on monitor

## 2017-05-08 NOTE — ASSESSMENT & PLAN NOTE
Diarrhea improved. Is loose but less frequent. No water or mucous present. IS CONSIDERED NO LONGER INFECTIOUS ONCE STOOL BECOMES SOLID. NO NEED FOR REPEAT STOOL STUDIES FOR CDIFF. Continue metronidazole PO. Currently on day # 6. Contact precautions

## 2017-05-08 NOTE — PLAN OF CARE
Problem: Patient Care Overview  Goal: Plan of Care Review  05/08/2017     Recommendations     Recommendation/Intervention: 1) Encourage po intake 2) Boost Breeze TID 3) Educate patient on nutrient-dense foods 4) Monitor po intake  Goals: 1) Patient will consume >=50% of meals 2) Patient will utilize oral supplement  Nutrition Goal Status: new  Communication of RD Recs: reviewed with DELFINO Gee, MPH, RD, LDN

## 2017-05-08 NOTE — PROGRESS NOTES
"[facility]  Adult Nutrition  Progress Note    SUMMARY     Recommendations    Recommendation/Intervention: 1) Encourage po intake 2) Boost Breeze TID 3) Educate patient on nutrient-dense foods 4) Monitor po intake  Goals: 1) Patient will consume >=50% of meals 2) Patient will utilize oral supplement  Nutrition Goal Status: new  Communication of RD Recs: reviewed with RN    Continuum of Care Plan     D/C Planning:  Patient will consume adequate intake for optimal nutrition with oral supplements as needed.     Reason for Assessment    Reason for Assessment: length of stay  Diagnosis:  (sepsis, elevated troponin, HTN, ETOH abuse, A-Fib)  General Information Comments: Patient does not consume >=50% of her plates per RN documentation. Patient states she has never been a "big eater" and her weight is stable. Denies weight loss, abdominal pain, nausea.     Nutrition Prescription Ordered    Current Diet Order: Regular    Evaluation of Received Nutrients/Fluid Intake       Energy Calories Required: meeting needs  Protein Required: meeting needs  Fluid Required: exceed needs (Net I/O +6696.3)     Tolerance: tolerating     Nutrition Risk Screen     Nutrition Risk Screen: no indicators present    Nutrition/Diet History    Patient Reported Diet/Restrictions/Preferences: general  Typical Food/Fluid Intake: Fruit.   Food Preferences: No cultural, Religion or ethnic food preferences.    Labs/Tests/Procedures/Meds    Pertinent Labs Reviewed: reviewed  Pertinent Labs Comments: Troponin 0.047  BMP  Lab Results   Component Value Date     (L) 05/06/2017    K 3.0 (L) 05/06/2017     05/06/2017    CO2 25 05/06/2017    BUN 4 (L) 05/06/2017    CREATININE 0.8 05/06/2017    CALCIUM 8.8 05/06/2017    ANIONGAP 7 (L) 05/06/2017    ESTGFRAFRICA >60 05/06/2017    EGFRNONAA >60 05/06/2017     Lab Results   Component Value Date    CALCIUM 8.8 05/06/2017    PHOS 3.2 10/11/2012     Lab Results   Component Value Date    " ALBUMIN 3.2 (L) 04/28/2017     Pertinent Medications Reviewed: reviewed  Pertinent Medications Comments: MVI, Thiamin, Folic Acid, statin    Physical Findings    Oral/Mouth Cavity: WDL  Skin: intact    Anthropometrics    Height (inches): 65 in  Weight Method: Stated  Weight (kg): 59 kg  Ideal Body Weight (IBW), Female: 125 lb  % Ideal Body Weight, Female (lb): 104.06 lb  BMI (kg/m2): 21.65  BMI Grade: 18.5-24.9 - normal    Estimated/Assessed Needs    Weight Used For Calorie Calculations: 59 kg (130 lb 1.1 oz)   Height (cm): 165.1 cm  Energy Need Method: Kcal/kg (1475 - 1770)  RMR (Mower-St. Jeor Equation): 1071.6  Weight Used For Protein Calculations: 59 kg (130 lb 1.1 oz)  Protein Requirements: 53 - 70.8   Fluid Need Method: RDA Method, other (see comments) (or per MD)    Nutrition Diagnosis  Nutrition Problem:  Inadequate energy intake  Etiology:  Poor oral intake  Signs/Symptoms:  Patient consuming ~25% of meals  Status:  new      Monitor and Evaluation    Food and Nutrient Intake: energy intake, food and beverage intake  Food and Nutrient Adminstration: diet order, other (specify) (supplement order)    Anthropometric Measurements: weight, weight change, body mass index  Biochemical Data, Medical Tests and Procedures: electrolyte and renal panel, gastrointestinal profile, lipid profile  Nutrition-Focused Physical Findings: overall appearance  % Intake of Estimated Energy Needs: 25 - 50 %  % Meal Intake: 25%    Nutrition Risk    Level of Risk: other (see comments) (f/u 2x/weekly)    Nutrition Follow-Up    RD Follow-up?: Yes

## 2017-05-08 NOTE — PLAN OF CARE
Problem: Physical Therapy Goal  Goal: Physical Therapy Goal  Goals to be met by: 17     Patient will increase functional independence with mobility by performin. Supine to sit with Supervision  2. Rolling to Left and Right with Supervision  3. Sit to stand transfer with Supervision using RW  4. Bed to chair transfer with Supervision using Rolling Walker  5. Gait x 150 feet with Supervision using Rolling Walker   6. Lower extremity exercise program x 20 reps per handout, with supervision   Outcome: Ongoing (interventions implemented as appropriate)  Please assist pt OOB>chair for all meals.  Pt requires min A for bed<>chair transfers.

## 2017-05-08 NOTE — PLAN OF CARE
Problem: Occupational Therapy Goal  Goal: Occupational Therapy Goal  Goals to be met by: 5.10.17     Patient will increase functional independence with ADLs by performing:    Feeding with Set-up Assistance. Gaol met 5/8/17  UE Dressing with Minimal Assistance.  Grooming while seated with Contact Guard Assistance. Goal met 5/8/17  Rolling to Right with Set-up Assistance.   Upper extremity exercise program x10 reps per handout, with assistance as needed.   Outcome: Ongoing (interventions implemented as appropriate)  Pt would benefit from home health OT with 24 hour supervision

## 2017-05-08 NOTE — PT/OT/SLP PROGRESS
Occupational Therapy  Treatment    Sharona Acosta   MRN: [id number]   Admitting Diagnosis: Bacterial pneumonia    OT Date of Treatment: 17   OT Start Time: 1255  OT Stop Time: 1310  OT Total Time (min): 15 min    Billable Minutes:  Self Care/Home Management 15    General Precautions: Standard, fall  Orthopedic Precautions: N/A  Braces: N/A         Subjective:  Communicated with nurse prior to session.    Pain Ratin/10       Pain Rating Post-Intervention: 0/10    Objective:  Patient found with: peripheral IV, telemetry     Functional Mobility:  Bed Mobility:  Rolling/Turning to Left: Contact guard assistance  Rolling/Turning Right: Contact guard assistance    Transfers:          Activities of Daily Living:  Feeding Level of Assistance: Set-up Assistance  Grooming Level of Assistance: Supervision        AM-PAC 6 CLICK ADL   How much help from another person does this patient currently need?   1 = Unable, Total/Dependent Assistance  2 = A lot, Maximum/Moderate Assistance  3 = A little, Minimum/Contact Guard/Supervision  4 = None, Modified [place]/Independent    Putting on and taking off regular lower body clothing? : 1  Bathing (including washing, rinsing, drying)?: 2  Toileting, which includes using toilet, bedpan, or urinal? : 2  Putting on and taking off regular upper body clothing?: 3  Taking care of personal grooming such as brushing teeth?: 3  Eating meals?: 4  Total Score: 15     AM-PAC Raw Score CMS G-Code Modifier Level of Impairment Assistance   6 % Total / Unable   7 - 9 CM 80 - 100% Maximal Assist   10 - 14 CL 60 - 80% Moderate Assist   15 - 19 CK 40 - 60% Moderate Assist   20 - 22 CJ 20 - 40% Minimal Assist   23 CI 1-20% SBA / CGA   24 CH 0% Independent/ Mod I     Patient left supine with all lines intact and call button in reach    ASSESSMENT:  Sharona Acosta is a 81 y.o. female with a medical diagnosis of Bacterial pneumonia and presents with decreased safety awareness.    Rehab  identified problem list/impairments: Rehab identified problem list/impairments: weakness, impaired endurance, impaired functional mobilty, impaired self care skills, gait instability, impaired balance, impaired joint extensibility, impaired muscle length, other (comment)    Rehab potential is fair.    Activity tolerance: Fair    Discharge recommendations: Discharge Facility/Level Of Care Needs: home health OT     Barriers to discharge: Barriers to Discharge: Other (Comment) (decreased safety)    Equipment recommendations: bedside commode     GOALS:   Occupational Therapy Goals        Problem: Occupational Therapy Goal    Goal Priority Disciplines Outcome Interventions   Occupational Therapy Goal     OT, PT/OT Ongoing (interventions implemented as appropriate)    Description:  Goals to be met by: 5.10.17     Patient will increase functional independence with ADLs by performing:    Feeding with Set-up Assistance. Gaol met 5/8/17  UE Dressing with Minimal Assistance.  Grooming while seated with Contact Guard Assistance. Goal met 5/8/17  Rolling to Right with Set-up Assistance.   Upper extremity exercise program x10 reps per handout, with assistance as needed.                 Plan:  Patient to be seen 3 x/week to address the above listed problems via self-care/home management, therapeutic activities, therapeutic exercises  Plan of Care expires: 05/12/17  Plan of Care reviewed with: patient    Susan Garg OT  05/08/2017

## 2017-05-09 PROCEDURE — 25000003 PHARM REV CODE 250: Performed by: EMERGENCY MEDICINE

## 2017-05-09 PROCEDURE — 25000003 PHARM REV CODE 250: Performed by: INTERNAL MEDICINE

## 2017-05-09 PROCEDURE — 97530 THERAPEUTIC ACTIVITIES: CPT

## 2017-05-09 PROCEDURE — 12000002 HC ACUTE/MED SURGE SEMI-PRIVATE ROOM

## 2017-05-09 PROCEDURE — 97116 GAIT TRAINING THERAPY: CPT

## 2017-05-09 PROCEDURE — 63600175 PHARM REV CODE 636 W HCPCS: Performed by: INTERNAL MEDICINE

## 2017-05-09 PROCEDURE — 25000003 PHARM REV CODE 250: Performed by: HOSPITALIST

## 2017-05-09 RX ADMIN — ASPIRIN 81 MG CHEWABLE TABLET 81 MG: 81 TABLET CHEWABLE at 10:05

## 2017-05-09 RX ADMIN — THIAMINE HCL TAB 100 MG 100 MG: 100 TAB at 10:05

## 2017-05-09 RX ADMIN — VERAPAMIL HYDROCHLORIDE 180 MG: 180 TABLET, FILM COATED, EXTENDED RELEASE ORAL at 10:05

## 2017-05-09 RX ADMIN — THERA TABS 1 TABLET: TAB at 10:05

## 2017-05-09 RX ADMIN — METRONIDAZOLE 500 MG: 500 TABLET ORAL at 09:05

## 2017-05-09 RX ADMIN — METRONIDAZOLE 500 MG: 500 TABLET ORAL at 02:05

## 2017-05-09 RX ADMIN — SIMVASTATIN 40 MG: 40 TABLET, FILM COATED ORAL at 09:05

## 2017-05-09 RX ADMIN — FOLIC ACID 1 MG: 1 TABLET ORAL at 10:05

## 2017-05-09 RX ADMIN — METRONIDAZOLE 500 MG: 500 TABLET ORAL at 06:05

## 2017-05-09 RX ADMIN — ENOXAPARIN SODIUM 40 MG: 100 INJECTION SUBCUTANEOUS at 06:05

## 2017-05-09 RX ADMIN — LEVOTHYROXINE SODIUM 75 MCG: 75 TABLET ORAL at 10:05

## 2017-05-09 NOTE — PROGRESS NOTES
JOSE ENRIQUE contacted pt daughter Nona to discuss discharge plans. JOSE ENRIQUE explained to Nona if [employer] does not approve pt for SNF pt will need another discharge plan. JOSE ENRIQUE asked Nona if there is another plan in place for pt if she is unable to discharge to SNF. Per Nona the pt will go back to [facility] [address] and she will look into getting a sitter.

## 2017-05-09 NOTE — NURSING
Pt has remained free from falls and/or signs of infection this shift. Pt has denied pain and will continue to be monitor.

## 2017-05-09 NOTE — NURSING
Report received and patient care assumed. See flow sheet for completed assessment. Telemetry box and monitor #  Is same [id number]. Checked with off going nurse. Good waveform

## 2017-05-09 NOTE — PT/OT/SLP PROGRESS
"Physical Therapy  Treatment    Sharona Acosta   MRN: [id number]   Admitting Diagnosis: Bacterial pneumonia    PT Received On: 17  PT Start Time: 920     PT Stop Time: 946    PT Total Time (min): 26 min       Billable Minutes:  Gait [id number] and Therapeutic Activity 12    Treatment Type: Treatment  PT/PTA: PTA     PTA Visit Number: 1       General Precautions: Standard, fall  Orthopedic Precautions: N/A   Braces: N/A         Subjective:  Communicated with nurse Waite prior to session.  Pt agreeable to therapy.   Pt had a bowl movement however pt stated : " I didn't know that I went "   Pain Ratin/10              Pain Rating Post-Intervention: 0/10    Objective:   Patient found with: telemetry    Functional Mobility:  Bed Mobility: turning/rolling x multiples trials to get clean up and diaper change.    Rolling/Turning to Left: Supervision  Rolling/Turning Right: Supervision  Scooting/Bridging: Stand by Assistance  Supine to Sit: Stand by Assistance    Transfers: from bed, VC's for safety technique.   Sit <> Stand Assistance: Contact Guard Assistance  Sit <> Stand Assistive Device: Rolling Walker  Bed <> Chair Technique: Stand Pivot  Bed <> Chair Assistance: Contact Guard Assistance  Bed <> Chair Assistive Device: Rolling Walker    Gait:   Gait Distance: ~ 120 ft with slow eliza , pt required min A for walker management . VC's for trunk extension during gait training .    Assistance 1: Contact Guard Assistance  Gait Assistive Device: Rolling walker  Gait Pattern: swing-to gait  Gait Deviation(s): decreased eliza, increased time in double stance, decreased velocity of limb motion, decreased step length, decreased toe-to-floor clearance, decreased swing-to-stance ratio, decreased weight-shifting ability, forward lean    Balance:   Static Sit: GOOD-: Takes MODERATE challenges from all directions but inconsistently  Dynamic Sit: FAIR+: Maintains balance through MINIMAL excursions of active trunk " motion  Static Stand: FAIR: Maintains without assist but unable to take challenges  Dynamic stand: FAIR: Needs CONTACT GUARD during gait     Therapeutic Activities and Exercises:  Pt performed bed mobility, transfer and gait training as above.      AM-PAC 6 CLICK MOBILITY  How much help from another person does this patient currently need?   1 = Unable, Total/Dependent Assistance  2 = A lot, Maximum/Moderate Assistance  3 = A little, Minimum/Contact Guard/Supervision  4 = None, Modified [place]/Independent    Turning over in bed (including adjusting bedclothes, sheets and blankets)?: 4  Sitting down on and standing up from a chair with arms (e.g., wheelchair, bedside commode, etc.): 3  Moving from lying on back to sitting on the side of the bed?: 4  Moving to and from a bed to a chair (including a wheelchair)?: 3  Need to walk in hospital room?: 3  Climbing 3-5 steps with a railing?: 3  Total Score: 20    AM-PAC Raw Score CMS G-Code Modifier Level of Impairment Assistance   6 % Total / Unable   7 - 9 CM 80 - 100% Maximal Assist   10 - 14 CL 60 - 80% Moderate Assist   15 - 19 CK 40 - 60% Moderate Assist   20 - 22 CJ 20 - 40% Minimal Assist   23 CI 1-20% SBA / CGA   24 CH 0% Independent/ Mod I     Patient left up in reclined chair with all lines intact, call button in reach, nurse notified and MD present.    Assessment:  Sharona Acosta is a 81 y.o. female with a medical diagnosis of Bacterial pneumonia . Pt required 24 hour supervision  2* confusion upon discharge from hospital . Pt will benefit from further skilled therapy .    Rehab identified problem list/impairments: Rehab identified problem list/impairments: weakness, gait instability, impaired cognition, decreased lower extremity function, decreased safety awareness, decreased coordination, impaired skin    Rehab potential is good.    Activity tolerance: Good    Discharge recommendations:   Discharge Facility/Level Of Care Needs: (Pt will benefit  from further skilled PT services. Pt will need 24 hour supervision.)      Barriers to discharge: Barriers to Discharge: Decreased caregiver support, decreased safety awareness.     Equipment recommendations:   bedside commode     GOALS:   Physical Therapy Goals        Problem: Physical Therapy Goal    Goal Priority Disciplines Outcome Goal Variances Interventions   Physical Therapy Goal     PT/OT, PT Ongoing (interventions implemented as appropriate)     Description:  Goals to be met by: 17     Patient will increase functional independence with mobility by performin. Supine to sit with Supervision  2. Rolling to Left and Right with Supervision  3. Sit to stand transfer with Supervision using RW  4. Bed to chair transfer with Supervision using Rolling Walker  5. Gait  x 150 feet with Supervision using Rolling Walker   6. Lower extremity exercise program x 20 reps per handout, with supervision                PLAN:    Patient to be seen 5 x/week (Mon-Fri)  to address the above listed problems via gait training, therapeutic activities, therapeutic exercises  Plan of Care expires: 17  Plan of Care reviewed with: patient         Dahiana Reyes, PTA  2017

## 2017-05-09 NOTE — SUBJECTIVE & OBJECTIVE
Interval History: no acute events. No BM yet today.    Review of Systems   Respiratory: Negative.    Cardiovascular: Negative.    Gastrointestinal: Negative for diarrhea (loose stools).     Objective:     Vital Signs (Most Recent):  Temp: 98.2 °F (36.8 °C) (05/09/17 0828)  Pulse: 76 (05/09/17 0828)  Resp: 18 (05/09/17 0828)  BP: (!) 141/63 (05/09/17 0828)  SpO2: (!) 94 % (05/09/17 0828) Vital Signs (24h Range):  Temp:  [97.7 °F (36.5 °C)-98.3 °F (36.8 °C)] 98.2 °F (36.8 °C)  Pulse:  [70-77] 76  Resp:  [17-19] 18  SpO2:  [90 %-95 %] 94 %  BP: (115-141)/(56-64) 141/63     Weight: 59 kg (130 lb)  Body mass index is 21.63 kg/(m^2).    Intake/Output Summary (Last 24 hours) at 05/09/17 0954  Last data filed at 05/09/17 0908   Gross per 24 hour   Intake              960 ml   Output                0 ml   Net              960 ml      Physical Exam   Constitutional: She appears well-developed and well-nourished. No distress.   Pleasant   HENT:   Right Ear: External ear normal.   Left Ear: External ear normal.   Nose: Nose normal.   Mouth/Throat: Oropharynx is clear and moist.   Eyes: Conjunctivae and EOM are normal. Pupils are equal, round, and reactive to light. No scleral icterus.   Neck: Normal range of motion. Neck supple. No thyromegaly present.   Cardiovascular: Normal rate.  Exam reveals no friction rub.    Murmur heard.  Pulmonary/Chest: Effort normal and breath sounds normal. No respiratory distress. She has no wheezes. She has no rales.   Abdominal: Soft. Bowel sounds are normal. She exhibits no distension and no mass. There is no tenderness.   No hepatosplenomegaly   Musculoskeletal: Normal range of motion. She exhibits no edema or deformity.   Lymphadenopathy:     She has no cervical adenopathy.   Neurological: She is alert. No cranial nerve deficit.   Oriented to self and place. Follows simple commands and answers questions appropriately   Skin: Skin is warm and dry. No pallor.   Nursing note and vitals  reviewed.      Significant Labs: All pertinent labs within the past 24 hours have been reviewed.    Significant Imaging: I have reviewed all pertinent imaging results/findings within the past 24 hours.  I have reviewed and interpreted all pertinent imaging results/findings within the past 24 hours.

## 2017-05-09 NOTE — PROGRESS NOTES
[facility]  Progress Note    Patient Name: Sharona Acosta  MRN: [id number]  Patient Class: IP- Inpatient   Admission Date: 4/28/2017  Length of Stay: 11 days  Attending Physician: Nicole Rudd MD  Primary Care Provider: Vianey Lee MD        Subjective:     Principal Problem:Bacterial pneumonia    HPI:  Sharona Acosta is a 81 wo woman with alcohol abuse, arthritis, atherosclerosis of the aorta, h/o compression fracture of spine, h/o stroke (1997), hypothyroidism, HLD, essential HTN, osteopenia who presented with her daughter who reports the patient has been experiencing 3 days of progressive weakness and confusion.  She normally ambulates independently but she has not been doing so with her walker over the last 3 days. She had a fall and was taken to [facility] where she was treated for a urinary tract infection w/ IV antibiotics and released 3 days ago. No oral antibiotic continued as an outpatient.  Patient currently resides in an independent living facility and there is concern that she is unable to take care of herself at home at this time.  Report of subjective fever.  The history is otherwise limited due to the condition of the patient.     In the emergency department routine laboratory studies and chest x-ray were obtained.  There is evidence of ill-defined bilateral lower interstitial opacities concerning for healthcare associated pneumonia.  She also had hypokalemia.  Her mental status did not improve while in the emergency department.  She is also noted to have a slightlyelevated troponin level.     Hospital medicine has been asked to admit for further evaluation and treatment.         Hospital Course:  She was found to have HCAP likely bacterial with sepsis based on CXR, leukocytosis, Tmax 100.9, and tachycardia. Empiric abx with vanc and cefepime were started. Blood cultures were collected. She had quick symptomatic improvement. Abx were  deescalated to moxifloxacin on 4/30. This regimen completed while inpatient. Patient also with watery diarrhea confirmed to be CDiff. On metronidazole.   She had a slightly elevated troponin. Cardiology consulted. Echo showed LVEF 55%, +DD, mild-mod AS, and Possible 1.5x1.1 cm mass noted in RA/IVC. CHINTAN on 5/1 to further investigate showed no evidence of RA/IVC/SVC mass (noted on TTE, ?prominent Chiari network vs intraesophageal) and grade 2 atheroma disease of aorta.   PT/OT recommended home health PT/OT on discharge, however patient requires 24 hour assistance that is not provided at her independent living facility, an assisted living facility nor at home unless a sitter would become available which is an out of pocket service. Family had expressed inability to afford this. Seeking SNF vs NH (long-term) as options.diarrhea resolved,no BM yet today.      Interval History: no acute events. No BM yet today.    Review of Systems   Respiratory: Negative.    Cardiovascular: Negative.    Gastrointestinal: Negative for diarrhea (loose stools).     Objective:     Vital Signs (Most Recent):  Temp: 98.2 °F (36.8 °C) (05/09/17 0828)  Pulse: 76 (05/09/17 0828)  Resp: 18 (05/09/17 0828)  BP: (!) 141/63 (05/09/17 0828)  SpO2: (!) 94 % (05/09/17 0828) Vital Signs (24h Range):  Temp:  [97.7 °F (36.5 °C)-98.3 °F (36.8 °C)] 98.2 °F (36.8 °C)  Pulse:  [70-77] 76  Resp:  [17-19] 18  SpO2:  [90 %-95 %] 94 %  BP: (115-141)/(56-64) 141/63     Weight: 59 kg (130 lb)  Body mass index is 21.63 kg/(m^2).    Intake/Output Summary (Last 24 hours) at 05/09/17 0954  Last data filed at 05/09/17 0908   Gross per 24 hour   Intake              960 ml   Output                0 ml   Net              960 ml      Physical Exam   Constitutional: She appears well-developed and well-nourished. No distress.   Pleasant   HENT:   Right Ear: External ear normal.   Left Ear: External ear normal.   Nose: Nose normal.   Mouth/Throat: Oropharynx is clear and moist.    Eyes: Conjunctivae and EOM are normal. Pupils are equal, round, and reactive to light. No scleral icterus.   Neck: Normal range of motion. Neck supple. No thyromegaly present.   Cardiovascular: Normal rate.  Exam reveals no friction rub.    Murmur heard.  Pulmonary/Chest: Effort normal and breath sounds normal. No respiratory distress. She has no wheezes. She has no rales.   Abdominal: Soft. Bowel sounds are normal. She exhibits no distension and no mass. There is no tenderness.   No hepatosplenomegaly   Musculoskeletal: Normal range of motion. She exhibits no edema or deformity.   Lymphadenopathy:     She has no cervical adenopathy.   Neurological: She is alert. No cranial nerve deficit.   Oriented to self and place. Follows simple commands and answers questions appropriately   Skin: Skin is warm and dry. No pallor.   Nursing note and vitals reviewed.      Significant Labs: All pertinent labs within the past 24 hours have been reviewed.    Significant Imaging: I have reviewed all pertinent imaging results/findings within the past 24 hours.  I have reviewed and interpreted all pertinent imaging results/findings within the past 24 hours.    Assessment/Plan:      * Bacterial pneumonia  Chest x-ray notable for ill-defined bilateral lower lung interstitial opacities, concerning for pneumonia. Initially met sepsis criteria, now resolved.  D/C from [facility] recently. Chest x-ray indicates possible underlying history of pulmonary fibrosis. Changed moxi to PO. Abx treatment completed. Currently stable at room air    Hypertension, benign  Goal while inpatient is a systolic blood pressure less than 160mmHg. BP currently at goal. Continue verapamil    Hyperlipidemia  Statin      Hypothyroid  Continued current home regimen. TFT with low T3 but otherwise normal.    H/O: stroke  Stable. Cont ASA and statin.    Mild cognitive impairment  It is mild. Patient is alert and follows all commands. Able to carry out  conversation without issues.     Alcohol use  It does not appear to be an issue as she has not shown signs of withdrawal. Thiamine, folic acid and MVI    Elevated troponin  Likely cardiac strain associated with increased metabolic demand with acute infection and sepsis. Is asymptomatic. Echo with normal LV function and no wall motion abnormalities. Did show an intracardiac mass. Unknown cause or etiology. Currently not causing any symptoms. To be re-evaluated as outpatient. MPI as outpatinent. Appreciate cardiology recs.     Nonrheumatic aortic valve stenosis  Mild-mod    Abnormal echocardiogram findings without diagnosis  CHINTAN as above    Physical deconditioning  Patient at independent living facility prior to admission. It appears patient requires 24-hour assistance and skilled PT. No 24-hour assistance currently offered at either independent nor assisted living facility. No 24-hour assistance available at home. Consulted for SNF placement      Clostridium difficile infection  Diarrhea improved. Is loose but less frequent. No water or mucous present. IS CONSIDERED NO LONGER INFECTIOUS ONCE STOOL BECOMES SOLID. NO NEED FOR REPEAT STOOL STUDIES FOR CDIFF. Continue metronidazole PO. Currently on day # 7. Contact precautions,no BM today yet.      VTE Risk Mitigation         Ordered     enoxaparin injection 40 mg  Daily     Route:  Subcutaneous        05/02/17 1735     Medium Risk of VTE  Once      04/29/17 0141     Place EVERETTE hose  Until discontinued      04/29/17 0141     Place sequential compression device  Until discontinued      04/29/17 0141          Nicole Rudd MD  Department of Hospital Medicine   [facility]

## 2017-05-09 NOTE — ASSESSMENT & PLAN NOTE
Diarrhea improved. Is loose but less frequent. No water or mucous present. IS CONSIDERED NO LONGER INFECTIOUS ONCE STOOL BECOMES SOLID. NO NEED FOR REPEAT STOOL STUDIES FOR CDIFF. Continue metronidazole PO. Currently on day # 7. Contact precautions,no BM today yet.

## 2017-05-09 NOTE — ASSESSMENT & PLAN NOTE
Chest x-ray notable for ill-defined bilateral lower lung interstitial opacities, concerning for pneumonia. Initially met sepsis criteria, now resolved.  D/C from [facility] recently. Chest x-ray indicates possible underlying history of pulmonary fibrosis. Changed moxi to PO. Abx treatment completed. Currently stable at room air

## 2017-05-09 NOTE — PLAN OF CARE
Problem: Patient Care Overview  Goal: Plan of Care Review  Outcome: Ongoing (interventions implemented as appropriate)    05/09/17 3380   Coping/Psychosocial   Plan Of Care Reviewed With patient    Perineum  Dermatis noted from moisture. Cleanse with soap and water. Pad dry and applied moisture barrier. Turned every 2 hours and prn. Flagyl given as ordered. C diff precautions continues. Fall prevention also continues. Denies pain. Supplement given for poor appetite

## 2017-05-10 PROCEDURE — 25000003 PHARM REV CODE 250: Performed by: INTERNAL MEDICINE

## 2017-05-10 PROCEDURE — 97535 SELF CARE MNGMENT TRAINING: CPT

## 2017-05-10 PROCEDURE — 12000002 HC ACUTE/MED SURGE SEMI-PRIVATE ROOM

## 2017-05-10 PROCEDURE — 63600175 PHARM REV CODE 636 W HCPCS: Performed by: INTERNAL MEDICINE

## 2017-05-10 PROCEDURE — 25000003 PHARM REV CODE 250: Performed by: HOSPITALIST

## 2017-05-10 PROCEDURE — 97110 THERAPEUTIC EXERCISES: CPT

## 2017-05-10 PROCEDURE — 25000003 PHARM REV CODE 250: Performed by: EMERGENCY MEDICINE

## 2017-05-10 PROCEDURE — 97116 GAIT TRAINING THERAPY: CPT

## 2017-05-10 RX ORDER — DOXYLAMINE SUCCINATE 25 MG
TABLET ORAL 2 TIMES DAILY
Status: DISCONTINUED | OUTPATIENT
Start: 2017-05-10 | End: 2017-05-12 | Stop reason: HOSPADM

## 2017-05-10 RX ADMIN — MICONAZOLE NITRATE: 20 CREAM TOPICAL at 09:05

## 2017-05-10 RX ADMIN — FOLIC ACID 1 MG: 1 TABLET ORAL at 09:05

## 2017-05-10 RX ADMIN — METRONIDAZOLE 500 MG: 500 TABLET ORAL at 02:05

## 2017-05-10 RX ADMIN — LEVOTHYROXINE SODIUM 75 MCG: 75 TABLET ORAL at 09:05

## 2017-05-10 RX ADMIN — THIAMINE HCL TAB 100 MG 100 MG: 100 TAB at 09:05

## 2017-05-10 RX ADMIN — SIMVASTATIN 40 MG: 40 TABLET, FILM COATED ORAL at 09:05

## 2017-05-10 RX ADMIN — METRONIDAZOLE 500 MG: 500 TABLET ORAL at 05:05

## 2017-05-10 RX ADMIN — ASPIRIN 81 MG CHEWABLE TABLET 81 MG: 81 TABLET CHEWABLE at 09:05

## 2017-05-10 RX ADMIN — THERA TABS 1 TABLET: TAB at 09:05

## 2017-05-10 RX ADMIN — VERAPAMIL HYDROCHLORIDE 180 MG: 180 TABLET, FILM COATED, EXTENDED RELEASE ORAL at 09:05

## 2017-05-10 RX ADMIN — METRONIDAZOLE 500 MG: 500 TABLET ORAL at 09:05

## 2017-05-10 RX ADMIN — ENOXAPARIN SODIUM 40 MG: 100 INJECTION SUBCUTANEOUS at 06:05

## 2017-05-10 NOTE — UM SECONDARY REVIEW
Medical Affairs    IP Extended Stay > 10       82 yo female w/ HCAP likely bacteria sepsis. She had a slightly elevated troponin. Cardiology consulted. Echo showed LVEF 55%, +DD, mild-mod AS, and Possible 1.5x1.1 cm mass noted in RA/IVC. CHINTAN on 5/1 to further investigate showed no evidence of RA/IVC/SVC mass (noted on TTE, ?prominent Chiari network vs intraesophageal) and grade 2 atheroma disease of aorta. Pt also w/ diarrhea and tested positive for c.diff. pt lives at independent living facility and now needs 24hrs care. PT recs HH PT on d/c. difficulty placing due to c.diff, but [facility] did accept. will try for SNF, but most likely [employer] wont approve. Avoidable days entered . Cannot go to NH bc family cannot afford. Pt owes a very large amount of money to IRS. Still waiting on [employer] response about SNF. If SNF not approved will dc  back to independent living w/ sitters.     LOS: approved an agreement with D/C plan

## 2017-05-10 NOTE — PLAN OF CARE
Problem: Patient Care Overview  Goal: Plan of Care Review  Outcome: Ongoing (interventions implemented as appropriate)  AO x4, VSS and afebrile. Up in chair for morning. Denies pain or SOB. Diaper dermatitis tx with medicine cream. Bm today semi form with liquid present, dark brown. Contact isolation maintained. Awaiting placement.

## 2017-05-10 NOTE — PT/OT/SLP PROGRESS
Occupational Therapy  Treatment    Sharona Acosta   MRN: [id number]   Admitting Diagnosis: Bacterial pneumonia    OT Date of Treatment: 05/10/17   OT Start Time: 08  OT Stop Time: 825  OT Total Time (min): 25 min    Billable Minutes:  Self Care/Home Management 25    General Precautions: Standard, fall, contact  Orthopedic Precautions: N/A  Braces: N/A         Subjective:  Communicated with nurse prior to session.    Pain Ratin/10              Pain Rating Post-Intervention: 0/10    Objective:  Patient found with: telemetry, peripheral IV     Functional Mobility:  Bed Mobility:  Scooting/Bridging: Supervision  Supine to Sit: Supervision (with head of bed flat)    Transfers:   Sit <> Stand Assistance: Contact Guard Assistance  Sit <> Stand Assistive Device: Rolling Walker  Bed <> Chair Technique: Stand Pivot  Bed <> Chair Transfer Assistance: Contact Guard Assistance    Functional Ambulation: pt able to ambulate to bedside chair    Activities of Daily Living:  Feeding Level of Assistance: Stand by assistance  UE Dressing Level of Assistance: Minimum assistance  LE Dressing Level of Assistance: Supervision  Grooming Position: Seated  Grooming Level of Assistance: Supervision   Balance:   Static Sit: GOOD-: Takes MODERATE challenges from all directions but inconsistently  Dynamic Sit: GOOD-: Maintains balance through MODERATE excursions of active trunk movement,     Static Stand: POOR+: Needs MINIMAL assist to maintain  Dynamic stand: FAIR: Needs CONTACT GUARD during gait    AM-PAC 6 CLICK ADL   How much help from another person does this patient currently need?   1 = Unable, Total/Dependent Assistance  2 = A lot, Maximum/Moderate Assistance  3 = A little, Minimum/Contact Guard/Supervision  4 = None, Modified [place]/Independent    Putting on and taking off regular lower body clothing? : 3  Bathing (including washing, rinsing, drying)?: 2  Toileting, which includes using toilet, bedpan, or urinal? : 2  Putting  on and taking off regular upper body clothing?: 3  Taking care of personal grooming such as brushing teeth?: 3  Eating meals?: 3  Total Score: 16     AM-PAC Raw Score CMS G-Code Modifier Level of Impairment Assistance   6 % Total / Unable   7 - 9 CM 80 - 100% Maximal Assist   10 - 14 CL 60 - 80% Moderate Assist   15 - 19 CK 40 - 60% Moderate Assist   20 - 22 CJ 20 - 40% Minimal Assist   23 CI 1-20% SBA / CGA   24 CH 0% Independent/ Mod I     Patient left up in chair with all lines intact, call button in reach and nurse notified    ASSESSMENT:  Sharona Acosta is a 81 y.o. female with a medical diagnosis of Bacterial pneumonia and presents with pt decreased safety awareness. Pt continues to require 24 hour supervision.    Rehab identified problem list/impairments: Rehab identified problem list/impairments: weakness, impaired endurance, impaired functional mobilty, impaired self care skills, decreased upper extremity function, impaired muscle length, impaired joint extensibility    Rehab potential is fair.    Activity tolerance: Good    Discharge recommendations: Discharge Facility/Level Of Care Needs: home health OT     Barriers to discharge: Barriers to Discharge: Other (Comment) (decreased safety)    Equipment recommendations: bedside commode     GOALS:   Occupational Therapy Goals        Problem: Occupational Therapy Goal    Goal Priority Disciplines Outcome Interventions   Occupational Therapy Goal     OT, PT/OT Ongoing (interventions implemented as appropriate)    Description:  Goals to be met by: 5.10.17     Patient will increase functional independence with ADLs by performing:    Feeding with Set-up Assistance. Gaol met 5/8/17  UE Dressing with Minimal Assistance. Goal met 5/10/17  Grooming while seated with Contact Guard Assistance. Goal met 5/8/17  Rolling to Right with Set-up Assistance. Goal met 5/10/17  Upper extremity exercise program x10 reps per handout, with assistance as needed.                   Plan:  Patient to be seen 3 x/week to address the above listed problems via self-care/home management, therapeutic activities, therapeutic exercises  Plan of Care expires: 05/12/17  Plan of Care reviewed with: patient         Susan Garg OT  05/10/2017

## 2017-05-10 NOTE — PLAN OF CARE
Problem: Occupational Therapy Goal  Goal: Occupational Therapy Goal  Goals to be met by: 5.10.17     Patient will increase functional independence with ADLs by performing:    Feeding with Set-up Assistance. Gaol met 5/8/17  UE Dressing with Minimal Assistance. Goal met 5/10/17  Grooming while seated with Contact Guard Assistance. Goal met 5/8/17  Rolling to Right with Set-up Assistance. Goal met 5/10/17  Upper extremity exercise program x10 reps per handout, with assistance as needed.   Outcome: Ongoing (interventions implemented as appropriate)  Pt needs 24 hour supervision.

## 2017-05-10 NOTE — PROGRESS NOTES
[facility]  [place] Note    Patient Name: Sharona Acosta  MRN: [id number]  Patient Class: IP- Inpatient   Admission Date: 4/28/2017  Length of Stay: 12 days  Attending Physician: Nicole Rudd MD  Primary Care Provider: Vianey Lee MD        Subjective:     Principal Problem:Bacterial pneumonia    HPI:  Sharona Acosta is a 81 wo woman with alcohol abuse, arthritis, atherosclerosis of the aorta, h/o compression fracture of spine, h/o stroke (1997), hypothyroidism, HLD, essential HTN, osteopenia who presented with her daughter who reports the patient has been experiencing 3 days of progressive weakness and confusion.  She normally ambulates independently but she has not been doing so with her walker over the last 3 days. She had a fall and was taken to [facility] where she was treated for a urinary tract infection w/ IV antibiotics and released 3 days ago. No oral antibiotic continued as an outpatient.  Patient currently resides in an independent living facility and there is concern that she is unable to take care of herself at home at this time.  Report of subjective fever.  The history is otherwise limited due to the condition of the patient.     In the emergency department routine laboratory studies and chest x-ray were obtained.  There is evidence of ill-defined bilateral lower interstitial opacities concerning for healthcare associated pneumonia.  She also had hypokalemia.  Her mental status did not improve while in the emergency department.  She is also noted to have a slightlyelevated troponin level.     Hospital medicine has been asked to admit for further evaluation and treatment.         Hospital Course:  She was found to have HCAP likely bacterial with sepsis based on CXR, leukocytosis, Tmax 100.9, and tachycardia. Empiric abx with vanc and cefepime were started. Blood cultures were collected. She had quick symptomatic improvement. Abx were  deescalated to moxifloxacin on 4/30. This regimen completed while inpatient. Patient also with watery diarrhea confirmed to be CDiff. On metronidazole.   She had a slightly elevated troponin. Cardiology consulted. Echo showed LVEF 55%, +DD, mild-mod AS, and Possible 1.5x1.1 cm mass noted in RA/IVC. CHINTAN on 5/1 to further investigate showed no evidence of RA/IVC/SVC mass (noted on TTE, ?prominent Chiari network vs intraesophageal) and grade 2 atheroma disease of aorta.   PT/OT recommended home health PT/OT on discharge, however patient requires 24 hour assistance that is not provided at her independent living facility, an assisted living facility nor at home unless a sitter would become available which is an out of pocket service. Family had expressed inability to afford this. Seeking SNF vs NH (group home) as options.diarrhea resolved,had solid BM today.      Interval History: no acute events. Had solid BM today.    Review of Systems   Respiratory: Negative.    Cardiovascular: Negative.    Gastrointestinal: Negative for diarrhea (loose stools).     Objective:     Vital Signs (Most Recent):  Temp: 98.3 °F (36.8 °C) (05/10/17 0743)  Pulse: 75 (05/10/17 0743)  Resp: 18 (05/10/17 0743)  BP: 135/64 (05/10/17 0743)  SpO2: (!) 94 % (05/10/17 0743) Vital Signs (24h Range):  Temp:  [97.9 °F (36.6 °C)-98.5 °F (36.9 °C)] 98.3 °F (36.8 °C)  Pulse:  [72-82] 75  Resp:  [17-18] 18  SpO2:  [91 %-95 %] 94 %  BP: (123-142)/(58-65) 135/64     Weight: 59 kg (130 lb)  Body mass index is 21.63 kg/(m^2).    Intake/Output Summary (Last 24 hours) at 05/10/17 1127  Last data filed at 05/10/17 0904   Gross per 24 hour   Intake              840 ml   Output                0 ml   Net              840 ml      Physical Exam   Constitutional: She appears well-developed and well-nourished. No distress.   Pleasant   HENT:   Right Ear: External ear normal.   Left Ear: External ear normal.   Nose: Nose normal.   Mouth/Throat: Oropharynx is clear and  moist.   Eyes: Conjunctivae and EOM are normal. Pupils are equal, round, and reactive to light. No scleral icterus.   Neck: Normal range of motion. Neck supple. No thyromegaly present.   Cardiovascular: Normal rate.  Exam reveals no friction rub.    Murmur heard.  Pulmonary/Chest: Effort normal and breath sounds normal. No respiratory distress. She has no wheezes. She has no rales.   Abdominal: Soft. Bowel sounds are normal. She exhibits no distension and no mass. There is no tenderness.   No hepatosplenomegaly   Musculoskeletal: Normal range of motion. She exhibits no edema or deformity.   Lymphadenopathy:     She has no cervical adenopathy.   Neurological: She is alert. No cranial nerve deficit.   Oriented to self and place. Follows simple commands and answers questions appropriately   Skin: Skin is warm and dry. No pallor.   Nursing note and vitals reviewed.      Significant Labs: All pertinent labs within the past 24 hours have been reviewed.    Significant Imaging: I have reviewed all pertinent imaging results/findings within the past 24 hours.  I have reviewed and interpreted all pertinent imaging results/findings within the past 24 hours.    Assessment/Plan:      * Bacterial pneumonia  Chest x-ray notable for ill-defined bilateral lower lung interstitial opacities, concerning for pneumonia. Initially met sepsis criteria, now resolved.  D/C from [facility] recently. Chest x-ray indicates possible underlying history of pulmonary fibrosis. Changed moxi to PO. Abx treatment completed. Currently stable at room air    Hypertension, benign  Goal while inpatient is a systolic blood pressure less than 160mmHg. BP currently at goal. Continue verapamil    Hyperlipidemia  Statin      Hypothyroid  Continued current home regimen. TFT with low T3 but otherwise normal.    H/O: stroke  Stable. Cont ASA and statin.    Mild cognitive impairment  It is mild. Patient is alert and follows all commands. Able to carry  out conversation without issues.     Alcohol use  It does not appear to be an issue as she has not shown signs of withdrawal. Thiamine, folic acid and MVI    Elevated troponin  Likely cardiac strain associated with increased metabolic demand with acute infection and sepsis. Is asymptomatic. Echo with normal LV function and no wall motion abnormalities. Did show an intracardiac mass. Unknown cause or etiology. Currently not causing any symptoms. To be re-evaluated as outpatient. MPI as outpatinent. Appreciate cardiology recs.     Nonrheumatic aortic valve stenosis  Mild-mod    Abnormal echocardiogram findings without diagnosis  CHINTAN as above    Physical deconditioning  Patient at independent living facility prior to admission. It appears patient requires 24-hour assistance and skilled PT. No 24-hour assistance currently offered at either independent nor assisted living facility. No 24-hour assistance available at home. Consulted for SNF placement      Clostridium difficile infection  Diarrhea improved. Is loose but less frequent. No water or mucous present. IS CONSIDERED NO LONGER INFECTIOUS ONCE STOOL BECOMES SOLID. NO NEED FOR REPEAT STOOL STUDIES FOR CDIFF. Continue metronidazole PO. Currently on day # 7. Contact precautions,had solid BM today.      VTE Risk Mitigation         Ordered     enoxaparin injection 40 mg  Daily     Route:  Subcutaneous        05/02/17 1735     Medium Risk of VTE  Once      04/29/17 0141     Place EVERETTE hose  Until discontinued      04/29/17 0141     Place sequential compression device  Until discontinued      04/29/17 0141          Nicole Rudd MD  Department of Hospital Medicine   [facility]

## 2017-05-10 NOTE — SUBJECTIVE & OBJECTIVE
Interval History: no acute events. Had solid BM today.    Review of Systems   Respiratory: Negative.    Cardiovascular: Negative.    Gastrointestinal: Negative for diarrhea (loose stools).     Objective:     Vital Signs (Most Recent):  Temp: 98.3 °F (36.8 °C) (05/10/17 0743)  Pulse: 75 (05/10/17 0743)  Resp: 18 (05/10/17 0743)  BP: 135/64 (05/10/17 0743)  SpO2: (!) 94 % (05/10/17 0743) Vital Signs (24h Range):  Temp:  [97.9 °F (36.6 °C)-98.5 °F (36.9 °C)] 98.3 °F (36.8 °C)  Pulse:  [72-82] 75  Resp:  [17-18] 18  SpO2:  [91 %-95 %] 94 %  BP: (123-142)/(58-65) 135/64     Weight: 59 kg (130 lb)  Body mass index is 21.63 kg/(m^2).    Intake/Output Summary (Last 24 hours) at 05/10/17 1127  Last data filed at 05/10/17 0904   Gross per 24 hour   Intake              840 ml   Output                0 ml   Net              840 ml      Physical Exam   Constitutional: She appears well-developed and well-nourished. No distress.   Pleasant   HENT:   Right Ear: External ear normal.   Left Ear: External ear normal.   Nose: Nose normal.   Mouth/Throat: Oropharynx is clear and moist.   Eyes: Conjunctivae and EOM are normal. Pupils are equal, round, and reactive to light. No scleral icterus.   Neck: Normal range of motion. Neck supple. No thyromegaly present.   Cardiovascular: Normal rate.  Exam reveals no friction rub.    Murmur heard.  Pulmonary/Chest: Effort normal and breath sounds normal. No respiratory distress. She has no wheezes. She has no rales.   Abdominal: Soft. Bowel sounds are normal. She exhibits no distension and no mass. There is no tenderness.   No hepatosplenomegaly   Musculoskeletal: Normal range of motion. She exhibits no edema or deformity.   Lymphadenopathy:     She has no cervical adenopathy.   Neurological: She is alert. No cranial nerve deficit.   Oriented to self and place. Follows simple commands and answers questions appropriately   Skin: Skin is warm and dry. No pallor.   Nursing note and vitals  reviewed.      Significant Labs: All pertinent labs within the past 24 hours have been reviewed.    Significant Imaging: I have reviewed all pertinent imaging results/findings within the past 24 hours.  I have reviewed and interpreted all pertinent imaging results/findings within the past 24 hours.

## 2017-05-10 NOTE — ASSESSMENT & PLAN NOTE
Chest x-ray notable for ill-defined bilateral lower lung interstitial opacities, concerning for pneumonia. Initially met sepsis criteria, now resolved.  D/C from [facility] recently. Chest x-ray indicates possible underlying history of pulmonary fibrosis. Changed moxi to PO. Abx treatment completed. Currently stable at room air

## 2017-05-10 NOTE — PLAN OF CARE
05/10/17 1557   Discharge Reassessment   Assessment Type Discharge Planning Reassessment   Discharge Plan A Skilled Nursing Facility   Discharge Plan B Home with family;Home Health   Change in patient condition or support system No   Patient choice form signed by patient/caregiver N/A   Explained to the the patient/caregiver why the discharge planned changed: Yes   Involved the patient/caregiver in establishing a new discharge plan: Yes     The plan remains the same for discharge. The goal for pt is to transfer to NH SNF at [facility] if approved by [employer].

## 2017-05-10 NOTE — ASSESSMENT & PLAN NOTE
Diarrhea improved. Is loose but less frequent. No water or mucous present. IS CONSIDERED NO LONGER INFECTIOUS ONCE STOOL BECOMES SOLID. NO NEED FOR REPEAT STOOL STUDIES FOR CDIFF. Continue metronidazole PO. Currently on day # 7. Contact precautions,had solid BM today.

## 2017-05-10 NOTE — PT/OT/SLP PROGRESS
Physical Therapy  Treatment    Sharona Acosta   MRN: [id number]   Admitting Diagnosis: Bacterial pneumonia    PT Received On: 05/10/17  PT Start Time: 928     PT Stop Time: 952    PT Total Time (min): 24 min       Billable Minutes:  Gait [id number] and Therapeutic Exercise 12    Treatment Type: Treatment  PT/PTA: PTA     PTA Visit Number: 2       General Precautions: Standard, fall, contact  Orthopedic Precautions: N/A   Braces: N/A         Subjective:  Communicated with nurse Poly prior to session.  Pt agreeable to therapy.     Pain Ratin/10              Pain Rating Post-Intervention: 0/10    Objective:   Patient found with: telemetry    Functional Mobility:  Bed Mobility:    pt found up in bedside chair.     Transfers: VC's for safety technique and walker management.   Sit <> Stand Assistance: Contact Guard Assistance  Sit <> Stand Assistive Device: Rolling Walker    Gait:   Gait Distance: 100 ft with slow eliza . Pt required min A for walker management. VC's for trunk extension .   Assistance 1: Contact Guard Assistance  Gait Assistive Device: Rolling walker  Gait Pattern: reciprocal  Gait Deviation(s): decreased eliza, decreased velocity of limb motion, decreased step length, decreased swing-to-stance ratio, decreased toe-to-floor clearance, decreased weight-shifting ability, forward lean    Balance:   Static Sit: GOOD-: Takes MODERATE challenges from all directions but inconsistently  Dynamic Sit: FAIR+: Maintains balance through MINIMAL excursions of active trunk motion  Static Stand: FAIR: Maintains without assist but unable to take challenges  Dynamic stand: FAIR: Needs CONTACT GUARD during gait     Therapeutic Activities and Exercises:  Pt performed seated BLE x 20 reps: heel/toes raises, LAQ ,HS and BLE x 15 reps: hip flexion and pillow squeezes .VC's for proper technique and sequence. Pt tolerated well.      AM-PAC 6 CLICK MOBILITY  How much help from another person does this patient currently  need?   1 = Unable, Total/Dependent Assistance  2 = A lot, Maximum/Moderate Assistance  3 = A little, Minimum/Contact Guard/Supervision  4 = None, Modified [place]/Independent    Turning over in bed (including adjusting bedclothes, sheets and blankets)?: 4  Sitting down on and standing up from a chair with arms (e.g., wheelchair, bedside commode, etc.): 3  Moving from lying on back to sitting on the side of the bed?: 4  Moving to and from a bed to a chair (including a wheelchair)?: 3  Need to walk in hospital room?: 3  Climbing 3-5 steps with a railing?: 3  Total Score: 20    AM-PAC Raw Score CMS G-Code Modifier Level of Impairment Assistance   6 % Total / Unable   7 - 9 CM 80 - 100% Maximal Assist   10 - 14 CL 60 - 80% Moderate Assist   15 - 19 CK 40 - 60% Moderate Assist   20 - 22 CJ 20 - 40% Minimal Assist   23 CI 1-20% SBA / CGA   24 CH 0% Independent/ Mod I     Patient left up in chair with all lines intact, call button in reach and nurse notified.    Assessment:  Sharona Acosta is a 81 y.o. female with a medical diagnosis of Bacterial pneumonia .     Rehab identified problem list/impairments: Rehab identified problem list/impairments: weakness, gait instability, impaired cognition, decreased lower extremity function, decreased safety awareness, decreased upper extremity function, decreased coordination    Rehab potential is good.    Activity tolerance: Good    Discharge recommendations:   Discharge Facility/Level Of Care Needs: (Pt will benefit from further skilled PT services. Pt will need 24 hour supervision.)      Barriers to discharge: Barriers to Discharge: Decreased caregiver support, decreased safety awareness .     Equipment recommendations:   bedside commode    GOALS:   Physical Therapy Goals        Problem: Physical Therapy Goal    Goal Priority Disciplines Outcome Goal Variances Interventions   Physical Therapy Goal     PT/OT, PT Ongoing (interventions implemented as appropriate)      Description:  Goals to be met by: 17     Patient will increase functional independence with mobility by performin. Supine to sit with Supervision  2. Rolling to Left and Right with Supervision  3. Sit to stand transfer with Supervision using RW  4. Bed to chair transfer with Supervision using Rolling Walker  5. Gait  x 150 feet with Supervision using Rolling Walker   6. Lower extremity exercise program x 20 reps per handout, with supervision                PLAN:    Patient to be seen 5 x/week (Mon-Fri)  to address the above listed problems via gait training, therapeutic activities, therapeutic exercises  Plan of Care expires: 17  Plan of Care reviewed with: patient         Dahiana Reyes, PTA  05/10/2017

## 2017-05-11 LAB
ANION GAP SERPL CALC-SCNC: 8 MMOL/L
BUN SERPL-MCNC: 4 MG/DL
CALCIUM SERPL-MCNC: 8.6 MG/DL
CHLORIDE SERPL-SCNC: 102 MMOL/L
CO2 SERPL-SCNC: 26 MMOL/L
CREAT SERPL-MCNC: 0.7 MG/DL
EST. GFR  (AFRICAN AMERICAN): >60 ML/MIN/1.73 M^2
EST. GFR  (NON AFRICAN AMERICAN): >60 ML/MIN/1.73 M^2
GLUCOSE SERPL-MCNC: 91 MG/DL
MAGNESIUM SERPL-MCNC: 1.4 MG/DL
POTASSIUM SERPL-SCNC: 3.6 MMOL/L
SODIUM SERPL-SCNC: 136 MMOL/L

## 2017-05-11 PROCEDURE — 25000003 PHARM REV CODE 250: Performed by: INTERNAL MEDICINE

## 2017-05-11 PROCEDURE — G8980 MOBILITY D/C STATUS: HCPCS | Mod: CJ

## 2017-05-11 PROCEDURE — 12000002 HC ACUTE/MED SURGE SEMI-PRIVATE ROOM

## 2017-05-11 PROCEDURE — 63600175 PHARM REV CODE 636 W HCPCS: Performed by: INTERNAL MEDICINE

## 2017-05-11 PROCEDURE — 80048 BASIC METABOLIC PNL TOTAL CA: CPT

## 2017-05-11 PROCEDURE — 36415 COLL VENOUS BLD VENIPUNCTURE: CPT

## 2017-05-11 PROCEDURE — 97116 GAIT TRAINING THERAPY: CPT

## 2017-05-11 PROCEDURE — 25000003 PHARM REV CODE 250: Performed by: HOSPITALIST

## 2017-05-11 PROCEDURE — G8978 MOBILITY CURRENT STATUS: HCPCS | Mod: CJ

## 2017-05-11 PROCEDURE — 25000003 PHARM REV CODE 250: Performed by: EMERGENCY MEDICINE

## 2017-05-11 PROCEDURE — 94761 N-INVAS EAR/PLS OXIMETRY MLT: CPT

## 2017-05-11 PROCEDURE — 83735 ASSAY OF MAGNESIUM: CPT

## 2017-05-11 PROCEDURE — G8979 MOBILITY GOAL STATUS: HCPCS | Mod: CI

## 2017-05-11 PROCEDURE — 97535 SELF CARE MNGMENT TRAINING: CPT

## 2017-05-11 RX ORDER — LANOLIN ALCOHOL/MO/W.PET/CERES
400 CREAM (GRAM) TOPICAL DAILY
Status: DISCONTINUED | OUTPATIENT
Start: 2017-05-11 | End: 2017-05-12 | Stop reason: HOSPADM

## 2017-05-11 RX ADMIN — METRONIDAZOLE 500 MG: 500 TABLET ORAL at 05:05

## 2017-05-11 RX ADMIN — LEVOTHYROXINE SODIUM 75 MCG: 75 TABLET ORAL at 09:05

## 2017-05-11 RX ADMIN — ENOXAPARIN SODIUM 40 MG: 100 INJECTION SUBCUTANEOUS at 05:05

## 2017-05-11 RX ADMIN — MAGNESIUM OXIDE TAB 400 MG (241.3 MG ELEMENTAL MG) 400 MG: 400 (241.3 MG) TAB at 02:05

## 2017-05-11 RX ADMIN — SIMVASTATIN 40 MG: 40 TABLET, FILM COATED ORAL at 09:05

## 2017-05-11 RX ADMIN — MICONAZOLE NITRATE: 20 CREAM TOPICAL at 09:05

## 2017-05-11 RX ADMIN — FOLIC ACID 1 MG: 1 TABLET ORAL at 09:05

## 2017-05-11 RX ADMIN — METRONIDAZOLE 500 MG: 500 TABLET ORAL at 02:05

## 2017-05-11 RX ADMIN — METRONIDAZOLE 500 MG: 500 TABLET ORAL at 09:05

## 2017-05-11 RX ADMIN — THERA TABS 1 TABLET: TAB at 09:05

## 2017-05-11 RX ADMIN — ASPIRIN 81 MG CHEWABLE TABLET 81 MG: 81 TABLET CHEWABLE at 09:05

## 2017-05-11 RX ADMIN — MICONAZOLE NITRATE: 20 CREAM TOPICAL at 01:05

## 2017-05-11 RX ADMIN — THIAMINE HCL TAB 100 MG 100 MG: 100 TAB at 09:05

## 2017-05-11 RX ADMIN — VERAPAMIL HYDROCHLORIDE 180 MG: 180 TABLET, FILM COATED, EXTENDED RELEASE ORAL at 09:05

## 2017-05-11 NOTE — PT/OT/SLP PROGRESS
Physical Therapy  Treatment    Sharona Acosta   MRN: [id number]   Admitting Diagnosis: Bacterial pneumonia    PT Received On: 17  PT Start Time: 1515     PT Stop Time: 1529    PT Total Time (min): 14 min       Billable Minutes:  Gait [id number]    Treatment Type: Treatment  PT/PTA: PTA     PTA Visit Number: 3       General Precautions: Standard, fall, contact  Orthopedic Precautions: N/A   Braces: N/A         Subjective:  Communicated with nurse Cintron prior to session.  Pt agreeable to gait training.     Pain Ratin/10              Pain Rating Post-Intervention: 0/10    Objective:   Patient found with: telemetry    Functional Mobility:  Bed Mobility:   Rolling/Turning to Left: Supervision  Scooting/Bridging: Stand by Assistance  Supine to Sit: Stand by Assistance    Transfers: VC's for safety technique.   Sit <> Stand Assistance: Stand By Assistance  Sit <> Stand Assistive Device: Rolling Walker    Gait:   Gait Distance: ~ 80 ft , VC's for safety technique and walker managmement. Pt's c/o dizziness at the end of gait training however feels better upon sitting.   Assistance 1: Contact Guard Assistance  Gait Assistive Device: Rolling walker  Gait Pattern: reciprocal  Gait Deviation(s): decreased eliza, decreased velocity of limb motion, decreased step length, decreased swing-to-stance ratio, forward lean      Balance:   Static Sit: GOOD-: Takes MODERATE challenges from all directions but inconsistently  Dynamic Sit: FAIR+: Maintains balance through MINIMAL excursions of active trunk motion  Static Stand: FAIR: Maintains without assist but unable to take challenges  Dynamic stand: FAIR: Needs CONTACT GUARD during gait     Therapeutic Activities and Exercises:  Pt performed bed mobility , transfer and gait training as above.      AM-PAC 6 CLICK MOBILITY  How much help from another person does this patient currently need?   1 = Unable, Total/Dependent Assistance  2 = A lot, Maximum/Moderate Assistance  3 = A  little, Minimum/Contact Guard/Supervision  4 = None, Modified [place]/Independent    Turning over in bed (including adjusting bedclothes, sheets and blankets)?: 4  Sitting down on and standing up from a chair with arms (e.g., wheelchair, bedside commode, etc.): 3  Moving from lying on back to sitting on the side of the bed?: 4  Need to walk in hospital room?: 3  Climbing 3-5 steps with a railing?: 3    AM-PAC Raw Score CMS G-Code Modifier Level of Impairment Assistance   6 % Total / Unable   7 - 9 CM 80 - 100% Maximal Assist   10 - 14 CL 60 - 80% Moderate Assist   15 - 19 CK 40 - 60% Moderate Assist   20 - 22 CJ 20 - 40% Minimal Assist   23 CI 1-20% SBA / CGA   24 CH 0% Independent/ Mod I     Patient left up in reclined chair with all lines intact, call button in reach and nurse notified.    Assessment:  Sharona Acosta is a 81 y.o. female with a medical diagnosis of Bacterial pneumonia .    Rehab identified problem list/impairments: Rehab identified problem list/impairments: weakness, impaired cognition, decreased lower extremity function, decreased coordination, decreased safety awareness, gait instability    Rehab potential is fair+.    Activity tolerance: Fair+    Discharge recommendations:   Discharge Facility/Level Of Care Needs: (Pt will benefit from further skilled PT services. Pt will need 24 hour supervision.)     Barriers to discharge: Barriers to Discharge: Decreased caregiver support, Other (Comment) (decreased safety awareness    Equipment recommendations:   Equipment Needed After Discharge: bedside commode     GOALS:   Physical Therapy Goals        Problem: Physical Therapy Goal    Goal Priority Disciplines Outcome Goal Variances Interventions   Physical Therapy Goal     PT/OT, PT Ongoing (interventions implemented as appropriate)     Description:  Goals to be met by: 17     Patient will increase functional independence with mobility by performin. Supine to sit with  Supervision  2. Rolling to Left and Right with Supervision  3. Sit to stand transfer with Supervision using RW  4. Bed to chair transfer with Supervision using Rolling Walker  5. Gait  x 150 feet with Supervision using Rolling Walker   6. Lower extremity exercise program x 20 reps per handout, with supervision                PLAN:    Patient to be seen 5 x/week (Mon-Fri)  to address the above listed problems via gait training, therapeutic activities, therapeutic exercises  Plan of Care expires: 05/13/17  Plan of Care reviewed with: patient         Dahiana Reyes, PTA  05/11/2017

## 2017-05-11 NOTE — SUBJECTIVE & OBJECTIVE
Interval History: no acute events. Had solid BM today.    Review of Systems   Respiratory: Negative.    Cardiovascular: Negative.    Gastrointestinal: Negative for diarrhea (loose stools).     Objective:     Vital Signs (Most Recent):  Temp: 98.8 °F (37.1 °C) (05/11/17 0730)  Pulse: 72 (05/11/17 0730)  Resp: 17 (05/11/17 0730)  BP: 139/66 (05/11/17 0730)  SpO2: (!) 94 % (05/11/17 0846) Vital Signs (24h Range):  Temp:  [98.2 °F (36.8 °C)-98.8 °F (37.1 °C)] 98.8 °F (37.1 °C)  Pulse:  [70-83] 72  Resp:  [17-18] 17  SpO2:  [90 %-100 %] 94 %  BP: (130-152)/(60-73) 139/66     Weight: 59 kg (130 lb)  Body mass index is 21.63 kg/(m^2).    Intake/Output Summary (Last 24 hours) at 05/11/17 1111  Last data filed at 05/11/17 0529   Gross per 24 hour   Intake              480 ml   Output                0 ml   Net              480 ml      Physical Exam   Constitutional: She appears well-developed and well-nourished. No distress.   Pleasant   HENT:   Right Ear: External ear normal.   Left Ear: External ear normal.   Nose: Nose normal.   Mouth/Throat: Oropharynx is clear and moist.   Eyes: Conjunctivae and EOM are normal. Pupils are equal, round, and reactive to light. No scleral icterus.   Neck: Normal range of motion. Neck supple. No thyromegaly present.   Cardiovascular: Normal rate.  Exam reveals no friction rub.    Murmur heard.  Pulmonary/Chest: Effort normal and breath sounds normal. No respiratory distress. She has no wheezes. She has no rales.   Abdominal: Soft. Bowel sounds are normal. She exhibits no distension and no mass. There is no tenderness.   No hepatosplenomegaly   Musculoskeletal: Normal range of motion. She exhibits no edema or deformity.   Lymphadenopathy:     She has no cervical adenopathy.   Neurological: She is alert. No cranial nerve deficit.   Oriented to self and place. Follows simple commands and answers questions appropriately   Skin: Skin is warm and dry. No pallor.   Nursing note and vitals  reviewed.      Significant Labs: All pertinent labs within the past 24 hours have been reviewed.    Significant Imaging: I have reviewed all pertinent imaging results/findings within the past 24 hours.  I have reviewed and interpreted all pertinent imaging results/findings within the past 24 hours.

## 2017-05-11 NOTE — ASSESSMENT & PLAN NOTE
Diarrhea improved. Is loose but less frequent. No water or mucous present. IS CONSIDERED NO LONGER INFECTIOUS ONCE STOOL BECOMES SOLID. NO NEED FOR REPEAT STOOL STUDIES FOR CDIFF. Continue metronidazole PO. Currently on day # 9. Contact precautions,had solid BM today.

## 2017-05-11 NOTE — PROGRESS NOTES
[facility] Medicine  Progress Note    Patient Name: Sharona Acosta  MRN: [id number]  Patient Class: IP- Inpatient   Admission Date: 4/28/2017  Length of Stay: 13 days  Attending Physician: Nicole Rudd MD  Primary Care Provider: Vianey Lee MD        Subjective:     Principal Problem:Bacterial pneumonia    HPI:  Sharona Acosta is a 81 wo woman with alcohol abuse, arthritis, atherosclerosis of the aorta, h/o compression fracture of spine, h/o stroke (1997), hypothyroidism, HLD, essential HTN, osteopenia who presented with her daughter who reports the patient has been experiencing 3 days of progressive weakness and confusion.  She normally ambulates independently but she has not been doing so with her walker over the last 3 days. She had a fall and was taken to [facility] where she was treated for a urinary tract infection w/ IV antibiotics and released 3 days ago. No oral antibiotic continued as an outpatient.  Patient currently resides in an independent living facility and there is concern that she is unable to take care of herself at home at this time.  Report of subjective fever.  The history is otherwise limited due to the condition of the patient.     In the emergency department routine laboratory studies and chest x-ray were obtained.  There is evidence of ill-defined bilateral lower interstitial opacities concerning for healthcare associated pneumonia.  She also had hypokalemia.  Her mental status did not improve while in the emergency department.  She is also noted to have a slightlyelevated troponin level.     Hospital medicine has been asked to admit for further evaluation and treatment.         Hospital Course:  She was found to have HCAP likely bacterial with sepsis based on CXR, leukocytosis, Tmax 100.9, and tachycardia. Empiric abx with vanc and cefepime were started. Blood cultures were collected. She had quick symptomatic improvement. Abx were  deescalated to moxifloxacin on 4/30. This regimen completed while inpatient. Patient also with watery diarrhea confirmed to be CDiff. On metronidazole.   She had a slightly elevated troponin. Cardiology consulted. Echo showed LVEF 55%, +DD, mild-mod AS, and Possible 1.5x1.1 cm mass noted in RA/IVC. CHINTAN on 5/1 to further investigate showed no evidence of RA/IVC/SVC mass (noted on TTE, ?prominent Chiari network vs intraesophageal) and grade 2 atheroma disease of aorta.   PT/OT recommended home health PT/OT on discharge, however patient requires 24 hour assistance that is not provided at her independent living facility, an assisted living facility nor at home unless a sitter would become available which is an out of pocket service. Family had expressed inability to afford this. Seeking SNF vs NH ([facility]) as options.diarrhea resolved,has solid BM.      Interval History: no acute events. Had solid BM today.    Review of Systems   Respiratory: Negative.    Cardiovascular: Negative.    Gastrointestinal: Negative for diarrhea (loose stools).     Objective:     Vital Signs (Most Recent):  Temp: 98.8 °F (37.1 °C) (05/11/17 0730)  Pulse: 72 (05/11/17 0730)  Resp: 17 (05/11/17 0730)  BP: 139/66 (05/11/17 0730)  SpO2: (!) 94 % (05/11/17 0846) Vital Signs (24h Range):  Temp:  [98.2 °F (36.8 °C)-98.8 °F (37.1 °C)] 98.8 °F (37.1 °C)  Pulse:  [70-83] 72  Resp:  [17-18] 17  SpO2:  [90 %-100 %] 94 %  BP: (130-152)/(60-73) 139/66     Weight: 59 kg (130 lb)  Body mass index is 21.63 kg/(m^2).    Intake/Output Summary (Last 24 hours) at 05/11/17 1111  Last data filed at 05/11/17 0529   Gross per 24 hour   Intake              480 ml   Output                0 ml   Net              480 ml      Physical Exam   Constitutional: She appears well-developed and well-nourished. No distress.   Pleasant   HENT:   Right Ear: External ear normal.   Left Ear: External ear normal.   Nose: Nose normal.   Mouth/Throat: Oropharynx is clear and moist.    Eyes: Conjunctivae and EOM are normal. Pupils are equal, round, and reactive to light. No scleral icterus.   Neck: Normal range of motion. Neck supple. No thyromegaly present.   Cardiovascular: Normal rate.  Exam reveals no friction rub.    Murmur heard.  Pulmonary/Chest: Effort normal and breath sounds normal. No respiratory distress. She has no wheezes. She has no rales.   Abdominal: Soft. Bowel sounds are normal. She exhibits no distension and no mass. There is no tenderness.   No hepatosplenomegaly   Musculoskeletal: Normal range of motion. She exhibits no edema or deformity.   Lymphadenopathy:     She has no cervical adenopathy.   Neurological: She is alert. No cranial nerve deficit.   Oriented to self and place. Follows simple commands and answers questions appropriately   Skin: Skin is warm and dry. No pallor.   Nursing note and vitals reviewed.      Significant Labs: All pertinent labs within the past 24 hours have been reviewed.    Significant Imaging: I have reviewed all pertinent imaging results/findings within the past 24 hours.  I have reviewed and interpreted all pertinent imaging results/findings within the past 24 hours.    Assessment/Plan:      * Bacterial pneumonia  Chest x-ray notable for ill-defined bilateral lower lung interstitial opacities, concerning for pneumonia. Initially met sepsis criteria, now resolved.  D/C from [facility] recently. Chest x-ray indicates possible underlying history of pulmonary fibrosis. Changed moxi to PO. Abx treatment completed. Currently stable at room air    Hypertension, benign  Goal while inpatient is a systolic blood pressure less than 160mmHg. BP currently at goal. Continue verapamil    Hyperlipidemia  Statin      Hypothyroid  Continued current home regimen. TFT with low T3 but otherwise normal.    H/O: stroke  Stable. Cont ASA and statin.    Mild cognitive impairment  It is mild. Patient is alert and follows all commands. Able to carry out  conversation without issues.     Alcohol use  It does not appear to be an issue as she has not shown signs of withdrawal. Thiamine, folic acid and MVI    Elevated troponin  Likely cardiac strain associated with increased metabolic demand with acute infection and sepsis. Is asymptomatic. Echo with normal LV function and no wall motion abnormalities. Did show an intracardiac mass. Unknown cause or etiology. Currently not causing any symptoms. To be re-evaluated as outpatient. MPI as outpatinent. Appreciate cardiology recs.     Nonrheumatic aortic valve stenosis  Mild-mod    Abnormal echocardiogram findings without diagnosis  CHINTAN as above    Physical deconditioning  Patient at independent living facility prior to admission. It appears patient requires 24-hour assistance and skilled PT. No 24-hour assistance currently offered at either independent nor assisted living facility. No 24-hour assistance available at home. Consulted for SNF placement      Clostridium difficile infection  Diarrhea improved. Is loose but less frequent. No water or mucous present. IS CONSIDERED NO LONGER INFECTIOUS ONCE STOOL BECOMES SOLID. NO NEED FOR REPEAT STOOL STUDIES FOR CDIFF. Continue metronidazole PO. Currently on day # 9. Contact precautions,had solid BM today.      VTE Risk Mitigation         Ordered     enoxaparin injection 40 mg  Daily     Route:  Subcutaneous        05/02/17 1735     Medium Risk of VTE  Once      04/29/17 0141     Place EVERETTE hose  Until discontinued      04/29/17 0141     Place sequential compression device  Until discontinued      04/29/17 0141          Nicole Rudd MD  Department of Hospital Medicine   [facility]

## 2017-05-11 NOTE — PLAN OF CARE
Problem: Occupational Therapy Goal  Goal: Occupational Therapy Goal  Goals to be met by: 5.10.17     Patient will increase functional independence with ADLs by performing:    Feeding with Set-up Assistance. Gaol met 5/8/17  UE Dressing with Minimal Assistance. Goal met 5/10/17  Grooming while seated with Contact Guard Assistance. Goal met 5/8/17  Rolling to Right with Set-up Assistance. Goal met 5/10/17  Upper extremity exercise program x10 reps per handout, with assistance as needed. Goal Met 5/11/17   Outcome: Outcome(s) achieved Date Met:  05/11/17  Pt has achieved all established goals.Pt will need supervision and c/g at this time secondary too decreased safety awareness. No need for skilled  OT services at this time.

## 2017-05-11 NOTE — ASSESSMENT & PLAN NOTE
Chest x-ray notable for ill-defined bilateral lower lung interstitial opacities, concerning for pneumonia. Initially met sepsis criteria, now resolved.  D/C from [facility] recently. Chest x-ray indicates possible underlying history of pulmonary fibrosis. Changed moxi to PO. Abx treatment completed. Currently stable at room air

## 2017-05-11 NOTE — PT/OT/SLP PROGRESS
Occupational Therapy  Treatment    Sharona Acosta   MRN: [id number]   Admitting Diagnosis: Bacterial pneumonia    OT Date of Treatment: 17   OT Start Time: 1130  OT Stop Time: 1156  OT Total Time (min): 26 min    Billable Minutes:  Self Care/Home Management 26    General Precautions: Standard, fall  Orthopedic Precautions: N/A  Braces: N/A         Subjective:  Communicated with nurse Leeann prior to session.    Pain Ratin/10   Pain Rating Post-Intervention: 0/10    Objective:  Patient found with: telemetry, peripheral IV     Functional Mobility:  Bed Mobility:  Rolling/Turning to Left: Contact guard assistance  Rolling/Turning Right: Contact guard assistance  Scooting/Bridging: Stand by Assistance    Transfers:   Sit <> Stand Assistance: Contact Guard Assistance  Sit <> Stand Assistive Device: Rolling Walker  Bed <> Chair Technique: Stand Pivot  Bed <> Chair Assistive Device: Rolling Walker    Functional Ambulation: Pt able to ambulate with RW to sink with c/g    Activities of Daily Living:  Feeding Level of Assistance: Set-up Assistance  UE Dressing Level of Assistance: Contact guard  Grooming Position: Standing, Standing at sink (Pt able to tolerate standing for 7 minutes performing groioming at sink)  Grooming Level of Assistance: Contact guard assistance      Balance:   Static Sit: GOOD-: Takes MODERATE challenges from all directions but inconsistently  Dynamic Sit: GOOD-: Maintains balance through MODERATE excursions of active trunk movement,     Static Stand: FAIR+: Takes MINIMAL challenges from all directions  Dynamic stand: FAIR: Needs CONTACT GUARD during gait    Therapeutic Activities and Exercises:  3 sets of 20 with supervision    AM-PAC 6 CLICK ADL   How much help from another person does this patient currently need?   1 = Unable, Total/Dependent Assistance  2 = A lot, Maximum/Moderate Assistance  3 = A little, Minimum/Contact Guard/Supervision  4 = None, Modified  [place]/Independent    Putting on and taking off regular lower body clothing? : 3  Bathing (including washing, rinsing, drying)?: 2  Toileting, which includes using toilet, bedpan, or urinal? : 2  Putting on and taking off regular upper body clothing?: 3  Taking care of personal grooming such as brushing teeth?: 3  Eating meals?: 3  Total Score: 16     AM-PAC Raw Score CMS G-Code Modifier Level of Impairment Assistance   6 % Total / Unable   7 - 9 CM 80 - 100% Maximal Assist   10 - 14 CL 60 - 80% Moderate Assist   15 - 19 CK 40 - 60% Moderate Assist   20 - 22 CJ 20 - 40% Minimal Assist   23 CI 1-20% SBA / CGA   24 CH 0% Independent/ Mod I     Patient left up in chair with all lines intact, call button in reach and nurse notified PCT in room.    ASSESSMENT:  Sharona Acosta is a 81 y.o. female with a medical diagnosis of Bacterial pneumonia and presents with good mobility but will need c/g and supevision for all levels of self care and mobility.    Rehab identified problem list/impairments: Rehab identified problem list/impairments: impaired endurance, weakness, gait instability, impaired cognition, impaired functional mobilty, impaired self care skills, decreased safety awareness    Rehab potential is good.    Activity tolerance: Good    Discharge recommendations: Discharge Facility/Level Of Care Needs: home health OT (Pt continues to need supervision with all mobility)     Barriers to discharge: Barriers to Discharge: Decreased caregiver support    Equipment recommendations: bedside commode     GOALS:   Occupational Therapy Goals     Not on file      Multidisciplinary Problems (Resolved)        Problem: Occupational Therapy Goal    Goal Priority Disciplines Outcome Interventions   Occupational Therapy Goal   (Resolved)     OT, PT/OT Outcome(s) achieved    Description:  Goals to be met by: 5.10.17     Patient will increase functional independence with ADLs by performing:    Feeding with Set-up  Assistance. Gaol met 5/8/17  UE Dressing with Minimal Assistance. Goal met 5/10/17  Grooming while seated with Contact Guard Assistance. Goal met 5/8/17  Rolling to Right with Set-up Assistance. Goal met 5/10/17  Upper extremity exercise program x10 reps per handout, with assistance as needed. Goal Met 5/11/17                   Plan:  Patient to be seen 3 x/week to address the above listed problems via self-care/home management, therapeutic activities, therapeutic exercises  Plan of Care expires: 05/11/17  Plan of Care reviewed with: patient         Susan Garg OT  05/11/2017

## 2017-05-11 NOTE — PLAN OF CARE
Problem: Patient Care Overview  Goal: Plan of Care Review  Outcome: Ongoing (interventions implemented as appropriate)    05/11/17 1511   Coping/Psychosocial   Plan Of Care Reviewed With patient   Plan of care reviewed with patient.  She remains a high fall risk, and is encouraged to call for assistance.  Will continue to assist with turning and repositioning every 2 hours.

## 2017-05-12 ENCOUNTER — OUTPATIENT CASE MANAGEMENT (OUTPATIENT)
Dept: ADMINISTRATIVE | Facility: OTHER | Age: 82
End: 2017-05-12

## 2017-05-12 VITALS
SYSTOLIC BLOOD PRESSURE: 146 MMHG | WEIGHT: 130 LBS | TEMPERATURE: 98 F | OXYGEN SATURATION: 96 % | DIASTOLIC BLOOD PRESSURE: 68 MMHG | HEIGHT: 65 IN | BODY MASS INDEX: 21.66 KG/M2 | RESPIRATION RATE: 17 BRPM | HEART RATE: 78 BPM

## 2017-05-12 PROCEDURE — 25000003 PHARM REV CODE 250: Performed by: INTERNAL MEDICINE

## 2017-05-12 PROCEDURE — 25000003 PHARM REV CODE 250: Performed by: EMERGENCY MEDICINE

## 2017-05-12 PROCEDURE — 25000003 PHARM REV CODE 250: Performed by: HOSPITALIST

## 2017-05-12 PROCEDURE — 27000221 HC OXYGEN, UP TO 24 HOURS

## 2017-05-12 PROCEDURE — 63600175 PHARM REV CODE 636 W HCPCS: Performed by: INTERNAL MEDICINE

## 2017-05-12 PROCEDURE — 94760 N-INVAS EAR/PLS OXIMETRY 1: CPT

## 2017-05-12 RX ORDER — FOLIC ACID 1 MG/1
1 TABLET ORAL DAILY
Refills: 0
Start: 2017-05-12 | End: 2018-03-05 | Stop reason: SDUPTHER

## 2017-05-12 RX ORDER — LANOLIN ALCOHOL/MO/W.PET/CERES
400 CREAM (GRAM) TOPICAL DAILY
Refills: 0
Start: 2017-05-12 | End: 2018-03-05 | Stop reason: ALTCHOICE

## 2017-05-12 RX ADMIN — LEVOTHYROXINE SODIUM 75 MCG: 75 TABLET ORAL at 08:05

## 2017-05-12 RX ADMIN — MAGNESIUM OXIDE TAB 400 MG (241.3 MG ELEMENTAL MG) 400 MG: 400 (241.3 MG) TAB at 08:05

## 2017-05-12 RX ADMIN — METRONIDAZOLE 500 MG: 500 TABLET ORAL at 05:05

## 2017-05-12 RX ADMIN — THIAMINE HCL TAB 100 MG 100 MG: 100 TAB at 09:05

## 2017-05-12 RX ADMIN — ASPIRIN 81 MG CHEWABLE TABLET 81 MG: 81 TABLET CHEWABLE at 08:05

## 2017-05-12 RX ADMIN — VERAPAMIL HYDROCHLORIDE 180 MG: 180 TABLET, FILM COATED, EXTENDED RELEASE ORAL at 08:05

## 2017-05-12 RX ADMIN — THERA TABS 1 TABLET: TAB at 08:05

## 2017-05-12 RX ADMIN — METRONIDAZOLE 500 MG: 500 TABLET ORAL at 02:05

## 2017-05-12 RX ADMIN — FOLIC ACID 1 MG: 1 TABLET ORAL at 08:05

## 2017-05-12 RX ADMIN — MICONAZOLE NITRATE: 20 CREAM TOPICAL at 08:05

## 2017-05-12 RX ADMIN — ENOXAPARIN SODIUM 40 MG: 100 INJECTION SUBCUTANEOUS at 05:05

## 2017-05-12 NOTE — PROGRESS NOTES
[facility]  Adult Nutrition  Progress Note    SUMMARY     Recommendations    Recommendation/Intervention: 1) Encourage po intake 2) Appetite stimulant 3) Continue oral supplement 4) Monitor po intake  Goals: 1) Patient to consume >=50% of meals 2) Patient to utilize oral supplements  Nutrition Goal Status: new  Communication of RD Recs: reviewed with RN    Continuum of Care Plan    D/C Planning: Patient will consume adequate intake for optimal nutrition with oral supplements as needed.     Reason for Assessment    Reason for Assessment: RD follow-up  Diagnosis:  (sepsis, elevated troponin, HTN, ETOH abuse, A-Fib)    General Information Comments: + poor appetite. Consuming ~50% of supplements. Tolerating diet well. No nausea, vomiting, abdominal pain.     Nutrition Prescription Ordered    Current Diet Order: Regular  Oral Nutrition Supplement: Boost Breeze TID     Evaluation of Received Nutrients/Fluid Intake    Energy Calories Required: not meeting needs  Protein Required: not meeting needs  Fluid Required: other (see comments) (Net I/O +240)     Tolerance: tolerating     Nutrition Risk Screen     Nutrition Risk Screen: no indicators present    Nutrition/Diet History    Patient Reported Diet/Restrictions/Preferences: general  Typical Food/Fluid Intake: Fruit.   Food Preferences: No cultural, Shinto or ethnic food preferences.    Labs/Tests/Procedures/Meds       Pertinent Labs Reviewed: reviewed  Pertinent Medications Reviewed: reviewed  Pertinent Medications Comments: MVI, Thiamin, Folic Acid, statin    Physical Findings    Oral/Mouth Cavity: WDL  Skin: non-healing wound(s)    Anthropometrics    Height (inches): 65 in  Weight Method: Stated  Weight (kg): 59 kg  Ideal Body Weight (IBW), Female: 125 lb  % Ideal Body Weight, Female (lb): 104.06 lb  BMI (kg/m2): 21.65  BMI Grade: 18.5-24.9 - normal    Estimated/Assessed Needs    Weight Used For Calorie Calculations: 59 kg (130 lb 1.1 oz)   Height  (cm): 165.1 cm  Energy Need Method: Kcal/kg (1475 - 1770)  RMR (Kingfisher-St. Jeor Equation): 1071.6  Weight Used For Protein Calculations: 59 kg (130 lb 1.1 oz)  Protein Requirements: 53 - 70.8   Fluid Need Method: RDA Method, other (see comments) (or per MD)        Nutrition Diagnosis  Nutrition Problem: Inadequate energy intake  Etiology: Poor oral intake  Signs/Symptoms: Patient consuming ~25% of meals  Status: continues      Monitor and Evaluation    Food and Nutrient Intake: energy intake, food and beverage intake  Food and Nutrient Adminstration: diet order, other (specify) (supplement order)  Anthropometric Measurements: weight, weight change, body mass index  Biochemical Data, Medical Tests and Procedures: electrolyte and renal panel, gastrointestinal profile, lipid profile  Nutrition-Focused Physical Findings: overall appearance, skin  % Intake of Estimated Energy Needs: 25 - 50 %  % Meal Intake: 25%    Nutrition Risk    Level of Risk: other (see comments) (f/u 2x weekly)    Nutrition Follow-Up    RD Follow-up?: Yes

## 2017-05-12 NOTE — ASSESSMENT & PLAN NOTE
Chest x-ray notable for ill-defined bilateral lower lung interstitial opacities, concerning for pneumonia. Initially met sepsis criteria, now resolved.  D/C from [facility] recently. Chest x-ray indicates possible underlying history of pulmonary fibrosis. Changed moxi to PO. Abx treatment completed. Currently stable at room air

## 2017-05-12 NOTE — DISCHARGE SUMMARY
[facility] Hospital Medicine  Discharge Summary      Patient Name: Sharona Acosta  MRN: [id number]  Admission Date: 4/28/2017  Hospital Length of Stay: 14 days  Discharge Date and Time:  05/12/2017 6:15 AM  Attending Physician: Nicole Rudd MD   Discharging Provider: Nicole Rudd MD  Primary Care Provider: Vianey Lee MD      HPI:   Sharona Acosta is a 81 wo woman with alcohol abuse, arthritis, atherosclerosis of the aorta, h/o compression fracture of spine, h/o stroke (1997), hypothyroidism, HLD, essential HTN, osteopenia who presented with her daughter who reports the patient has been experiencing 3 days of progressive weakness and confusion.  She normally ambulates independently but she has not been doing so with her walker over the last 3 days. She had a fall and was taken to [facility] where she was treated for a urinary tract infection w/ IV antibiotics and released 3 days ago. No oral antibiotic continued as an outpatient.  Patient currently resides in an independent living facility and there is concern that she is unable to take care of herself at home at this time.  Report of subjective fever.  The history is otherwise limited due to the condition of the patient.     In the emergency department routine laboratory studies and chest x-ray were obtained.  There is evidence of ill-defined bilateral lower interstitial opacities concerning for healthcare associated pneumonia.  She also had hypokalemia.  Her mental status did not improve while in the emergency department.  She is also noted to have a slightlyelevated troponin level.     Hospital medicine has been asked to admit for further evaluation and treatment.         Procedure(s) (LRB):  TRANSESOPHAGEAL ECHOCARDIOGRAM (CHINTAN) (N/A)      Indwelling Lines/Drains at time of discharge:   Lines/Drains/Airways     Pressure Ulcer                 Pressure Ulcer 06/03/16 0739 Right ear, right Stage  days          Pressure Ulcer 06/03/16 0741 Right heel suspected deep tissue injury 342 days              Hospital Course:   She was found to have HCAP likely bacterial with sepsis based on CXR, leukocytosis, Tmax 100.9, and tachycardia. Empiric abx with vanc and cefepime were started. Blood cultures were collected. She had quick symptomatic improvement. Abx were deescalated to moxifloxacin on 4/30. This regimen completed while inpatient. Patient also with watery diarrhea confirmed to be C-Diff. Has been started on  Metronidazole and patient finished the course of treatment,diarrhea resolved and patient had firm stool.  She had a slightly elevated troponin. Cardiology consulted. Echo showed LVEF 55%, +DD, mild-mod AS, and Possible 1.5x1.1 cm mass noted in RA/IVC. CHINTAN on 5/1 to further investigate showed no evidence of RA/IVC/SVC mass (noted on TTE, ?prominent Chiari network vs intraesophageal) and grade 2 atheroma disease of aorta.   PT/OT recommended home health PT/OT on discharge, however patient requires 24 hour assistance that is not provided at her independent living facility, an assisted living facility nor at home unless a sitter would become available which is an out of pocket service. Family had expressed inability to afford this. Seeking SNF vs NH (residential) as options.social service was consulted and NH placement has been arranged,patient has been discharged to NH.       Consults:   Consults         Status Ordering Provider     Inpatient consult to Cardiology  Once     Provider:  Robert Ramso MD    Completed LULU ANDUJAR     Inpatient consult to SNF  Once     Provider:  (Not yet assigned)    Completed CRISPIN RIZO          Significant Diagnostic Studies: Labs:   BMP:   Recent Labs  Lab 05/11/17  0428   GLU 91      K 3.6      CO2 26   BUN 4*   CREATININE 0.7   CALCIUM 8.6*   MG 1.4*    and CBC No results for input(s): WBC, HGB, HCT, PLT in the last 48 hours.  Microbiology:   Blood Culture    Lab Results   Component Value Date    LABBLOO No growth after 5 days. 04/28/2017    and Sputum Culture No results found for: GSRESP, RESPIRATORYC  Radiology: X-Ray: CXR: X-Ray Chest 1 View (CXR): No results found for this visit on 04/28/17.  Cardiac Graphics: Echocardiogram:   2D echo with color flow doppler:   Results for orders placed or performed during the hospital encounter of 04/28/17   2D echo with color flow doppler   Result Value Ref Range    EF 55 55 - 65    Mitral Valve Regurgitation TRIVIAL     Diastolic Dysfunction Yes (A)     Aortic Valve Stenosis MILD (A)     Est. PA Systolic Pressure 49.99 (A)     Pericardial Effusion NONE     Mitral Valve Mobility NORMAL     Tricuspid Valve Regurgitation TRIVIAL        Pending Diagnostic Studies:     None        Final Active Diagnoses:    Diagnosis Date Noted POA    PRINCIPAL PROBLEM:  Bacterial pneumonia [J15.9] 04/29/2017 Yes    Physical deconditioning [R53.81] 05/02/2017 Yes    Clostridium difficile infection [B96.89] 05/02/2017 Yes    Nonrheumatic aortic valve stenosis [I35.0] 05/01/2017 Yes    Abnormal echocardiogram findings without diagnosis [R93.1] 05/01/2017 Yes    Elevated troponin [R74.8] 04/29/2017 Yes    Alcohol use [Z78.9] 06/02/2016 Yes     Chronic    Mild cognitive impairment [G31.84] 02/01/2016 Yes    H/O: stroke [Z86.73]  Not Applicable    Hypertension, benign [I10] 11/28/2012 Yes    Hyperlipidemia [E78.5] 11/28/2012 Yes    Hypothyroid [E03.9] 11/28/2012 Yes      Problems Resolved During this Admission:    Diagnosis Date Noted Date Resolved POA    Hypokalemia [E87.6] 04/29/2017 05/04/2017 Yes    Neutrophilic leukocytosis [D72.9] 04/29/2017 05/04/2017 Yes    HCAP (healthcare-associated pneumonia) [J18.9] 04/29/2017 05/06/2017 Yes    Encephalopathy, metabolic [G93.41] 04/29/2017 04/30/2017 Yes      * Bacterial pneumonia  Chest x-ray notable for ill-defined bilateral lower lung interstitial opacities, concerning for pneumonia.  Initially met sepsis criteria, now resolved.  D/C from [facility] recently. Chest x-ray indicates possible underlying history of pulmonary fibrosis. Changed moxi to PO. Abx treatment completed. Currently stable at room air    Hypertension, benign  Goal while inpatient is a systolic blood pressure less than 160mmHg. BP currently at goal. Continue verapamil    Hyperlipidemia  Statin      Hypothyroid  Continued current home regimen. TFT with low T3 but otherwise normal.    H/O: stroke  Stable. Cont ASA and statin.    Mild cognitive impairment  It is mild. Patient is alert and follows all commands. Able to carry out conversation without issues.     Alcohol use  It does not appear to be an issue as she has not shown signs of withdrawal. Thiamine, folic acid and MVI    Elevated troponin  Likely cardiac strain associated with increased metabolic demand with acute infection and sepsis. Is asymptomatic. Echo with normal LV function and no wall motion abnormalities. Did show an intracardiac mass. Unknown cause or etiology. Currently not causing any symptoms. To be re-evaluated as outpatient. MPI as outpatinent. Appreciate cardiology recs.     Nonrheumatic aortic valve stenosis  Mild-mod    Abnormal echocardiogram findings without diagnosis  CHINTAN as above    Physical deconditioning  Patient at independent living facility prior to admission. It appears patient requires 24-hour assistance and skilled PT. No 24-hour assistance currently offered at either independent nor assisted living facility. No 24-hour assistance available at home.has been placed in NH.      Clostridium difficile infection  Diarrhea improved. Is loose but less frequent. No water or mucous present. IS CONSIDERED NO LONGER INFECTIOUS ONCE STOOL BECOMES SOLID. NO NEED FOR REPEAT STOOL STUDIES FOR CDIFF. Continue metronidazole PO. Currently on day # 10. Contact precautions,had solid BM ,finished the course of treatment.        Discharged Condition:  stable    Disposition: Nursing home.  Follow Up:  Follow-up Information     Follow up with Vianey Lee MD In 2 weeks.    Specialties:  Internal Medicine, Pediatrics    Contact information:    [place] [id number] [phone]          Patient Instructions:     Ambulatory referral to Outpatient Case Management   Referral Priority: Routine Referral Type: Consultation   Referral Reason: Specialty Services Required    Number of Visits Requested: 1      Diet general     Activity as tolerated       Medications:  Reconciled Home Medications:   Current Discharge Medication List      START taking these medications    Details   folic acid (FOLVITE) 1 MG tablet Take 1 tablet (1 mg total) by mouth once daily.  Refills: 0      magnesium oxide (MAG-OX) 400 mg tablet Take 1 tablet (400 mg total) by mouth once daily.  Refills: 0      multivitamin (THERAGRAN) tablet Take 1 tablet by mouth once daily.         CONTINUE these medications which have NOT CHANGED    Details   alendronate (FOSAMAX) 70 MG tablet TAKE 1 TABLET BY MOUTH EVERY 7 DAYS  Qty: 12 tablet, Refills: 0    Associated Diagnoses: Osteopenia      levothyroxine (SYNTHROID) 75 MCG tablet TAKE 1 TABLET BY MOUTH EVERY DAY  Qty: 90 tablet, Refills: 0      simvastatin (ZOCOR) 40 MG tablet TAKE 1 TABLET BY MOUTH EVERY EVENING  Qty: 90 tablet, Refills: 0    Associated Diagnoses: Hyperlipidemia      thiamine (VITAMIN B-1) 100 MG tablet Take 100 mg by mouth once daily.      verapamil (VERELAN) 240 MG C24P TAKE 1 CAPSULE BY MOUTH EVERY DAY  Qty: 90 capsule, Refills: 0    Associated Diagnoses: Hypertension, benign      acetaminophen (TYLENOL) 325 MG tablet Take 2 tablets (650 mg total) by mouth every 8 (eight) hours as needed (Mild pain or T>100.4).  Refills: 0      aspirin 81 MG Chew Take 81 mg by mouth once daily.        polyethylene glycol (GLYCOLAX) 17 gram PwPk Take 17 g by mouth once daily.  Refills: 0         STOP taking these medications       diazepam  (VALIUM) 2 MG tablet Comments:   Reason for Stopping:         hydrocodone-acetaminophen 5-325mg (NORCO) 5-325 mg per tablet Comments:   Reason for Stopping:         naproxen sodium (ANAPROX) 220 MG tablet Comments:   Reason for Stopping:             Time spent on the discharge of patient: 30  minutes    Nicole Rudd MD  Department of Hospital Medicine  [facility]

## 2017-05-12 NOTE — PLAN OF CARE
Problem: Patient Care Overview  Goal: Plan of Care Review  Outcome: Ongoing (interventions implemented as appropriate)  Patient remains at risk for skin impairment, due to incontinence and limited mobility.  She is also a fall risk, but she calls for assistance.  Will continue current approaches

## 2017-05-12 NOTE — PLAN OF CARE
[facility]     Department of Hospital Medicine     [address] [place] [address]     [phone] after hours  [phone] fax       NURSING HOME ORDERS    05/12/2017    Admit to Nursing Home:  Regular Bed                                 Diagnoses:  Active Hospital Problems    Diagnosis  POA    *Bacterial pneumonia [J15.9]  Yes    Physical deconditioning [R53.81]  Yes    Clostridium difficile infection [B96.89]  Yes    Nonrheumatic aortic valve stenosis [I35.0]  Yes    Abnormal echocardiogram findings without diagnosis [R93.1]  Yes    Elevated troponin [R74.8]  Yes    Alcohol use [Z78.9]  Yes     Chronic    Mild cognitive impairment [G31.84]  Yes     - 2/1/2016 MMSE = 21      H/O: stroke [Z86.73]  Not Applicable     h/o stroke more than 15 yrs ago with no residual      Hypertension, benign [I10]  Yes    Hyperlipidemia [E78.5]  Yes    Hypothyroid [E03.9]  Yes      Resolved Hospital Problems    Diagnosis Date Resolved POA    Hypokalemia [E87.6] 05/04/2017 Yes    Neutrophilic leukocytosis [D72.9] 05/04/2017 Yes    HCAP (healthcare-associated pneumonia) [J18.9] 05/06/2017 Yes     Chest x-ray notable for ill-defined bilateral lower lung interstitial opacities, concerning for pneumonia.  D/C from [facility] 3 days ago.      Encephalopathy, metabolic [G93.41] 04/30/2017 Yes       Patient is homebound due to:  Bacterial pneumonia    Allergies:  Review of patient's allergies indicates:   Allergen Reactions    Lipitor [atorvastatin] Nausea Only       Vitals:      Every shift (Skilled Nursing patients)    Diet: cardiac diet     Acitivities:     - Up in a chair each morning as tolerated   - Ambulate with assistance to bathroom     - May ambulate independently   - May use walker, cane, or self-propelled wheelchair   - Weight bearing:as tolerated.    LABS:  Per facility protocol    Nursing Precautions:    - Aspiration precautions:                         -  Upright 90 degrees befor during and after meals             -  Suction at bedside          - Fall precautions per nursing home protocol     - Decubitus precautions:        -  for positioning   - Pressure reducing foam mattress   - Turn patient every two hours. Use wedge pillows to anchor patient        MISCELLANEOUS CARE:                 Routine Skin for Bedridden Patients:  Apply moisture barrier cream to all    skin folds and wet areas in perineal area daily and after baths and                           all bowel movements.                   Medications: Discontinue all previous medication orders, if any. See new list below.     Sharona Acosta   [place] Medication Instructions PIEDAD:[id number]    Printed on:05/12/17 0616   Medication Information                      acetaminophen (TYLENOL) 325 MG tablet  Take 2 tablets (650 mg total) by mouth every 8 (eight) hours as needed (Mild pain or T>100.4).             alendronate (FOSAMAX) 70 MG tablet  TAKE 1 TABLET BY MOUTH EVERY 7 DAYS,on mondays             aspirin 81 MG Chew  Take 81 mg by mouth once daily.               folic acid (FOLVITE) 1 MG tablet  Take 1 tablet (1 mg total) by mouth once daily.             levothyroxine (SYNTHROID) 75 MCG tablet  TAKE 1 TABLET BY MOUTH EVERY DAY             magnesium oxide (MAG-OX) 400 mg tablet  Take 1 tablet (400 mg total) by mouth once daily.             multivitamin (THERAGRAN) tablet  Take 1 tablet by mouth once daily.             polyethylene glycol (GLYCOLAX) 17 gram PwPk  Take 17 g by mouth once daily.             simvastatin (ZOCOR) 40 MG tablet  TAKE 1 TABLET BY MOUTH EVERY EVENING             thiamine (VITAMIN B-1) 100 MG tablet  Take 100 mg by mouth once daily.             verapamil (VERELAN) 240 MG C24P  TAKE 1 CAPSULE BY MOUTH EVERY DAY                       _________________________________  Nicole Rudd MD  05/12/2017

## 2017-05-12 NOTE — ASSESSMENT & PLAN NOTE
Diarrhea improved. Is loose but less frequent. No water or mucous present. IS CONSIDERED NO LONGER INFECTIOUS ONCE STOOL BECOMES SOLID. NO NEED FOR REPEAT STOOL STUDIES FOR CDIFF. Continue metronidazole PO. Currently on day # 10. Contact precautions,had solid BM ,finished the course of treatment.

## 2017-05-12 NOTE — PLAN OF CARE
Problem: Patient Care Overview  Goal: Plan of Care Review  Outcome: Ongoing (interventions implemented as appropriate)    05/12/17 5139   Coping/Psychosocial   Plan Of Care Reviewed With patient   A+Ox4. Pt free from falls and injury. Turned pt q2h. Meds given. Reddened wound in perineum area clean and dry. Tele box number [id number]. No acute distress noted. Will continue to monitor.

## 2017-05-12 NOTE — NURSING
SPD states that it will be another hour before  patient. [facility] notified of delay.

## 2017-05-12 NOTE — PLAN OF CARE
05/11/17 1354   Medicare Message   Important Message from Medicare regarding Discharge Appeal Rights Explained to patient/caregiver;Signed/date by patient/caregiver;Given to patient/caregiver   Date IMM was signed 05/11/17   Time IMM was signed [id number]

## 2017-05-12 NOTE — PT/OT/SLP PROGRESS
"Physical Therapy      Sharona Acosta  MRN: [id number]    Patient not seen today secondary to Patient unwilling to participate, Other (Comment). Pt stated " I am going to go somewhere today, I am tired of this place ".  Will follow-up as able .    Dahiana Reyes PTA    "

## 2017-05-12 NOTE — ASSESSMENT & PLAN NOTE
Patient at independent living facility prior to admission. It appears patient requires 24-hour assistance and skilled PT. No 24-hour assistance currently offered at either independent nor assisted living facility. No 24-hour assistance available at home.has been placed in NH.

## 2017-05-12 NOTE — PROGRESS NOTES
RNCM will not follow this pt as pt is in the hospital and transferring to a post acute setting. Will re-refer if pt returns to independent living facility, if appropriate.

## 2017-05-12 NOTE — PLAN OF CARE
[facility]     Department of Hospital Medicine     [address] [place] [address]     [phone] after hours  [phone] fax       NURSING HOME ORDERS    05/12/2017    Admit to Nursing Home:  Skilled bed                                 Diagnoses:  Active Hospital Problems    Diagnosis  POA    *Bacterial pneumonia [J15.9]  Yes    Physical deconditioning [R53.81]  Yes    Clostridium difficile infection [B96.89]  Yes    Nonrheumatic aortic valve stenosis [I35.0]  Yes    Abnormal echocardiogram findings without diagnosis [R93.1]  Yes    Elevated troponin [R74.8]  Yes    Alcohol use [Z78.9]  Yes     Chronic    Mild cognitive impairment [G31.84]  Yes     - 2/1/2016 MMSE = 21      H/O: stroke [Z86.73]  Not Applicable     h/o stroke more than 15 yrs ago with no residual      Hypertension, benign [I10]  Yes    Hyperlipidemia [E78.5]  Yes    Hypothyroid [E03.9]  Yes      Resolved Hospital Problems    Diagnosis Date Resolved POA    Hypokalemia [E87.6] 05/04/2017 Yes    Neutrophilic leukocytosis [D72.9] 05/04/2017 Yes    HCAP (healthcare-associated pneumonia) [J18.9] 05/06/2017 Yes     Chest x-ray notable for ill-defined bilateral lower lung interstitial opacities, concerning for pneumonia.  D/C from [facility] 3 days ago.      Encephalopathy, metabolic [G93.41] 04/30/2017 Yes       Patient is homebound due to:  Bacterial pneumonia    Allergies:  Review of patient's allergies indicates:   Allergen Reactions    Lipitor [atorvastatin] Nausea Only       Vitals:      Every shift (Skilled Nursing patients)    Diet: cardiac diet     Acitivities:     - Up in a chair each morning as tolerated   - Ambulate with assistance to bathroom     - May ambulate independently   - May use walker, cane, or self-propelled wheelchair   - Weight bearing:as tolerated.    LABS:  Per facility protocol    Nursing Precautions:    - Aspiration precautions:                         -  Upright 90 degrees befor during and after meals             -  Suction at bedside          - Fall precautions per nursing home protocol     - Decubitus precautions:        -  for positioning   - Pressure reducing foam mattress   - Turn patient every two hours. Use wedge pillows to anchor patient        MISCELLANEOUS CARE:                 Routine Skin for Bedridden Patients:  Apply moisture barrier cream to all    skin folds and wet areas in perineal area daily and after baths and                           all bowel movements.                   Medications: Discontinue all previous medication orders, if any. See new list below.     Sharona Acosta   [place] Medication Instructions PIEDAD:[id number]    Printed on:05/12/17 0616   Medication Information                      acetaminophen (TYLENOL) 325 MG tablet  Take 2 tablets (650 mg total) by mouth every 8 (eight) hours as needed (Mild pain or T>100.4).             alendronate (FOSAMAX) 70 MG tablet  TAKE 1 TABLET BY MOUTH EVERY 7 DAYS,on mondays             aspirin 81 MG Chew  Take 81 mg by mouth once daily.               folic acid (FOLVITE) 1 MG tablet  Take 1 tablet (1 mg total) by mouth once daily.             levothyroxine (SYNTHROID) 75 MCG tablet  TAKE 1 TABLET BY MOUTH EVERY DAY             magnesium oxide (MAG-OX) 400 mg tablet  Take 1 tablet (400 mg total) by mouth once daily.             multivitamin (THERAGRAN) tablet  Take 1 tablet by mouth once daily.             polyethylene glycol (GLYCOLAX) 17 gram PwPk  Take 17 g by mouth once daily.             simvastatin (ZOCOR) 40 MG tablet  TAKE 1 TABLET BY MOUTH EVERY EVENING             thiamine (VITAMIN B-1) 100 MG tablet  Take 100 mg by mouth once daily.             verapamil (VERELAN) 240 MG C24P  TAKE 1 CAPSULE BY MOUTH EVERY DAY                       _________________________________  Nicole Rudd MD  05/12/2017

## 2017-05-12 NOTE — PLAN OF CARE
Problem: Patient Care Overview  Goal: Plan of Care Review  05/12/2017     Recommendations     Recommendation/Intervention: 1) Encourage po intake 2) Appetite stimulant 3) Continue oral supplement 4) Monitor po intake  Goals: 1) Patient to consume >=50% of meals 2) Patient to utilize oral supplements  Nutrition Goal Status: new  Communication of RD Recs: reviewed with DELFINO Gee, MPH, RD, LDN

## 2017-05-12 NOTE — PLAN OF CARE
05/12/17 1107   Final Note   Assessment Type Final Discharge Note   Discharge Disposition SNF   Discharge planning education complete? (Information provided to daughter Nona. )   What phone number can be called within the next 1-3 days to see how you are doing after discharge? [phone]   Offered [facility] -- Bedside Delivery? n/a   Discharge/Hospital Encounter Summary to ([facility]) PCP n/a   Referral to Outpatient Case Management complete? n/a   Referral to / orders for Home Health Complete? n/a   30 day supply of medicines given at discharge, if documented non-compliance / non-adherence? n/a   Did you assess the readiness or willingness of the family or caregiver to support self management of care? Yes     9:38AM- JOSE ENRIQUE contacted Debo with [facility] to determine if she has received an approval or denial from [facility]. Per Debo, she will not know anything until 10:30AM when Maria T (finial ) arrives for work.     11:00AM- Debo still does not have an answer. JOSE ENRIQUE informed Debo QUISPE will send pt discharge information for review in case the pt get approved. JOSE ENRIQUE faxed face sheet, chest-xray, PPD, 142, PASRR, nursing home orders, and discharge summary via [facility] to [facility]. JOSE ENRIQUE awaiting to determine if services will be provided.      2:00PM- Debo with [facility] contacted JOSE ENRIQUE and informed pt has been accepted. JOSE ENRIQUE awaiting call report information.     4:15PM- Debo contacted JOSE ENRIQUE with call report information. According to Debo pt will be going to room 4 and the nurse can call report to [phone]. JOSE ENRIQUE contacted [facility] @ [phone] to arrange transportation. JOSE ENRIQUE spoke with Lisandro and pr Lisandro transport will be here within an hour and a half or two hours.  JOSE ENRIQUE provided pt nurse Leeann with pt travel packet and call report information. JOSE ENRIQUE informed nurse of transport time.

## 2017-05-13 NOTE — PT/OT/SLP DISCHARGE
Physical Therapy Discharge Summary    Sharona Acosta  MRN: [id number]   Bacterial pneumonia   Patient Discharged from acute Physical Therapy on 17.  Please refer to prior PT noted date on 17 for functional status.     Assessment:   Goals partially met. Patient appropriate for care in another setting.  GOALS:   Physical Therapy Goals        Problem: Physical Therapy Goal    Goal Priority Disciplines Outcome Goal Variances Interventions   Physical Therapy Goal     PT/OT, PT Unable to achieve outcome(s) by discharge     Description:  Goals to be met by: 17     Patient will increase functional independence with mobility by performin. Supine to sit with Supervision  2. Rolling to Left and Right with Supervision  3. Sit to stand transfer with Supervision using RW  4. Bed to chair transfer with Supervision using Rolling Walker  5. Gait  x 150 feet with Supervision using Rolling Walker   6. Lower extremity exercise program x 20 reps per handout, with supervision              Reasons for Discontinuation of Therapy Services  Transfer to alternate level of care.      Plan:  Patient Discharged to: Skilled Nursing Facility.

## 2017-05-16 NOTE — TELEPHONE ENCOUNTER
Spoke w/patient's Daughter, informed of recommendations. States patient has appt schedule Friday, will discuss at OV.

## 2017-05-17 ENCOUNTER — OUTPATIENT CASE MANAGEMENT (OUTPATIENT)
Dept: ADMINISTRATIVE | Facility: OTHER | Age: 82
End: 2017-05-17

## 2017-05-17 NOTE — PROGRESS NOTES
Please note this patient was mailed a Patient Satisfaction Discharge Survey on 5-17-17        Thank you,      Maite Art, SSC

## 2017-05-18 ENCOUNTER — PATIENT OUTREACH (OUTPATIENT)
Dept: ADMINISTRATIVE | Facility: CLINIC | Age: 82
End: 2017-05-18
Payer: MEDICARE

## 2017-06-05 ENCOUNTER — PATIENT OUTREACH (OUTPATIENT)
Dept: ADMINISTRATIVE | Facility: CLINIC | Age: 82
End: 2017-06-05
Payer: MEDICARE

## 2017-06-05 DIAGNOSIS — R19.7 DIARRHEA, UNSPECIFIED TYPE: Primary | ICD-10-CM

## 2017-06-05 NOTE — PATIENT INSTRUCTIONS
What is Pneumonia?  Pneumonia is a serious lung infection. Many cases of pneumonia are caused by bacteria or viruses. Other less common causes include:  · Fungi  · Chemicals  · Gases    You may also get pneumonia after another illness, such as a cold, flu, or bronchitis. Those most at risk include the elderly and people with chronic health problems.  Healthy Lungs  · Air travels in and out of the lungs through tubes called airways.  · The tubes branch into smaller passages called bronchioles. These end in balloonlike sacs called alveoli.  · Blood vessels surrounding the alveoli take oxygen into the bloodstream. At the same time, the alveoli remove carbon dioxide (a waste gas) from the blood. The carbon dioxide is then exhaled.  When You Have Pneumonia  ·   Pneumonia causes the bronchioles and the alveoli to fill with excess mucus and become inflamed.  · Your bodys response may be to cough. This can help clear out the fluid.  · The fluid (or mucus) you cough up may appear green or dark yellow.  · The excess mucus may make you feel short of breath.  · The inflammation and infection may give you a fever.  What are the Symptoms?  Symptoms of pneumonia can come without warning. At first, you may think you have a cold or flu. But symptoms may get worse quickly, turning into pneumonia. Symyptoms can be different for bacterial and viral pneumonia. Common symptoms may include the following:  · Severe cough with green or yellow mucus that doesn't improve or gets worse  · Fever and chills  · Nausea, vomiting or diarrhea  · Shortness of breath with normal daily activities  · Increased heart rate  · Chest pain or discomfort when breathing in or coughing  · Headache  · Excessive sweating and clammy skin  Date Last Reviewed: 8/4/2014  © 4190-8033 [employer]. [address] [place] [address], [place] [address]. All rights reserved. This information is not intended as a substitute for professional medical care. Always follow  your healthcare professional's instructions.

## 2017-06-09 ENCOUNTER — OFFICE VISIT (OUTPATIENT)
Dept: FAMILY MEDICINE | Facility: CLINIC | Age: 82
End: 2017-06-09
Payer: MEDICARE

## 2017-06-09 ENCOUNTER — LAB VISIT (OUTPATIENT)
Dept: LAB | Facility: HOSPITAL | Age: 82
End: 2017-06-09
Attending: INTERNAL MEDICINE
Payer: MEDICARE

## 2017-06-09 VITALS
SYSTOLIC BLOOD PRESSURE: 102 MMHG | BODY MASS INDEX: 20.18 KG/M2 | OXYGEN SATURATION: 96 % | RESPIRATION RATE: 16 BRPM | WEIGHT: 109.69 LBS | HEART RATE: 84 BPM | TEMPERATURE: 98 F | HEIGHT: 62 IN | DIASTOLIC BLOOD PRESSURE: 56 MMHG

## 2017-06-09 DIAGNOSIS — Z09 HOSPITAL DISCHARGE FOLLOW-UP: Primary | ICD-10-CM

## 2017-06-09 DIAGNOSIS — R53.81 PHYSICAL DECONDITIONING: ICD-10-CM

## 2017-06-09 DIAGNOSIS — E55.9 VITAMIN D DEFICIENCY: ICD-10-CM

## 2017-06-09 DIAGNOSIS — Z86.73 H/O: STROKE: ICD-10-CM

## 2017-06-09 DIAGNOSIS — Z87.81 HISTORY OF VERTEBRAL FRACTURE: ICD-10-CM

## 2017-06-09 DIAGNOSIS — R19.7 DIARRHEA, UNSPECIFIED TYPE: ICD-10-CM

## 2017-06-09 DIAGNOSIS — I70.0 ATHEROSCLEROSIS OF AORTA: ICD-10-CM

## 2017-06-09 DIAGNOSIS — E78.5 HYPERLIPIDEMIA, UNSPECIFIED HYPERLIPIDEMIA TYPE: ICD-10-CM

## 2017-06-09 DIAGNOSIS — I10 HYPERTENSION, BENIGN: ICD-10-CM

## 2017-06-09 DIAGNOSIS — E03.9 ACQUIRED HYPOTHYROIDISM: ICD-10-CM

## 2017-06-09 LAB — WBC #/AREA STL HPF: NORMAL /[HPF]

## 2017-06-09 PROCEDURE — 89055 LEUKOCYTE ASSESSMENT FECAL: CPT

## 2017-06-09 PROCEDURE — 1126F AMNT PAIN NOTED NONE PRSNT: CPT | Mod: S$GLB,,, | Performed by: NURSE PRACTITIONER

## 2017-06-09 PROCEDURE — 99999 PR PBB SHADOW E&M-EST. PATIENT-LVL IV: CPT | Mod: PBBFAC,,, | Performed by: NURSE PRACTITIONER

## 2017-06-09 PROCEDURE — 99499 UNLISTED E&M SERVICE: CPT | Mod: S$GLB,,, | Performed by: NURSE PRACTITIONER

## 2017-06-09 PROCEDURE — 99214 OFFICE O/P EST MOD 30 MIN: CPT | Mod: S$GLB,,, | Performed by: NURSE PRACTITIONER

## 2017-06-09 PROCEDURE — 1159F MED LIST DOCD IN RCRD: CPT | Mod: S$GLB,,, | Performed by: NURSE PRACTITIONER

## 2017-06-09 NOTE — PROGRESS NOTES
Subjective:       Patient ID: Sharona Acosta is a 81 y.o. female.    Chief Complaint: Transitional Care    81-year-old female presents to the clinic today for hospital follow-up.  She was hospitalized at [facility] April 23 to April 26 after fall.  She developed 3 days of progressive weakness and confusion.  She normally ambulates independently but she has not been with her walker for the last 3 days.  She had a fall was taken [facility] was treated for urinary tract infection with IV antibiotics and released 3 days ago.  No oral antibiotics continued as an outpatient.  Patient currently resides in independent living facility is concerned that she is unable to take care of herself at home at this time.  Report of subjective fever.  The history is otherwise limited due to the condition of the patient.  In the emergency room routine laboratory studies and chest x-ray were obtained.  There is evidence of ill-defined bilateral lower interstitial opacities concerning for healthcare associated pneumonia.  She also was hypokalemia.  Her mental status did not improve while she was in the emergency room.  She is also noted to have a slightly elevated troponin level.  She was found to have hospital acquired pneumonia likely bacterial with sepsis based on chest x-ray and leukocytosis, temp max was 100.9 and tachycardia. Empiric antibiotics with vancomycin and Cefepime were started.  Blood cultures were collected.  She had quick symptomatic improvement.  Antibiotics were deescalated to moxifloxacin on 4/30.  Regimen completed while inpatient.  Patient also with watery diarrhea confirmed to be C. difficile.  She was started on Flagyl and patient finished the course of treatment, diarrhea resolved and patient confirms firm stool.  She has slightly elevated troponin.  Cardiology was consulted.  Echo Showed Ejection Fracture 55% Mild to Moderate Aortic Stenosis and Possible 1.5-1.1 Cm Mass Noted in the RA/IVC.  CHINTAN on 5/5   to Further Investigate Showed No Evidence of Mass.  PT and OT Was Recommended with Home Health on Discharge.  However, Patient Required 24 assistance which is not provided at her independent living facility. Patient Was Discharged Home to [facility] for 20 Days.  Presently She States Her Diarrhea Has Now Resolved.  Stool Specimens Were Dropped off Yesterday and the Results Are Still Pending.  She Says She Thinks She Is Getting Better with Her Physical Therapy.  She Has Been on a Low Residue Diet.  She Is Eating and Drinking without Any Difficulty.  She Denies Any Abdominal Pain, Nausea, Vomiting, or Diarrhea.  She Had Another Formed Diarrhea Stool This Morning.  She Is Currently Back in Her Independent Living Facility with Her Daughter Staying Every Day.      Past Medical History:   Diagnosis Date    Alcohol abuse     Arthritis     osteoarthritis    Atherosclerosis of aorta     noted on lumbar X-ray 4/1/2013    Back pain     H/O: compression fracture of spine     H/O: stroke 1997    h/o stroke more than 15 yrs ago with no residual    Hyperlipidemia     Hypertension     Hypothyroidism     Leukocytosis     Neutrophilic leukocytosis 4/29/2017    Normal vaginal delivery     x2    Osteopenia     Sepsis due to HCAP pneumonia 4/29/2017    Thyroid disease     hypothyroidism    Urinary incontinence     Vitamin B 12 deficiency     Vitamin D deficiency      Past Surgical History:   Procedure Laterality Date    BREAST BIOPSY      FIXATION KYPHOPLASTY  Sept. 2012    HYSTERECTOMY      JOINT REPLACEMENT      right hip replacement    TOTAL HIP ARTHROPLASTY        reports that she quit smoking about 20 years ago. Her smoking use included Cigarettes. She has never used smokeless tobacco. She reports that she drinks about 12.6 oz of alcohol per week . She reports that she does not use drugs.  Review of Systems   Constitutional: Negative for chills and fever.   Respiratory: Negative for cough, shortness  of breath and wheezing.    Cardiovascular: Negative for chest pain, palpitations and leg swelling.   Gastrointestinal: Negative for abdominal pain, diarrhea, nausea and vomiting.   Musculoskeletal: Positive for gait problem.   Neurological: Negative for dizziness, light-headedness and headaches.       Objective:      Physical Exam   Constitutional: She appears well-developed and well-nourished. No distress.   Eyes: Conjunctivae and EOM are normal. Pupils are equal, round, and reactive to light. Right eye exhibits no discharge. Left eye exhibits no discharge. No scleral icterus.   Cardiovascular: Normal rate, regular rhythm and normal heart sounds.  Exam reveals no gallop and no friction rub.    No murmur heard.  Pulmonary/Chest: Effort normal and breath sounds normal. No respiratory distress. She has no wheezes. She has no rales.   Abdominal: Soft. Bowel sounds are normal. There is no tenderness.   Musculoskeletal: Normal range of motion. She exhibits no edema.   Neurological: She is alert.   Skin: Skin is warm and dry. She is not diaphoretic.   Psychiatric: She has a normal mood and affect.       Assessment:       1. Hospital discharge follow-up    2. Atherosclerosis of aorta    3. H/O: stroke    4. History of vertebral fracture    5. Hyperlipidemia, unspecified hyperlipidemia type    6. Hypertension, benign    7. Acquired hypothyroidism    8. Physical deconditioning    9. Vitamin D deficiency        Plan:         Hospital discharge follow-up  - await stool studies results    Atherosclerosis of aorta  - Stable / Asymptomatic is on blood pressure and cholesterol lowering medications    H/O: stroke  - stable    History of vertebral fracture  - stable     Hyperlipidemia, unspecified hyperlipidemia type  -The current medical regimen is effective;  continue present plan and medications.    Hypertension, benign  - The current medical regimen is effective;  continue present plan and medications.    Acquired  hypothyroidism  - The current medical regimen is effective;  continue present plan and medications.    Physical deconditioning  - currently in physical therapy and receiving home health     Vitamin D deficiency  - The current medical regimen is effective;  continue present plan and medications.        Await stool studies

## 2017-06-12 ENCOUNTER — TELEPHONE (OUTPATIENT)
Dept: FAMILY MEDICINE | Facility: CLINIC | Age: 82
End: 2017-06-12

## 2017-06-12 DIAGNOSIS — A04.72 C. DIFFICILE DIARRHEA: Primary | ICD-10-CM

## 2017-06-12 RX ORDER — METRONIDAZOLE 500 MG/1
500 TABLET ORAL 3 TIMES DAILY
Qty: 42 TABLET | Refills: 0 | Status: SHIPPED | OUTPATIENT
Start: 2017-06-12 | End: 2017-06-26

## 2017-06-12 NOTE — TELEPHONE ENCOUNTER
Please call patient/daughter: stool studies are positive for C.diff infection. We need to treat her again with flagyl for 14 days. She needs to let us know if the diarrhea does not resolve or recurs after treatment is completed.

## 2017-06-12 NOTE — TELEPHONE ENCOUNTER
Spoke w/patient's daughter Nona, informed of patient's   Results and recommendations and Rx sent to pharmacy. Verbalized understanding.

## 2017-06-14 ENCOUNTER — TELEPHONE (OUTPATIENT)
Dept: FAMILY MEDICINE | Facility: CLINIC | Age: 82
End: 2017-06-14

## 2017-06-14 NOTE — TELEPHONE ENCOUNTER
----- Message from Yasmani RO Jessica sent at 6/14/2017 11:33 AM CDT -----  Contact: Lab Client Services  Hi my name is Yasmani I work in the lab client services. We had a problem with one of the labs on your patient if you could please call us at [phone] or [phone] that would be great. ANYONE in my department can help. Thank You.

## 2017-06-14 NOTE — TELEPHONE ENCOUNTER
I called and spoke with Yasmani in the lab and when the patient turn in her stool cards on one of them there was not stool to perform one of them correctly.Thanks

## 2017-06-15 ENCOUNTER — PATIENT MESSAGE (OUTPATIENT)
Dept: FAMILY MEDICINE | Facility: CLINIC | Age: 82
End: 2017-06-15

## 2017-07-18 DIAGNOSIS — I10 HYPERTENSION, BENIGN: ICD-10-CM

## 2017-07-18 DIAGNOSIS — M85.80 OSTEOPENIA: ICD-10-CM

## 2017-07-18 DIAGNOSIS — E78.5 HYPERLIPIDEMIA: ICD-10-CM

## 2017-07-18 RX ORDER — ALENDRONATE SODIUM 70 MG/1
TABLET ORAL
Qty: 12 TABLET | Refills: 0 | Status: SHIPPED | OUTPATIENT
Start: 2017-07-18 | End: 2017-10-31 | Stop reason: SDUPTHER

## 2017-07-18 RX ORDER — VERAPAMIL HYDROCHLORIDE 240 MG/1
CAPSULE, EXTENDED RELEASE ORAL
Qty: 90 CAPSULE | Refills: 0 | Status: SHIPPED | OUTPATIENT
Start: 2017-07-18 | End: 2017-10-31 | Stop reason: SDUPTHER

## 2017-07-18 RX ORDER — LEVOTHYROXINE SODIUM 75 UG/1
TABLET ORAL
Qty: 90 TABLET | Refills: 0 | Status: SHIPPED | OUTPATIENT
Start: 2017-07-18 | End: 2017-10-31 | Stop reason: SDUPTHER

## 2017-07-18 RX ORDER — SIMVASTATIN 40 MG/1
TABLET, FILM COATED ORAL
Qty: 90 TABLET | Refills: 0 | Status: SHIPPED | OUTPATIENT
Start: 2017-07-18 | End: 2018-01-28 | Stop reason: SDUPTHER

## 2017-10-31 DIAGNOSIS — M85.80 OSTEOPENIA: ICD-10-CM

## 2017-10-31 DIAGNOSIS — I10 HYPERTENSION, BENIGN: ICD-10-CM

## 2017-11-01 RX ORDER — LEVOTHYROXINE SODIUM 75 UG/1
TABLET ORAL
Qty: 90 TABLET | Refills: 0 | Status: SHIPPED | OUTPATIENT
Start: 2017-11-01 | End: 2018-01-28 | Stop reason: SDUPTHER

## 2017-11-01 RX ORDER — ALENDRONATE SODIUM 70 MG/1
TABLET ORAL
Qty: 12 TABLET | Refills: 0 | Status: SHIPPED | OUTPATIENT
Start: 2017-11-01 | End: 2018-01-28 | Stop reason: SDUPTHER

## 2017-11-01 RX ORDER — VERAPAMIL HYDROCHLORIDE 240 MG/1
CAPSULE, EXTENDED RELEASE ORAL
Qty: 90 CAPSULE | Refills: 0 | Status: SHIPPED | OUTPATIENT
Start: 2017-11-01 | End: 2018-01-28 | Stop reason: SDUPTHER

## 2017-12-24 ENCOUNTER — HOSPITAL ENCOUNTER (EMERGENCY)
Facility: HOSPITAL | Age: 82
Discharge: HOME | End: 2017-12-24
Attending: EMERGENCY MEDICINE
Payer: MEDICARE

## 2017-12-24 VITALS
RESPIRATION RATE: 18 BRPM | OXYGEN SATURATION: 100 % | HEART RATE: 76 BPM | WEIGHT: 109 LBS | SYSTOLIC BLOOD PRESSURE: 178 MMHG | HEIGHT: 63 IN | BODY MASS INDEX: 19.31 KG/M2 | TEMPERATURE: 99 F | DIASTOLIC BLOOD PRESSURE: 82 MMHG

## 2017-12-24 DIAGNOSIS — M25.529 ELBOW PAIN: ICD-10-CM

## 2017-12-24 DIAGNOSIS — M25.519 SHOULDER PAIN: ICD-10-CM

## 2017-12-24 DIAGNOSIS — S51.811A LACERATION OF RIGHT FOREARM, INITIAL ENCOUNTER: Primary | ICD-10-CM

## 2017-12-24 PROCEDURE — 25000003 PHARM REV CODE 250: Performed by: EMERGENCY MEDICINE

## 2017-12-24 PROCEDURE — 12034 INTMD RPR S/TR/EXT 7.6-12.5: CPT

## 2017-12-24 PROCEDURE — 99284 EMERGENCY DEPT VISIT MOD MDM: CPT | Mod: 25

## 2017-12-24 RX ORDER — LIDOCAINE HYDROCHLORIDE 20 MG/ML
20 INJECTION, SOLUTION INFILTRATION; PERINEURAL
Status: COMPLETED | OUTPATIENT
Start: 2017-12-24 | End: 2017-12-24

## 2017-12-24 RX ADMIN — LIDOCAINE HYDROCHLORIDE 20 ML: 20 INJECTION, SOLUTION INFILTRATION; PERINEURAL at 11:12

## 2017-12-24 RX ADMIN — BACITRACIN, NEOMYCIN, POLYMYXIN B 2 EACH: 400; 3.5; 5 OINTMENT TOPICAL at 11:12

## 2017-12-24 NOTE — ED TRIAGE NOTES
"Pt. Reports that she had a trip and fall while getting off the elevator, pt. Does use a walker to ambulate. Pt. Reports that she did not experience any dizziness while exiting the elevator, pt. States "I think I just missed a step." Pt. Did not lose consciousness, she is oriented to person and place but not to time. Pt. In no acute distress.  "

## 2017-12-24 NOTE — ED PROVIDER NOTES
"Encounter Date: 12/24/2017    SCRIBE #1 NOTE: I, Svetlana Mena, am scribing for, and in the presence of, Paolo Gonzalez MD. Other sections scribed: HPI/ROS.       History     Chief Complaint   Patient presents with    Laceration     Pt reports cutting right arm on elevator rail at [facility]     CC: Laceration    Pt is a 82 y.o. Female former smoker w/ HTN, HLD, hypothyroid disease, osteopenia, osteoarthritis, urinary incontinence, back pain 2nd/to spine compression fracture, and hx of hip replacement, kyphoplasty, hysterectomy, breast biopsy, stroke (>15 years ago), alcohol abuse, and sepsis 2nd/to HCAP presenting to the ED c/o moderate (4/10), acute onset R forearm pain 2nd/to laceration from elevator rail at [facility]. Pt states the fall was a stumble while turning; she denies dizziness or lightheadedness at the time of the fall. Pt reports very minor pain to the R shoulder ("barely") that is non-positional.    Pt denies head trauma, px with breathing, hip/knee/ankle/leg pain, abdominal pain, CP/SOB, elbow pain, or any bony tenderness. Pt reports no further symptoms.      The history is provided by the patient.     Review of patient's allergies indicates:   Allergen Reactions    Lipitor [atorvastatin] Nausea Only     Past Medical History:   Diagnosis Date    Alcohol abuse     Arthritis     osteoarthritis    Atherosclerosis of aorta     noted on lumbar X-ray 4/1/2013    Back pain     H/O: compression fracture of spine     H/O: stroke 1997    h/o stroke more than 15 yrs ago with no residual    Hyperlipidemia     Hypertension     Hypothyroidism     Leukocytosis     Neutrophilic leukocytosis 4/29/2017    Normal vaginal delivery     x2    Osteopenia     Sepsis due to HCAP pneumonia 4/29/2017    Thyroid disease     hypothyroidism    Urinary incontinence     Vitamin B 12 deficiency     Vitamin D deficiency      Past Surgical History:   Procedure Laterality Date    BREAST " BIOPSY      FIXATION KYPHOPLASTY  Sept. 2012    HYSTERECTOMY      JOINT REPLACEMENT      right hip replacement    TOTAL HIP ARTHROPLASTY       Family History   Problem Relation Age of Onset    Hypertension Mother     Stroke Mother     Hypertension Father     Diabetes Neg Hx     Colon cancer Neg Hx     Breast cancer Neg Hx      Social History   Substance Use Topics    Smoking status: Former Smoker     Types: Cigarettes     Quit date: 5/15/1997    Smokeless tobacco: Never Used    Alcohol use 12.6 oz/week     21 Shots of liquor per week      Comment: occasional      Review of Systems   Constitutional: Negative for fever.   HENT: Negative for ear pain.    Eyes: Negative for pain.   Respiratory: Negative for shortness of breath.    Cardiovascular: Negative for chest pain.   Gastrointestinal: Negative for abdominal pain.   Genitourinary: Negative for dysuria.   Musculoskeletal: Positive for arthralgias (+) R shoulder (-) hip, knee, ankle, elbow.   Skin: Positive for wound.   Neurological: Negative for dizziness, weakness, light-headedness and headaches.       Physical Exam     Initial Vitals [12/24/17 0904]   BP Pulse Resp Temp SpO2   (!) 143/63 70 18 97.9 °F (36.6 °C) 97 %      MAP       89.67         Physical Exam    Nursing note and vitals reviewed.  Constitutional: She appears well-developed and well-nourished. No distress.   HENT:   Head: Normocephalic and atraumatic.   Eyes: EOM are normal. Pupils are equal, round, and reactive to light.   Neck: Normal range of motion. Neck supple. No thyromegaly present. No JVD present.   Cardiovascular: Normal rate, regular rhythm, normal heart sounds and intact distal pulses. Exam reveals no gallop and no friction rub.    No murmur heard.  Pulmonary/Chest: Breath sounds normal. No respiratory distress. She exhibits no tenderness.   Abdominal: Soft. Bowel sounds are normal. There is no tenderness.   Musculoskeletal: Normal range of motion. She exhibits no edema or  tenderness.   Neurological: She is alert and oriented to person, place, and time. She has normal strength.   Skin: Skin is warm and dry. Capillary refill takes less than 2 seconds.   Large skin laceration to the proximal lateral forearm         ED Course   Lac Repair  Date/Time: 12/24/2017 11:01 AM  Performed by: MONICA CLIFTON  Authorized by: YANET MART   Consent Done: Yes  Consent: Verbal consent obtained.  Consent given by: patient  Location: R elbow.  Laceration length: 10 cm  Foreign bodies: no foreign bodies  Tendon involvement: none  Nerve involvement: none  Anesthesia: local infiltration    Anesthesia:  Local Anesthetic: lidocaine 1% without epinephrine  Anesthetic total: 5 mL  Patient sedated: no  Preparation: Patient was prepped and draped in the usual sterile fashion.  Irrigation solution: saline  Irrigation method: jet lavage  Amount of cleaning: extensive  Debridement: none  Degree of undermining: none  Skin closure: 3-0 nylon  Fascia closure: 3-0 Vicryl  Number of sutures: 4 Vicryl + 14 Nylon = 18 TOTAL.  Technique: simple  Approximation: loose  Approximation difficulty: complex  Dressing: 4x4 sterile gauze, bulky dressing and antibiotic ointment  Patient tolerance: Patient tolerated the procedure well with no immediate complications        Labs Reviewed - No data to display       X-Rays:   Independently Interpreted Readings:   Other Readings:  Right shoulder and right elbow x-ray shows no fracture dislocation or foreign body.               Scribe Attestation:   Scribe #1: I performed the above scribed service and the documentation accurately describes the services I performed. I attest to the accuracy of the note.    Attending Attestation:     Physician Attestation Statement for NP/PA:   I have conducted a face to face encounter with this patient in addition to the NP/PA, due to Medical Complexity    Other NP/PA Attestation Additions:      Medical Decision Making: Patient was seen and  evaluated by me.  The nurse practitioner's name on the chart because he performed the laceration repair.       Physician Attestation for Scribe:  Physician Attestation Statement for Scribe #1: I, Paolo Gonzalez MD, reviewed documentation, as scribed by Svetlana Mena in my presence, and it is both accurate and complete.                 ED Course      Clinical Impression:   The primary encounter diagnosis was Laceration of right forearm, initial encounter. Diagnoses of Elbow pain and Shoulder pain were also pertinent to this visit.                           Roderick Hutton PA-C  12/24/17 1101       Paolo Gonzalez MD  12/24/17 7809

## 2018-01-01 NOTE — ASSESSMENT & PLAN NOTE
As above   Routine CCHD, hearing and bilirubin screening prior to d/c  Continue to encourage breastfeeding. Mother declines lactation at this time.  Parents decline circumcision

## 2018-01-03 ENCOUNTER — OFFICE VISIT (OUTPATIENT)
Dept: FAMILY MEDICINE | Facility: CLINIC | Age: 83
End: 2018-01-03
Payer: MEDICARE

## 2018-01-03 VITALS
WEIGHT: 115.63 LBS | HEART RATE: 72 BPM | SYSTOLIC BLOOD PRESSURE: 140 MMHG | BODY MASS INDEX: 20.49 KG/M2 | TEMPERATURE: 98 F | OXYGEN SATURATION: 98 % | DIASTOLIC BLOOD PRESSURE: 72 MMHG | HEIGHT: 63 IN

## 2018-01-03 DIAGNOSIS — J18.9 PNEUMONIA DUE TO INFECTIOUS ORGANISM, UNSPECIFIED LATERALITY, UNSPECIFIED PART OF LUNG: ICD-10-CM

## 2018-01-03 DIAGNOSIS — Z48.02 VISIT FOR SUTURE REMOVAL: Primary | ICD-10-CM

## 2018-01-03 DIAGNOSIS — I10 HYPERTENSION, BENIGN: ICD-10-CM

## 2018-01-03 DIAGNOSIS — E78.5 HYPERLIPIDEMIA, UNSPECIFIED HYPERLIPIDEMIA TYPE: ICD-10-CM

## 2018-01-03 PROCEDURE — 99499 UNLISTED E&M SERVICE: CPT | Mod: S$GLB,,, | Performed by: NURSE PRACTITIONER

## 2018-01-03 PROCEDURE — 99999 PR PBB SHADOW E&M-EST. PATIENT-LVL IV: CPT | Mod: PBBFAC,,, | Performed by: NURSE PRACTITIONER

## 2018-01-03 PROCEDURE — 99214 OFFICE O/P EST MOD 30 MIN: CPT | Mod: S$GLB,,, | Performed by: NURSE PRACTITIONER

## 2018-01-03 RX ORDER — AZITHROMYCIN 250 MG/1
TABLET, FILM COATED ORAL
Qty: 6 TABLET | Refills: 0 | Status: SHIPPED | OUTPATIENT
Start: 2018-01-03 | End: 2018-03-05 | Stop reason: ALTCHOICE

## 2018-01-03 RX ORDER — MUPIROCIN 20 MG/G
OINTMENT TOPICAL DAILY
Qty: 15 G | Refills: 1 | Status: SHIPPED | OUTPATIENT
Start: 2018-01-03 | End: 2018-01-13

## 2018-01-03 NOTE — PATIENT INSTRUCTIONS
Clean with Hibiclens or Normal saline and then apply antibioitc ointment and dress with telfa, wrap with cling and do not apply tape to her skin  Be sure to complete all of the antibiotics   Mucinex DM as directed

## 2018-01-03 NOTE — PROGRESS NOTES
Subjective:       Patient ID: Sharona Acosta is a 82 y.o. female.    Chief Complaint: Suture / Staple Removal (Right fore arm  ); Chest Congestion (1 week); and Cough (1 week)    82-year-old female presents to the clinic today for emergency room follow-up.  She was seen on the emergency room on 12/24/2017 after experiencing acute onset of right forearm laceration from elevator rail at the [facility].  Patient states the fall was a stumble while turning.  She denies any dizziness or lightheadedness at the time of the fall. Patient reports very minor pain to the right shoulder that is non-positional.  The patient denied any head trauma, hip/ knee/ ankle/leg pain, abdominal pain, chest pain, shortness breath, elbow, or any bone tenderness.  X-ray the right shoulder and right elbow showed no fracture, dislocation, or foreign body.  She had a 10 cm laceration to the right forearm which was U-shaped.  It was sutured with 4 Vicryl and14 nylon sutures. She also complains of a lot of coughing and chest congestion for the last 4 days.  She denies any fever, chills, sore throat, sinus congestion, ear pain, shortness of breath, wheezing, abdominal pain, nausea, vomiting, or diarrhea.  She denies any headaches, dizziness, or blurred vision.  She denies any cardiac chest pain, heart palpitations, shortness breath, or swelling to lower extremities.      Past Medical History:   Diagnosis Date    Alcohol abuse     Arthritis     osteoarthritis    Atherosclerosis of aorta     noted on lumbar X-ray 4/1/2013    Back pain     H/O: compression fracture of spine     H/O: stroke 1997    h/o stroke more than 15 yrs ago with no residual    Hyperlipidemia     Hypertension     Hypothyroidism     Leukocytosis     Neutrophilic leukocytosis 4/29/2017    Normal vaginal delivery     x2    Osteopenia     Sepsis due to HCAP pneumonia 4/29/2017    Thyroid disease     hypothyroidism    Urinary incontinence     Vitamin  B 12 deficiency     Vitamin D deficiency      Past Surgical History:   Procedure Laterality Date    BREAST BIOPSY      FIXATION KYPHOPLASTY  Sept.  2012    HYSTERECTOMY      JOINT REPLACEMENT      right hip replacement    TOTAL HIP ARTHROPLASTY        reports that she quit smoking about 20 years ago. Her smoking use included Cigarettes. She has never used smokeless tobacco. She reports that she drinks about 12.6 oz of alcohol per week . She reports that she does not use drugs.  Review of Systems   Constitutional: Negative for chills and fever.   HENT: Negative for congestion, ear discharge, ear pain, postnasal drip, rhinorrhea, sinus pressure, sneezing and sore throat.    Eyes: Negative for pain, discharge and itching.   Respiratory: Positive for cough. Negative for shortness of breath and wheezing.    Cardiovascular: Negative for chest pain, palpitations and leg swelling.   Gastrointestinal: Negative for abdominal pain, diarrhea, nausea and vomiting.   Musculoskeletal: Negative for back pain, neck pain and neck stiffness.   Skin: Positive for wound. Negative for rash.   Neurological: Negative for dizziness, light-headedness and headaches.   Hematological: Negative for adenopathy.       Objective:      Physical Exam   Constitutional: She is oriented to person, place, and time. She appears well-developed and well-nourished. No distress.   HENT:   Head: Normocephalic and atraumatic.   Right Ear: External ear normal.   Left Ear: External ear normal.   Nose: Nose normal.   Mouth/Throat: Oropharynx is clear and moist. No oropharyngeal exudate.   Eyes: Conjunctivae and EOM are normal. Pupils are equal, round, and reactive to light. Right eye exhibits no discharge. Left eye exhibits no discharge. No scleral icterus.   Neck: Normal range of motion. Neck supple.   Cardiovascular: Normal rate and regular rhythm.  Exam reveals no gallop and no friction rub.    No murmur heard.  Pulmonary/Chest: Effort normal. No  respiratory distress. She has no wheezes. She has no rales.   Rhonchi noted left lower lobe no wheezing    Abdominal: Soft. Bowel sounds are normal. There is no tenderness.   Musculoskeletal: Normal range of motion. She exhibits no edema.   Lymphadenopathy:     She has no cervical adenopathy.   Neurological: She is alert and oriented to person, place, and time.   Skin: Skin is warm and dry. No rash noted. She is not diaphoretic.   Right forearm U shaped laceration with 14 stitches intact removed without any difficulty a lot of bruising and blood noted under the skin dressed with antibiotic ointment Telfa and cling tolerated procedure well    Psychiatric: She has a normal mood and affect.       Assessment:       1. Visit for suture removal    2. Pneumonia due to infectious organism, unspecified laterality, unspecified part of lung    3. Hypertension, benign    4. Hyperlipidemia, unspecified hyperlipidemia type        Plan:         Visit for suture removal  -     mupirocin (BACTROBAN) 2 % ointment; Apply topically once daily.  Dispense: 15 g; Refill: 1    Pneumonia due to infectious organism, unspecified laterality, unspecified part of lung  -     azithromycin (ZITHROMAX Z-GLENYS) 250 MG tablet; Take 2 pills day 1, then 1 pill day 2-5.  Dispense: 6 tablet; Refill: 0    Hypertension, benign  - The current medical regimen is effective;  continue present plan and medications.    Hyperlipidemia, unspecified hyperlipidemia type  - The current medical regimen is effective;  continue present plan and medications.

## 2018-01-11 ENCOUNTER — TELEPHONE (OUTPATIENT)
Dept: FAMILY MEDICINE | Facility: CLINIC | Age: 83
End: 2018-01-11

## 2018-01-11 NOTE — TELEPHONE ENCOUNTER
----- Message from Dee Hernandez sent at 1/11/2018  3:27 PM CST -----  Contact: [phone] Adali   Pt daughter said the pt condition is not getting better Please call  at your earliest convenience.  Thanks!

## 2018-01-11 NOTE — TELEPHONE ENCOUNTER
Spoke with Adali - concerned that patient completed 5 days of antibiotics and is still coughing. She is taking mucinex DM but not coughing up much. She is mostly sedentary due to recent fall. Patient without fever and seems to be breathing comfortably (no SOB, wheezing, or increased work of breathing).  Reassured called that cough may last several weeks. Antibiotics will be in system for full 10 days. Can continue with mucinex DM. If no SOB, wheezing or increased work of breathing then we should just observe. Can try some chest percussion therapy, if desired.  Caller reassured. Will call back if she has any further concerns. Recommend office visit if patient does not improve.

## 2018-01-25 ENCOUNTER — PES CALL (OUTPATIENT)
Dept: ADMINISTRATIVE | Facility: CLINIC | Age: 83
End: 2018-01-25

## 2018-01-28 DIAGNOSIS — I10 HYPERTENSION, BENIGN: ICD-10-CM

## 2018-01-28 DIAGNOSIS — M85.80 OSTEOPENIA: ICD-10-CM

## 2018-01-28 DIAGNOSIS — E78.5 HYPERLIPIDEMIA: ICD-10-CM

## 2018-01-28 RX ORDER — SIMVASTATIN 40 MG/1
TABLET, FILM COATED ORAL
Qty: 90 TABLET | Refills: 0 | Status: SHIPPED | OUTPATIENT
Start: 2018-01-28 | End: 2018-02-04 | Stop reason: SDUPTHER

## 2018-01-29 RX ORDER — VERAPAMIL HYDROCHLORIDE 240 MG/1
CAPSULE, EXTENDED RELEASE ORAL
Qty: 90 CAPSULE | Refills: 0 | Status: SHIPPED | OUTPATIENT
Start: 2018-01-29 | End: 2018-03-05 | Stop reason: SDUPTHER

## 2018-01-29 RX ORDER — ALENDRONATE SODIUM 70 MG/1
TABLET ORAL
Qty: 12 TABLET | Refills: 0 | Status: SHIPPED | OUTPATIENT
Start: 2018-01-29 | End: 2018-03-05 | Stop reason: SDUPTHER

## 2018-01-29 RX ORDER — LEVOTHYROXINE SODIUM 75 UG/1
TABLET ORAL
Qty: 90 TABLET | Refills: 0 | Status: SHIPPED | OUTPATIENT
Start: 2018-01-29 | End: 2018-03-05 | Stop reason: SDUPTHER

## 2018-02-04 DIAGNOSIS — E78.5 HYPERLIPIDEMIA: ICD-10-CM

## 2018-02-05 RX ORDER — SIMVASTATIN 40 MG/1
TABLET, FILM COATED ORAL
Qty: 90 TABLET | Refills: 0 | Status: SHIPPED | OUTPATIENT
Start: 2018-02-05 | End: 2018-03-05 | Stop reason: SDUPTHER

## 2018-03-05 ENCOUNTER — OFFICE VISIT (OUTPATIENT)
Dept: FAMILY MEDICINE | Facility: CLINIC | Age: 83
End: 2018-03-05
Payer: MEDICARE

## 2018-03-05 VITALS
DIASTOLIC BLOOD PRESSURE: 70 MMHG | HEART RATE: 70 BPM | TEMPERATURE: 99 F | SYSTOLIC BLOOD PRESSURE: 142 MMHG | OXYGEN SATURATION: 95 % | BODY MASS INDEX: 23.67 KG/M2 | HEIGHT: 62 IN | WEIGHT: 128.63 LBS

## 2018-03-05 DIAGNOSIS — M85.80 OSTEOPENIA, UNSPECIFIED LOCATION: ICD-10-CM

## 2018-03-05 DIAGNOSIS — I70.0 ATHEROSCLEROSIS OF AORTA: ICD-10-CM

## 2018-03-05 DIAGNOSIS — I35.0 NONRHEUMATIC AORTIC VALVE STENOSIS: ICD-10-CM

## 2018-03-05 DIAGNOSIS — Z00.00 ROUTINE MEDICAL EXAM: Primary | ICD-10-CM

## 2018-03-05 DIAGNOSIS — G31.84 MILD COGNITIVE IMPAIRMENT: ICD-10-CM

## 2018-03-05 DIAGNOSIS — E03.9 ACQUIRED HYPOTHYROIDISM: ICD-10-CM

## 2018-03-05 DIAGNOSIS — E78.5 HYPERLIPIDEMIA, UNSPECIFIED HYPERLIPIDEMIA TYPE: ICD-10-CM

## 2018-03-05 DIAGNOSIS — Z23 NEED FOR SHINGLES VACCINE: ICD-10-CM

## 2018-03-05 DIAGNOSIS — I10 HYPERTENSION, BENIGN: ICD-10-CM

## 2018-03-05 DIAGNOSIS — Z23 NEED FOR TDAP VACCINATION: ICD-10-CM

## 2018-03-05 DIAGNOSIS — Z23 FLU VACCINE NEED: ICD-10-CM

## 2018-03-05 DIAGNOSIS — Z86.73 H/O: STROKE: ICD-10-CM

## 2018-03-05 PROBLEM — R79.89 ELEVATED TROPONIN: Status: RESOLVED | Noted: 2017-04-29 | Resolved: 2018-03-05

## 2018-03-05 PROBLEM — A49.8 CLOSTRIDIUM DIFFICILE INFECTION: Status: RESOLVED | Noted: 2017-05-02 | Resolved: 2018-03-05

## 2018-03-05 PROBLEM — R53.81 PHYSICAL DECONDITIONING: Status: RESOLVED | Noted: 2017-05-02 | Resolved: 2018-03-05

## 2018-03-05 PROBLEM — J15.9 BACTERIAL PNEUMONIA: Status: RESOLVED | Noted: 2017-04-29 | Resolved: 2018-03-05

## 2018-03-05 PROCEDURE — 99397 PER PM REEVAL EST PAT 65+ YR: CPT | Mod: S$GLB,,, | Performed by: INTERNAL MEDICINE

## 2018-03-05 PROCEDURE — 99499 UNLISTED E&M SERVICE: CPT | Mod: S$GLB,,, | Performed by: INTERNAL MEDICINE

## 2018-03-05 PROCEDURE — 99999 PR PBB SHADOW E&M-EST. PATIENT-LVL III: CPT | Mod: PBBFAC,,, | Performed by: INTERNAL MEDICINE

## 2018-03-05 PROCEDURE — 90662 IIV NO PRSV INCREASED AG IM: CPT | Mod: S$GLB,,, | Performed by: INTERNAL MEDICINE

## 2018-03-05 PROCEDURE — G0008 ADMIN INFLUENZA VIRUS VAC: HCPCS | Mod: S$GLB,,, | Performed by: INTERNAL MEDICINE

## 2018-03-05 RX ORDER — FOLIC ACID 1 MG/1
1 TABLET ORAL DAILY
Qty: 90 TABLET | Refills: 1 | Status: SHIPPED | OUTPATIENT
Start: 2018-03-05 | End: 2021-01-29

## 2018-03-05 RX ORDER — VERAPAMIL HYDROCHLORIDE 240 MG/1
240 CAPSULE, EXTENDED RELEASE ORAL DAILY
Qty: 90 CAPSULE | Refills: 1 | Status: SHIPPED | OUTPATIENT
Start: 2018-03-05 | End: 2018-09-24 | Stop reason: SDUPTHER

## 2018-03-05 RX ORDER — LEVOTHYROXINE SODIUM 75 UG/1
75 TABLET ORAL DAILY
Qty: 90 TABLET | Refills: 1 | Status: SHIPPED | OUTPATIENT
Start: 2018-03-05 | End: 2018-09-24 | Stop reason: SDUPTHER

## 2018-03-05 RX ORDER — ALENDRONATE SODIUM 70 MG/1
70 TABLET ORAL
Qty: 12 TABLET | Refills: 1 | Status: SHIPPED | OUTPATIENT
Start: 2018-03-05 | End: 2018-05-28 | Stop reason: SDUPTHER

## 2018-03-05 RX ORDER — FOLIC ACID 1 MG/1
1 TABLET ORAL DAILY
Refills: 0 | Status: CANCELLED
Start: 2018-03-05 | End: 2019-03-05

## 2018-03-05 RX ORDER — SIMVASTATIN 40 MG/1
40 TABLET, FILM COATED ORAL NIGHTLY
Qty: 90 TABLET | Refills: 1 | Status: SHIPPED | OUTPATIENT
Start: 2018-03-05 | End: 2018-11-17 | Stop reason: SDUPTHER

## 2018-03-05 NOTE — PROGRESS NOTES
Influenza vaccine administered, arin well. Instructed to wait 15mins for observation, no reaction noted @ time of discharge.

## 2018-03-05 NOTE — PROGRESS NOTES
HISTORY OF PRESENT ILLNESS:  Sharona Acosta is a 82 y.o. female who presents to the clinic today for a routine medical physical exam. Her last physical exam was approximately 2-3 years(s) ago.  She is accompanied by her daughter.        PAST MEDICAL HISTORY:  Past Medical History:   Diagnosis Date    Alcohol abuse     Arthritis     osteoarthritis    Atherosclerosis of aorta     noted on lumbar X-ray 4/1/2013    Back pain     H/O: compression fracture of spine     H/O: stroke 1997    h/o stroke more than 15 yrs ago with no residual    Hyperlipidemia     Hypertension     Hypothyroidism     Leukocytosis     Neutrophilic leukocytosis 4/29/2017    Normal vaginal delivery     x2    Osteopenia     Sepsis due to HCAP pneumonia 4/29/2017    Thyroid disease     hypothyroidism    Urinary incontinence     Vitamin B 12 deficiency     Vitamin D deficiency        PAST SURGICAL HISTORY:  Past Surgical History:   Procedure Laterality Date    BREAST BIOPSY      FIXATION KYPHOPLASTY  Sept. 2012    HYSTERECTOMY      JOINT REPLACEMENT      right hip replacement    TOTAL HIP ARTHROPLASTY         SOCIAL HISTORY:  Social History     Social History    Marital status:      Spouse name: N/A    Number of children: 2    Years of education: N/A     Occupational History    retired      Social History Main Topics    Smoking status: Former Smoker     Types: Cigarettes     Quit date: 5/15/1997    Smokeless tobacco: Never Used    Alcohol use No      Comment: former drinker    Drug use: No    Sexual activity: Not on file     Other Topics Concern    Not on file     Social History Narrative    No narrative on file       FAMILY HISTORY:  Family History   Problem Relation Age of Onset    Hypertension Mother     Stroke Mother     Hypertension Father     Diabetes Neg Hx     Colon cancer Neg Hx     Breast cancer Neg Hx        ALLERGIES AND MEDICATIONS: updated and reviewed.  Review of patient's allergies  indicates:   Allergen Reactions    Lipitor [atorvastatin] Nausea Only     Medication List with Changes/Refills   Current Medications    ASPIRIN 81 MG CHEW    Take 81 mg by mouth once daily.      MULTIVITAMIN (THERAGRAN) TABLET    Take 1 tablet by mouth once daily.    THIAMINE (VITAMIN B-1) 100 MG TABLET    Take 100 mg by mouth once daily.   Changed and/or Refilled Medications    Modified Medication Previous Medication    ALENDRONATE (FOSAMAX) 70 MG TABLET alendronate (FOSAMAX) 70 MG tablet       Take 1 tablet (70 mg total) by mouth every 7 days.    TAKE 1 TABLET BY MOUTH ONCE A WEEK, AS DIRECTED    FOLIC ACID (FOLVITE) 1 MG TABLET folic acid (FOLVITE) 1 MG tablet       Take 1 tablet (1 mg total) by mouth once daily.    Take 1 tablet (1 mg total) by mouth once daily.    LEVOTHYROXINE (SYNTHROID) 75 MCG TABLET levothyroxine (SYNTHROID) 75 MCG tablet       Take 1 tablet (75 mcg total) by mouth once daily.    TAKE 1 TABLET BY MOUTH EVERY DAY    SIMVASTATIN (ZOCOR) 40 MG TABLET simvastatin (ZOCOR) 40 MG tablet       Take 1 tablet (40 mg total) by mouth every evening.    TAKE 1 TABLET BY MOUTH EVERY EVENING    VERAPAMIL (VERELAN) 240 MG C24P verapamil (VERELAN) 240 MG C24P       Take 1 capsule (240 mg total) by mouth once daily.    TAKE 1 CAPSULE BY MOUTH EVERY DAY   Discontinued Medications    AZITHROMYCIN (ZITHROMAX Z-GLENYS) 250 MG TABLET    Take 2 pills day 1, then 1 pill day 2-5.    MAGNESIUM OXIDE (MAG-OX) 400 MG TABLET    Take 1 tablet (400 mg total) by mouth once daily.         CARE TEAM:  Patient Care Team:  Vianey Lee MD as PCP - General (Internal Medicine)           SCREENING HISTORY:  Health Maintenance       Date Due Completion Date    TETANUS VACCINE 06/25/1953 ---    Zoster Vaccine 06/25/1997 ---    Influenza Vaccine 08/01/2017 11/1/2016 (Done)    Override on 11/1/2016: Done    Override on 10/1/2013: Done    DEXA SCAN 09/01/2018 9/1/2016    Override on 2/9/2012: Done    Lipid Panel 04/30/2022 4/30/2017  "           REVIEW OF SYSTEMS:   The patient reports fair dietary habits.  The patient does exercise regularly -she does fitness classes at the [facility].  Review of Systems   Constitutional: Negative for chills, fatigue, fever and unexpected weight change.   HENT: Negative for congestion, ear discharge, ear pain and postnasal drip.    Eyes: Negative for photophobia, pain and visual disturbance.   Respiratory: Negative for cough, shortness of breath and wheezing.    Cardiovascular: Negative for chest pain, palpitations and leg swelling.   Gastrointestinal: Negative for abdominal pain, constipation, diarrhea, nausea and vomiting.   Endocrine: Negative for cold intolerance, heat intolerance, polydipsia and polyuria.   Genitourinary: Negative for dysuria, frequency and urgency.   Musculoskeletal: Negative for back pain, joint swelling and neck stiffness.   Skin: Negative for rash.   Neurological: Negative for weakness and headaches.   Psychiatric/Behavioral: Negative for dysphoric mood and sleep disturbance. The patient is not nervous/anxious.         - she is having some problems with memory               Physical Examination:   Vitals:    03/05/18 1537   BP: (!) 142/70   Pulse: 70   Temp: 98.5 °F (36.9 °C)     Weight: 58.3 kg (128 lb 10.2 oz)   Height: 5' 2" (157.5 cm)   Body mass index is 23.53 kg/m².    General appearance - alert, well appearing, and in no distress, normal appearing weight and pleasant elderly female seated in a wheelchair  Mental status - alert, oriented to person, place; normal mood, behavior, speech, dress, motor activity, and fair memory  Eyes - pupils equal and reactive, extraocular eye movements intact, sclera anicteric  Mouth - mucous membranes moist, pharynx normal without lesions  Neck - supple, no significant adenopathy, carotids upstroke normal bilaterally, no bruits, thyroid exam: thyroid is normal in size without nodules or tenderness  Lymphatics - no palpable " lymphadenopathy  Chest - clear to auscultation, no wheezes, rales or rhonchi, symmetric air entry  Heart - normal rate and regular rhythm, no gallops noted  Abdomen - soft, nontender, nondistended, no masses or organomegaly  Breasts - not examined  Back exam - mild limited range of motion; in depth exam deferred  Neurological - alert, oriented, normal speech, no focal findings or movement disorder noted, cranial nerves II through XII intact  Musculoskeletal - no muscular tenderness noted, gait not observed  Extremities - no pedal edema noted  Skin - normal coloration and turgor, no rashes, no suspicious skin lesions noted      ASSESSMENT AND PLAN:  1. Routine medical exam  Counseled on age appropriate medical preventative services including age appropriate cancer screenings, age appropriate eye and dental exams, over all nutritional health, need for a consistent exercise regimen, and an over all push towards maintaining a vigorous and active lifestyle.  Counseled on age appropriate vaccines and discussed upcoming health care needs based on age/gender. Discussed good sleep hygiene and stress management.     2. Hypertension, benign  Discussed sodium restriction, maintaining ideal body weight and regular exercise program as physiologic means to achieve blood pressure control. The patient will strive towards this. The current medical regimen is effective;  continue present plan and medications. Recommended patient to check home readings to monitor and see me for followup as scheduled or sooner as needed. Patient was educated that both decongestant and anti-inflammatory medication may raise blood pressure.   - CBC auto differential; Future  - verapamil (VERELAN) 240 MG C24P; Take 1 capsule (240 mg total) by mouth once daily.  Dispense: 90 capsule; Refill: 1    3. Hyperlipidemia, unspecified hyperlipidemia type  We discussed low fat diet and regular exercise.The current medical regimen is effective;  continue present plan  "and medications.    - Lipid panel; Future  - simvastatin (ZOCOR) 40 MG tablet; Take 1 tablet (40 mg total) by mouth every evening.  Dispense: 90 tablet; Refill: 1  - Comprehensive metabolic panel; Future    4. Osteopenia, unspecified location  We discussed adequate calcium and vitamin D supplementation. We discussed fall precautions. She is up to date on her BMD. Continue fosamax.   - alendronate (FOSAMAX) 70 MG tablet; Take 1 tablet (70 mg total) by mouth every 7 days.  Dispense: 12 tablet; Refill: 1    5. Acquired hypothyroidism  Patient is clinically euthyroid. Continue current regimen.   - levothyroxine (SYNTHROID) 75 MCG tablet; Take 1 tablet (75 mcg total) by mouth once daily.  Dispense: 90 tablet; Refill: 1  - TSH; Future    6. H/O: stroke  Stable/asymptomatic.  We discussed risk factor reduction.     7. Atherosclerosis of aorta  Patient with Atherosclerosis of the Aorta.  Stable/asymptomatic. Currently stable on lipid lowering medication and b/p monitoring.     8. Nonrheumatic aortic valve stenosis  Stable. Asymptomatic. Observe.     9. Mild cognitive impairment  MMSE 3/5/2018   What is the (year), (season), (date), (day), (month)? 1   Where are we (state), (country), (town or city), (hospital), (floor)? 2   Name 3 common objects (eg. "apple", "table", "chemo"). Take 1 second to say each. Then ask the patient to repeat all 3. Give 1 point for each correct answer. Then repeat them until he/she learns all 3. Count trials and record. 3   Serial 7's backwards. Stop after 5 answers. (100,93,86,79,72) or alternatively  spell "WORLD" backwards. (D..L..R..O..W). The score is the number of letters in correct order. 4   Ask for the 3 common objects named earlier in the exam. Give 1 point for each correct answer. 0   Name a "pencil" and "watch." 2   Repeat the following: "No ifs, ands, or buts." 1   Follow a 3-stage command: "Take a paper in your right hand, fold it in half, & put it on the floor." 3   Read and obey " the following: (see paper exam) 1   Write a sentence. 1   Copy the following design: (see paper exam) 0   Total MMSE Score 18   Some recent data might be hidden           I'm not really sure of this is true dementia or if this is more related to her history of alcohol abuse.  I discussed with the patient and her daughter to perhaps see neurology in the future for further evaluation.  For now she will continue with vitamin supplementation.  Her alcohol intake has stopped.  She has not had any further falls.  She is living in an assisted living center with her .  - folic acid (FOLVITE) 1 MG tablet; Take 1 tablet (1 mg total) by mouth once daily.  Dispense: 90 tablet; Refill: 1    10. Flu vaccine need    - Influenza - High Dose (65+) (PF) (IM)    11. Need for Tdap vaccination  Patient declined.    12. Need for shingles vaccine  Patient declined.      I had a discussion with the patient today regarding advanced directives. Patient was given paperwork to complete living will and medical POA. Patient also given a copy of [place] for us to discuss at next office visit.           Follow-up in about 6 months (around 9/5/2018). or sooner as needed.

## 2018-03-29 ENCOUNTER — LAB VISIT (OUTPATIENT)
Dept: LAB | Facility: HOSPITAL | Age: 83
End: 2018-03-29
Attending: INTERNAL MEDICINE
Payer: MEDICARE

## 2018-03-29 DIAGNOSIS — E03.9 ACQUIRED HYPOTHYROIDISM: ICD-10-CM

## 2018-03-29 DIAGNOSIS — E78.5 HYPERLIPIDEMIA, UNSPECIFIED HYPERLIPIDEMIA TYPE: ICD-10-CM

## 2018-03-29 DIAGNOSIS — I10 HYPERTENSION, BENIGN: ICD-10-CM

## 2018-03-29 LAB
ALBUMIN SERPL BCP-MCNC: 3.9 G/DL
ALP SERPL-CCNC: 81 U/L
ALT SERPL W/O P-5'-P-CCNC: 7 U/L
ANION GAP SERPL CALC-SCNC: 5 MMOL/L
AST SERPL-CCNC: 16 U/L
BASOPHILS # BLD AUTO: 0.05 K/UL
BASOPHILS NFR BLD: 0.5 %
BILIRUB SERPL-MCNC: 0.6 MG/DL
BUN SERPL-MCNC: 17 MG/DL
CALCIUM SERPL-MCNC: 9.8 MG/DL
CHLORIDE SERPL-SCNC: 105 MMOL/L
CHOLEST SERPL-MCNC: 135 MG/DL
CHOLEST/HDLC SERPL: 2 {RATIO}
CO2 SERPL-SCNC: 29 MMOL/L
CREAT SERPL-MCNC: 1 MG/DL
DIFFERENTIAL METHOD: ABNORMAL
EOSINOPHIL # BLD AUTO: 0.2 K/UL
EOSINOPHIL NFR BLD: 1.8 %
ERYTHROCYTE [DISTWIDTH] IN BLOOD BY AUTOMATED COUNT: 13.8 %
EST. GFR  (AFRICAN AMERICAN): >60 ML/MIN/1.73 M^2
EST. GFR  (NON AFRICAN AMERICAN): 53.3 ML/MIN/1.73 M^2
GLUCOSE SERPL-MCNC: 82 MG/DL
HCT VFR BLD AUTO: 38.2 %
HDLC SERPL-MCNC: 68 MG/DL
HDLC SERPL: 50.4 %
HGB BLD-MCNC: 11.9 G/DL
IMM GRANULOCYTES # BLD AUTO: 0.04 K/UL
IMM GRANULOCYTES NFR BLD AUTO: 0.4 %
LDLC SERPL CALC-MCNC: 54.6 MG/DL
LYMPHOCYTES # BLD AUTO: 3.9 K/UL
LYMPHOCYTES NFR BLD: 40.7 %
MCH RBC QN AUTO: 30.4 PG
MCHC RBC AUTO-ENTMCNC: 31.2 G/DL
MCV RBC AUTO: 97 FL
MONOCYTES # BLD AUTO: 0.8 K/UL
MONOCYTES NFR BLD: 8.2 %
NEUTROPHILS # BLD AUTO: 4.6 K/UL
NEUTROPHILS NFR BLD: 48.4 %
NONHDLC SERPL-MCNC: 67 MG/DL
NRBC BLD-RTO: 0 /100 WBC
PLATELET # BLD AUTO: 270 K/UL
PMV BLD AUTO: 11.4 FL
POTASSIUM SERPL-SCNC: 4.7 MMOL/L
PROT SERPL-MCNC: 8 G/DL
RBC # BLD AUTO: 3.92 M/UL
SODIUM SERPL-SCNC: 139 MMOL/L
TRIGL SERPL-MCNC: 62 MG/DL
TSH SERPL DL<=0.005 MIU/L-ACNC: 3.52 UIU/ML
WBC # BLD AUTO: 9.56 K/UL

## 2018-03-29 PROCEDURE — 84443 ASSAY THYROID STIM HORMONE: CPT

## 2018-03-29 PROCEDURE — 85025 COMPLETE CBC W/AUTO DIFF WBC: CPT

## 2018-03-29 PROCEDURE — 80053 COMPREHEN METABOLIC PANEL: CPT

## 2018-03-29 PROCEDURE — 80061 LIPID PANEL: CPT

## 2018-03-29 PROCEDURE — 36415 COLL VENOUS BLD VENIPUNCTURE: CPT | Mod: PO

## 2018-05-01 ENCOUNTER — PES CALL (OUTPATIENT)
Dept: ADMINISTRATIVE | Facility: CLINIC | Age: 83
End: 2018-05-01

## 2018-05-28 DIAGNOSIS — M85.80 OSTEOPENIA, UNSPECIFIED LOCATION: ICD-10-CM

## 2018-05-28 RX ORDER — ALENDRONATE SODIUM 70 MG/1
70 TABLET ORAL
Qty: 12 TABLET | Refills: 0 | Status: SHIPPED | OUTPATIENT
Start: 2018-05-28 | End: 2018-10-01 | Stop reason: SDUPTHER

## 2018-06-20 ENCOUNTER — PES CALL (OUTPATIENT)
Dept: ADMINISTRATIVE | Facility: CLINIC | Age: 83
End: 2018-06-20

## 2018-06-21 ENCOUNTER — OFFICE VISIT (OUTPATIENT)
Dept: FAMILY MEDICINE | Facility: CLINIC | Age: 83
End: 2018-06-21
Payer: MEDICARE

## 2018-06-21 ENCOUNTER — HOSPITAL ENCOUNTER (OUTPATIENT)
Dept: RADIOLOGY | Facility: HOSPITAL | Age: 83
Discharge: HOME | End: 2018-06-21
Attending: INTERNAL MEDICINE
Payer: MEDICARE

## 2018-06-21 ENCOUNTER — APPOINTMENT (OUTPATIENT)
Dept: RADIOLOGY | Facility: HOSPITAL | Age: 83
End: 2018-06-21
Attending: INTERNAL MEDICINE
Payer: MEDICARE

## 2018-06-21 VITALS
HEIGHT: 62 IN | OXYGEN SATURATION: 96 % | SYSTOLIC BLOOD PRESSURE: 130 MMHG | HEART RATE: 90 BPM | BODY MASS INDEX: 23.55 KG/M2 | WEIGHT: 128 LBS | DIASTOLIC BLOOD PRESSURE: 86 MMHG | TEMPERATURE: 98 F

## 2018-06-21 DIAGNOSIS — R26.2 IMPAIRED AMBULATION: ICD-10-CM

## 2018-06-21 DIAGNOSIS — M79.605 LEFT LEG PAIN: ICD-10-CM

## 2018-06-21 DIAGNOSIS — M79.605 LEFT LEG PAIN: Primary | ICD-10-CM

## 2018-06-21 PROCEDURE — 99999 PR PBB SHADOW E&M-EST. PATIENT-LVL III: CPT | Mod: PBBFAC,,, | Performed by: INTERNAL MEDICINE

## 2018-06-21 PROCEDURE — 73502 X-RAY EXAM HIP UNI 2-3 VIEWS: CPT | Mod: TC,FY,PN,LT

## 2018-06-21 PROCEDURE — 73502 X-RAY EXAM HIP UNI 2-3 VIEWS: CPT | Mod: 26,LT,, | Performed by: RADIOLOGY

## 2018-06-21 PROCEDURE — 99215 OFFICE O/P EST HI 40 MIN: CPT | Mod: S$GLB,,, | Performed by: INTERNAL MEDICINE

## 2018-06-21 PROCEDURE — 3075F SYST BP GE 130 - 139MM HG: CPT | Mod: CPTII,S$GLB,, | Performed by: INTERNAL MEDICINE

## 2018-06-21 PROCEDURE — 3079F DIAST BP 80-89 MM HG: CPT | Mod: CPTII,S$GLB,, | Performed by: INTERNAL MEDICINE

## 2018-06-21 PROCEDURE — 99499 UNLISTED E&M SERVICE: CPT | Mod: S$GLB,,, | Performed by: INTERNAL MEDICINE

## 2018-06-21 NOTE — PROGRESS NOTES
Trouble walking      82-year-old white female new to me here with her daughter.  I'm meeting him both for the first time.  Current daughter is the primary caregiver.  Patient lives in the [place].  About 3 months ago she was seen by PCP but brought in by a different daughter forgot to mention the complaints of foot pain.  Since March she was using a walker and getting around all over the home.  Progressively over the past 3 months she has had complaints of pain with weightbearing mostly in the left leg area patient does have some underlying dementia such difficult for her to provide history.  Daughter says that when standing her left foot has now turned outward and she seems to limp referable to the pain in the left leg.  She did have a left lower extremity fracture repaired with a marcos.  Her pain does not appear to be in the foot noted she complain of left groin pain.  On exam today her pain seemed to be in the left calf muscle as well as in the left shin area where there is a lump where apparently she did have the fracture.  Because of these symptoms she has ambulated last and is now only transferring.      ROS:   CONST: weight stable.  No fevers or chills, no obvious recent falls and a been recorded, no bruising, no chest pains or shortness of breath       Past Medical History:   Diagnosis Date    Alcohol abuse     Arthritis     osteoarthritis    Atherosclerosis of aorta     noted on lumbar X-ray 4/1/2013    Back pain     H/O: compression fracture of spine     H/O: stroke 1997    h/o stroke more than 15 yrs ago with no residual    Hyperlipidemia     Hypertension     Hypothyroidism     Leukocytosis     Neutrophilic leukocytosis 4/29/2017    Normal vaginal delivery     x2    Osteopenia     Sepsis due to HCAP pneumonia 4/29/2017    Thyroid disease     hypothyroidism    Urinary incontinence     Vitamin B 12 deficiency     Vitamin D deficiency      Past Surgical History:   Procedure Laterality Date     BREAST BIOPSY      FIXATION KYPHOPLASTY  Sept. 2012    HYSTERECTOMY      JOINT REPLACEMENT      right hip replacement    TOTAL HIP ARTHROPLASTY       Social History     Social History    Marital status:      Spouse name: N/A    Number of children: 2    Years of education: N/A     Occupational History    retired      Social History Main Topics    Smoking status: Former Smoker     Types: Cigarettes     Quit date: 5/15/1997    Smokeless tobacco: Never Used    Alcohol use No      Comment: former drinker    Drug use: No    Sexual activity: Not on file     Other Topics Concern    Not on file     Social History Narrative    No narrative on file     family history includes Hypertension in her father and mother; Stroke in her mother.    Vital signs as above  Pleasant female in a wheelchair, answers questions, alert  Musculoskeletal: The left foot is deviated laterally somewhat even in the sitting position.  The left hip has significant reduced internal and external rotation although no pain with that movement.  The knee has good flexion and extension without pain.  She does have soft tissue pain over the left calf which is fairly significant although no induration in the muscle is completely soft with no venous changes skin changes rashes or warmth.  She is tender over the left anterior shin lower where there is a lump correlating to her prior fracture.  There is a palpable subcutaneous nodule over the distal left fibula which could be a screw under the skin.  The ankle on the left has good range of motion up and down without any pain.  There is no edema.  Right leg is good internal/external rotation of the hip knees and ankles without any pain.    Sharona was seen today for foot pain.    Diagnoses and all orders for this visit:    Left leg pain, pain seems to relate to the left lower extremity although she does have significant restriction in the left hip here she has had a hip fracture before but  family cannot remember which side nor can they see a scar.  By records it appears that she had the right hip fracture and replaced.  Discussed that without pain perhaps she is having progression of the left hip arthritis.  We discussed possibly of underlying left hip fracture even know still ambulatory.  We discussed x-rays of the left lower extremity since she has had fracture and repair there to make sure there is no breakdown or new fractures.  We also discussed getting a venous ultrasound since a lot of her pain seems to relate to the posterior calf on the left which could indicate DVT which could be primary or secondary to her recent debility and sedentary activity.  Discussed high probability she may need to see orthopedics for the restriction in the hip and probable hip arthritis on the left, to assess the prior left lower extremity fracture if there is any changes etc.  We'll hold off on providing any medication at this time as she does not have any acute pain otherwise except when ambulatory .  Spent a good deal of time discussing with atrial fibrillation and daughter about all of these possibilities.  We'll put in a referral to Dr[facility] since that is who the daughter sees.Total time over 45 minutes with over 50% counseling.        X-rays personally reviewed by me.  There is some significant joint space narrowing on the right severe osteoporosis.  The formal reading is not in yet but would not be surprised if there is some underlying fracture but nothing extremely obvious.  The left lower family does have the marcos in place and the old fracture in the tibia can be seen.  Unclear to me if there is any new fracture.      -     X-Ray Hip 2 or 3 views Left; Future  -     X-Ray Tibia Fibula 2 View Left; Future  -     VAS US Venous Leg Left; Future    Impaired ambulation  -     X-Ray Hip 2 or 3 views Left; Future  -     X-Ray Tibia Fibula 2 View Left; Future    Clinical note will  "be sensitive since patient is new to me and do not want any discrepancies in the available history to cause problems"This note will not be shared with the patient."  "

## 2018-06-25 DIAGNOSIS — A04.72 C. DIFFICILE DIARRHEA: ICD-10-CM

## 2018-06-25 RX ORDER — METRONIDAZOLE 500 MG/1
TABLET ORAL
Qty: 42 TABLET | Refills: 0 | OUTPATIENT
Start: 2018-06-25

## 2018-06-25 NOTE — TELEPHONE ENCOUNTER
Spoke with the pt, she don't know anything about the flagyl request.  Patient verbalized understandings.

## 2018-06-27 ENCOUNTER — TELEPHONE (OUTPATIENT)
Dept: FAMILY MEDICINE | Facility: CLINIC | Age: 83
End: 2018-06-27

## 2018-06-27 ENCOUNTER — HOSPITAL ENCOUNTER (OUTPATIENT)
Dept: RADIOLOGY | Facility: HOSPITAL | Age: 83
Discharge: HOME | End: 2018-06-27
Attending: INTERNAL MEDICINE
Payer: MEDICARE

## 2018-06-27 ENCOUNTER — PATIENT MESSAGE (OUTPATIENT)
Dept: FAMILY MEDICINE | Facility: CLINIC | Age: 83
End: 2018-06-27

## 2018-06-27 DIAGNOSIS — I82.412 ACUTE DEEP VEIN THROMBOSIS (DVT) OF FEMORAL VEIN OF LEFT LOWER EXTREMITY: Primary | ICD-10-CM

## 2018-06-27 DIAGNOSIS — M79.605 LEFT LEG PAIN: ICD-10-CM

## 2018-06-27 PROCEDURE — 93971 EXTREMITY STUDY: CPT | Mod: TC

## 2018-06-27 PROCEDURE — 93971 EXTREMITY STUDY: CPT | Mod: 26,,, | Performed by: RADIOLOGY

## 2018-06-27 NOTE — TELEPHONE ENCOUNTER
"Call daughter back .  Appears that the blood work not scheduled until Friday.  Patient current renal function slightly reduced making it difficult to determine proper initial dosing of the Xarelto especially with her fall risk.  Call daughter and advise.......          "We need that blood work in order to determine eventual proper dosing of the blood thinner.     Until we get that blood thinner, would like her to start on the dose based upon her prior labs itches a slightly reduced dose.  The medication Xarelto 15 mg a day.  She probably should start on that today or tomorrow    Also, probably should make an appointment with her PCP or someone covering for her PCP next week in order to discuss the risks and benefits of ongoing use of the blood thinners especially in light of her fall risk      Medicines sent to pharmacy  "

## 2018-06-27 NOTE — TELEPHONE ENCOUNTER
Please call daughter who probably is aware of the blood clot    As you may know, the ultrasound does show a blood clot in the left leg.  This may well be the source of some of her pain.  It is typically treated I starting a blood thinner pill and allowing it to dissolve over several weeks and months.  We can initiate the blood thinner if agreeable but we do need to except the obvious increased risk of bleeding especially if she falls.      If this is acceptable, we can initiate the blood thinner pills, Xarelto.    He would also likely be good for us to update labs as we initiate treatment.  Dr. JACKSON put in labs and she could go to the closest [facility], possibly [place]

## 2018-06-27 NOTE — TELEPHONE ENCOUNTER
at [facility] called stated that the pt is positive for a DVT, Dr. JACKSON spoke with  informed them that he will send meds for pt to start taking

## 2018-06-28 ENCOUNTER — LAB VISIT (OUTPATIENT)
Dept: LAB | Facility: HOSPITAL | Age: 83
End: 2018-06-28
Attending: INTERNAL MEDICINE
Payer: MEDICARE

## 2018-06-28 DIAGNOSIS — I82.412 ACUTE DEEP VEIN THROMBOSIS (DVT) OF FEMORAL VEIN OF LEFT LOWER EXTREMITY: ICD-10-CM

## 2018-06-28 LAB
ALBUMIN SERPL BCP-MCNC: 3 G/DL
ALP SERPL-CCNC: 87 U/L
ALT SERPL W/O P-5'-P-CCNC: 9 U/L
ANION GAP SERPL CALC-SCNC: 10 MMOL/L
APTT BLDCRRT: 24 SEC
AST SERPL-CCNC: 20 U/L
BASOPHILS # BLD AUTO: 0.06 K/UL
BASOPHILS NFR BLD: 0.5 %
BILIRUB SERPL-MCNC: 0.5 MG/DL
BUN SERPL-MCNC: 12 MG/DL
CALCIUM SERPL-MCNC: 9.7 MG/DL
CHLORIDE SERPL-SCNC: 103 MMOL/L
CO2 SERPL-SCNC: 24 MMOL/L
CREAT SERPL-MCNC: 1.1 MG/DL
DIFFERENTIAL METHOD: ABNORMAL
EOSINOPHIL # BLD AUTO: 0.2 K/UL
EOSINOPHIL NFR BLD: 1.7 %
ERYTHROCYTE [DISTWIDTH] IN BLOOD BY AUTOMATED COUNT: 14.6 %
EST. GFR  (AFRICAN AMERICAN): 54.4 ML/MIN/1.73 M^2
EST. GFR  (NON AFRICAN AMERICAN): 47.2 ML/MIN/1.73 M^2
GLUCOSE SERPL-MCNC: 97 MG/DL
HCT VFR BLD AUTO: 39.6 %
HGB BLD-MCNC: 12.2 G/DL
IMM GRANULOCYTES # BLD AUTO: 0.13 K/UL
IMM GRANULOCYTES NFR BLD AUTO: 1.1 %
INR PPP: 0.9
LYMPHOCYTES # BLD AUTO: 3.3 K/UL
LYMPHOCYTES NFR BLD: 27 %
MCH RBC QN AUTO: 30.1 PG
MCHC RBC AUTO-ENTMCNC: 30.8 G/DL
MCV RBC AUTO: 98 FL
MONOCYTES # BLD AUTO: 0.9 K/UL
MONOCYTES NFR BLD: 7.1 %
NEUTROPHILS # BLD AUTO: 7.6 K/UL
NEUTROPHILS NFR BLD: 62.6 %
NRBC BLD-RTO: 0 /100 WBC
PLATELET # BLD AUTO: 518 K/UL
PMV BLD AUTO: 11 FL
POTASSIUM SERPL-SCNC: 4.5 MMOL/L
PROT SERPL-MCNC: 7.8 G/DL
PROTHROMBIN TIME: 10.1 SEC
RBC # BLD AUTO: 4.05 M/UL
SODIUM SERPL-SCNC: 137 MMOL/L
WBC # BLD AUTO: 12.09 K/UL

## 2018-06-28 PROCEDURE — 85610 PROTHROMBIN TIME: CPT

## 2018-06-28 PROCEDURE — 36415 COLL VENOUS BLD VENIPUNCTURE: CPT | Mod: PO

## 2018-06-28 PROCEDURE — 80053 COMPREHEN METABOLIC PANEL: CPT

## 2018-06-28 PROCEDURE — 85730 THROMBOPLASTIN TIME PARTIAL: CPT

## 2018-06-28 PROCEDURE — 85025 COMPLETE CBC W/AUTO DIFF WBC: CPT

## 2018-06-28 NOTE — TELEPHONE ENCOUNTER
Informed patient's daughter (Adali) of information below. Verbalized understanding. Will reach out to PCP to see if she can see patient next week. Patient must be seen by a provider to adjust dosage of medication and/or go over risk and benefits of ongoing blood thinner medication. Also, must address fall risk.

## 2018-06-29 ENCOUNTER — TELEPHONE (OUTPATIENT)
Dept: FAMILY MEDICINE | Facility: CLINIC | Age: 83
End: 2018-06-29

## 2018-06-29 NOTE — TELEPHONE ENCOUNTER
Spoke with pt daughter states a blood clot was found in pts leg. States pt was sent home with a limited movement restriction. State she isn't sure if she misunderstood. Should pt only be limited to bed? States she read online that pts with DVTs are on bedrest and usually admitted in the hospital.     Advised pt per Dr. Lee that pt should not be restricted to movement. Pt should ambulate as she wants.  Pt should continue taking xarelto and keep F/U appt scheduled 7/2/18. Verbalized understanding.

## 2018-07-01 ENCOUNTER — PATIENT MESSAGE (OUTPATIENT)
Dept: FAMILY MEDICINE | Facility: CLINIC | Age: 83
End: 2018-07-01

## 2018-07-02 ENCOUNTER — PATIENT MESSAGE (OUTPATIENT)
Dept: FAMILY MEDICINE | Facility: CLINIC | Age: 83
End: 2018-07-02

## 2018-07-02 ENCOUNTER — OFFICE VISIT (OUTPATIENT)
Dept: FAMILY MEDICINE | Facility: CLINIC | Age: 83
End: 2018-07-02
Payer: MEDICARE

## 2018-07-02 VITALS
TEMPERATURE: 99 F | SYSTOLIC BLOOD PRESSURE: 148 MMHG | OXYGEN SATURATION: 95 % | DIASTOLIC BLOOD PRESSURE: 82 MMHG | HEART RATE: 82 BPM | BODY MASS INDEX: 23 KG/M2 | HEIGHT: 62 IN | WEIGHT: 125 LBS

## 2018-07-02 DIAGNOSIS — G31.84 MILD COGNITIVE IMPAIRMENT: ICD-10-CM

## 2018-07-02 DIAGNOSIS — I82.412 ACUTE DEEP VEIN THROMBOSIS (DVT) OF FEMORAL VEIN OF LEFT LOWER EXTREMITY: Primary | ICD-10-CM

## 2018-07-02 DIAGNOSIS — R09.81 NASAL CONGESTION: ICD-10-CM

## 2018-07-02 DIAGNOSIS — I70.0 ATHEROSCLEROSIS OF AORTA: ICD-10-CM

## 2018-07-02 DIAGNOSIS — I10 HYPERTENSION, BENIGN: ICD-10-CM

## 2018-07-02 DIAGNOSIS — R32 URINARY INCONTINENCE, UNSPECIFIED TYPE: ICD-10-CM

## 2018-07-02 PROCEDURE — 3079F DIAST BP 80-89 MM HG: CPT | Mod: CPTII,S$GLB,, | Performed by: INTERNAL MEDICINE

## 2018-07-02 PROCEDURE — 3077F SYST BP >= 140 MM HG: CPT | Mod: CPTII,S$GLB,, | Performed by: INTERNAL MEDICINE

## 2018-07-02 PROCEDURE — 99999 PR PBB SHADOW E&M-EST. PATIENT-LVL III: CPT | Mod: PBBFAC,,, | Performed by: INTERNAL MEDICINE

## 2018-07-02 PROCEDURE — 99215 OFFICE O/P EST HI 40 MIN: CPT | Mod: S$GLB,,, | Performed by: INTERNAL MEDICINE

## 2018-07-02 PROCEDURE — 99499 UNLISTED E&M SERVICE: CPT | Mod: HCNC,S$GLB,, | Performed by: INTERNAL MEDICINE

## 2018-07-02 NOTE — PROGRESS NOTES
HISTORY OF PRESENT ILLNESS:  Sharona Acosta is a 83 y.o. female who presents to the clinic today for blood clot (found blood clot on last thursday.) and Leg Pain (old pain )  .  The patient presents to clinic today for follow-up of recently diagnosed left blood clot.  I had seen her in March at which time she reports she had some pain in the left leg but she forgot to tell me at her office visit.  She came to see 1 of my partners last week who sent her for an ultrasound which diagnosed DVT.  She has had no leg swelling. She lives in an assisted living center.  She was doing regular seated exercises in a senior exercise program.  She walks with a walker.  She takes an aspirin daily for prevention of stroke and heart attack.  She is now on Xarelto.  Blood pressures are generally well controlled.  She has stable mild cognitive decline.  She has mild chronic nasal congestion. She takes allergy medication occasionally as needed.  She also suffers from urinary incontinence.  Her  gets her up once at night and makes her go to the bathroom.  During the day she does not have a regular bathroom schedule.  She denies any pain with urination.  No blood in her urine.      PAST MEDICAL HISTORY:  Past Medical History:   Diagnosis Date    Alcohol abuse     Arthritis     osteoarthritis    Atherosclerosis of aorta     noted on lumbar X-ray 4/1/2013    Back pain     H/O: compression fracture of spine     H/O: stroke 1997    h/o stroke more than 15 yrs ago with no residual    Hyperlipidemia     Hypertension     Hypothyroidism     Leukocytosis     Neutrophilic leukocytosis 4/29/2017    Normal vaginal delivery     x2    Osteopenia     Sepsis due to HCAP pneumonia 4/29/2017    Thyroid disease     hypothyroidism    Urinary incontinence     Vitamin B 12 deficiency     Vitamin D deficiency        PAST SURGICAL HISTORY:  Past Surgical History:   Procedure Laterality Date    BREAST BIOPSY      FIXATION KYPHOPLASTY   Sept. 2012    HYSTERECTOMY      JOINT REPLACEMENT      right hip replacement    TOTAL HIP ARTHROPLASTY         SOCIAL HISTORY:  Social History     Social History    Marital status:      Spouse name: N/A    Number of children: 2    Years of education: N/A     Occupational History    retired      Social History Main Topics    Smoking status: Former Smoker     Types: Cigarettes     Quit date: 5/15/1997    Smokeless tobacco: Never Used    Alcohol use No      Comment: former drinker    Drug use: No    Sexual activity: Not on file     Other Topics Concern    Not on file     Social History Narrative    No narrative on file       FAMILY HISTORY:  Family History   Problem Relation Age of Onset    Hypertension Mother     Stroke Mother     Hypertension Father     Diabetes Neg Hx     Colon cancer Neg Hx     Breast cancer Neg Hx        ALLERGIES AND MEDICATIONS: updated and reviewed.  Review of patient's allergies indicates:   Allergen Reactions    Lipitor [atorvastatin] Nausea Only     Medication List with Changes/Refills   Current Medications    ALENDRONATE (FOSAMAX) 70 MG TABLET    Take 1 tablet (70 mg total) by mouth every 7 days.    ASPIRIN 81 MG CHEW    Take 81 mg by mouth once daily.      FOLIC ACID (FOLVITE) 1 MG TABLET    Take 1 tablet (1 mg total) by mouth once daily.    LEVOTHYROXINE (SYNTHROID) 75 MCG TABLET    Take 1 tablet (75 mcg total) by mouth once daily.    MULTIVITAMIN (THERAGRAN) TABLET    Take 1 tablet by mouth once daily.    RIVAROXABAN (XARELTO) 15 MG TAB    Take 1 tablet (15 mg total) by mouth daily with dinner or evening meal.    SIMVASTATIN (ZOCOR) 40 MG TABLET    Take 1 tablet (40 mg total) by mouth every evening.    THIAMINE (VITAMIN B-1) 100 MG TABLET    Take 100 mg by mouth once daily.    VERAPAMIL (VERELAN) 240 MG C24P    Take 1 capsule (240 mg total) by mouth once daily.          CARE TEAM:  Patient Care Team:  Vianey Lee MD as PCP - General (Internal Medicine)  "        REVIEW OF SYSTEMS:  Review of Systems   Constitutional: Negative for chills, fatigue, fever and unexpected weight change.   HENT: Positive for congestion. Negative for ear discharge, ear pain and sore throat.    Eyes: Negative for photophobia, pain and visual disturbance.   Respiratory: Negative for cough, shortness of breath and wheezing.    Cardiovascular: Negative for chest pain, palpitations and leg swelling.   Gastrointestinal: Negative for abdominal pain, constipation, diarrhea, nausea and vomiting.   Genitourinary: Positive for frequency (- with incontinence). Negative for dysuria and vaginal discharge.        - has urinary incontinence;  usually wakes her up in the middle of the night to go to the bathroom   Musculoskeletal: Positive for arthralgias. Negative for back pain, joint swelling and neck stiffness.        Left leg pain since March (she forgot to tell me at that time). She was seen by one of my partners last week and diagnosed with DVT. Now on Xarelto. (no significant swelling)   Skin: Negative for rash.   Neurological: Negative for weakness and headaches.   Psychiatric/Behavioral: Negative for dysphoric mood and sleep disturbance. The patient is not nervous/anxious.          PHYSICAL EXAM:   Vitals:    07/02/18 1112   BP: (!) 148/82   Pulse: 82   Temp: 98.8 °F (37.1 °C)     Weight: 56.7 kg (125 lb) (family gave the weight.  pt can't stand for long)   Height: 5' 2" (157.5 cm)   Body mass index is 22.86 kg/m².     General appearance - alert, well appearing, and in no distress, normal appearing weight and pleasant elderly female seated in a wheelchair  Mental status - alert, oriented to person, place;  normal mood, behavior, speech, dress, motor activity, and fair insight  Eyes - pupils equal and reactive, extraocular eye movements intact, sclera anicteric  Mouth - mucous membranes moist, pharynx normal without lesions  Chest - clear to auscultation, no wheezes, rales or rhonchi, " symmetric air entry  Heart - normal rate and regular rhythm  Neurological - alert, normal speech, no focal findings or movement disorder noted, cranial nerves II through XII intact  Musculoskeletal - no joint tenderness, deformity or swelling, she had some discomfort to palpation of her left leg but no chords palpated; negative Alina's sign  Extremities - no pedal edema noted  Skin - normal coloration and turgor, no rashes, no suspicious skin lesions noted      ASSESSMENT AND PLAN:  1. Acute deep vein thrombosis (DVT) of femoral vein of left lower extremity  Continue Xarelto. We discussed the risks and benefits of anticoagulation.  She will let me know if she develops any bleeding.  She was advised that she would need evaluation if she has any falls where she hits her head.  I will have her see hematology at the end of this year to see at what point we can discontinue the Xarelto.    2. Hypertension, benign  Discussed sodium restriction, maintaining ideal body weight and regular exercise program as physiologic means to achieve blood pressure control. The patient will strive towards this. The current medical regimen is effective;  continue present plan and medications. Recommended patient to check home readings to monitor and see me for followup as scheduled or sooner as needed. Patient was educated that both decongestant and anti-inflammatory medication may raise blood pressure.     3. Atherosclerosis of aorta  Patient with Atherosclerosis of the Aorta.  Stable/asymptomatic. Currently stable on lipid lowering medication and b/p monitoring.     4. Mild cognitive impairment  Stable. Observe.     5. Nasal congestion  We discussed several treatment strategies: antihistamine at bedtime, flonase in the morning. We also discussed saline nasal rinse in the evening as needed. I recommended allergy covers for pillow and mattress. Patient will let me know if symptoms worsen or persist.     6. Urinary incontinence, unspecified  type  I discussed with her to limit fluids in the evening.  I recommended a daily routine of going to the bathroom every 2 hr.  She will minimize her caffeine intake.  We also briefly discussed Kegel exercises.  Consider Urology consultation if symptoms worsen or persist.           Follow-up in about 3 months (around 10/2/2018), or if symptoms worsen or fail to improve, for follow up chronic medical conditions.. or sooner as needed.

## 2018-07-03 ENCOUNTER — OFFICE VISIT (OUTPATIENT)
Dept: FAMILY MEDICINE | Facility: CLINIC | Age: 83
End: 2018-07-03
Payer: MEDICARE

## 2018-07-03 VITALS
OXYGEN SATURATION: 95 % | TEMPERATURE: 98 F | BODY MASS INDEX: 22.86 KG/M2 | DIASTOLIC BLOOD PRESSURE: 88 MMHG | HEART RATE: 85 BPM | HEIGHT: 62 IN | SYSTOLIC BLOOD PRESSURE: 128 MMHG | RESPIRATION RATE: 16 BRPM

## 2018-07-03 DIAGNOSIS — Z86.73 H/O: STROKE: ICD-10-CM

## 2018-07-03 DIAGNOSIS — M47.816 LUMBAR FACET ARTHROPATHY: ICD-10-CM

## 2018-07-03 DIAGNOSIS — I27.20 PULMONARY HYPERTENSION: ICD-10-CM

## 2018-07-03 DIAGNOSIS — I82.412 ACUTE DEEP VEIN THROMBOSIS (DVT) OF FEMORAL VEIN OF LEFT LOWER EXTREMITY: ICD-10-CM

## 2018-07-03 DIAGNOSIS — Z00.00 ENCOUNTER FOR PREVENTIVE HEALTH EXAMINATION: Primary | ICD-10-CM

## 2018-07-03 DIAGNOSIS — I70.0 ATHEROSCLEROSIS OF AORTA: ICD-10-CM

## 2018-07-03 PROCEDURE — 99999 PR PBB SHADOW E&M-EST. PATIENT-LVL V: CPT | Mod: PBBFAC,,, | Performed by: PHYSICIAN ASSISTANT

## 2018-07-03 PROCEDURE — 3074F SYST BP LT 130 MM HG: CPT | Mod: CPTII,S$GLB,, | Performed by: PHYSICIAN ASSISTANT

## 2018-07-03 PROCEDURE — G0439 PPPS, SUBSEQ VISIT: HCPCS | Mod: S$GLB,,, | Performed by: PHYSICIAN ASSISTANT

## 2018-07-03 PROCEDURE — 99499 UNLISTED E&M SERVICE: CPT | Mod: HCNC,S$GLB,, | Performed by: PHYSICIAN ASSISTANT

## 2018-07-03 PROCEDURE — 3079F DIAST BP 80-89 MM HG: CPT | Mod: CPTII,S$GLB,, | Performed by: PHYSICIAN ASSISTANT

## 2018-07-03 NOTE — PROGRESS NOTES
"Sharona Acosta presented for a  Medicare AWV and comprehensive Health Risk Assessment today. The following components were reviewed and updated:    · Medical history  · Family History  · Social history  · Allergies and Current Medications  · Health Risk Assessment  · Health Maintenance  · Care Team     ** See Completed Assessments for Annual Wellness Visit within the encounter summary.**       The following assessments were completed:  · Living Situation  · CAGE  · Depression Screening  · Timed Get Up and Go  · Whisper Test  · Cognitive Function Screening  · Nutrition Screening  · ADL Screening  · PAQ Screening    Vitals:    07/03/18 1010   BP: 128/88   Pulse: 85   Resp: 16   Temp: 98.3 °F (36.8 °C)   TempSrc: Oral   SpO2: 95%   Height: 5' 2" (1.575 m)     Body mass index is 22.86 kg/m².  Physical Exam      Diagnoses and health risks identified today and associated recommendations/orders:    1. Encounter for preventive health examination  Provided Sharona with a 5-10 year written screening schedule and personal prevention plan. Recommendations were developed using the USPSTF age appropriate recommendations. Education, counseling, and referrals were provided as needed. After Visit Summary printed and given to patient which includes a list of additional screenings\tests needed.    2. H/O: stroke  On xarelto, monitor    3. Acute deep vein thrombosis (DVT) of femoral vein of left lower extremity  On xarelto monitor     4. Atherosclerosis of aorta  Continue current meds, low fat diet    5. Lumbar facet arthropathy  Stable, monitor, seeing bone and joint to assess mobility     6. Pulmonary hypertension  PA pressure is 50 mg  Stable, monitor symptoms     No Follow-up on file.    Lilian Sarmiento PA-C  "

## 2018-07-03 NOTE — PATIENT INSTRUCTIONS
Counseling and Referral of Other Preventative  (Italic type indicates deductible and co-insurance are waived)    Patient Name: Sharona Acosta  Today's Date: 7/3/2018    Health Maintenance       Date Due Completion Date    TETANUS VACCINE [birth year]1955 ---    Zoster Vaccine 06/25/1997 ---    Influenza Vaccine 08/01/2018 3/5/2018    Override on 11/1/2016: Done    Override on 10/1/2013: Done    DEXA SCAN 09/01/2018 9/1/2016    Override on 2/9/2012: Done    Lipid Panel 03/29/2023 3/29/2018        No orders of the defined types were placed in this encounter.    The following information is provided to all patients.  This information is to help you find resources for any of the problems found today that may be affecting your health:                Living healthy guide: www.[facility].[place].gov      Understanding Diabetes: www.diabetes.org      Eating healthy: www.cdc.gov/healthyweight      CDC home safety checklist: www.cdc.gov/steadi/patient.html      Agency on Aging: www.goea.[place].gov      Alcoholics anonymous (AA): www.aa.org      Physical Activity: www.maria fernanda.nih.gov/nw8yolb      Tobacco use: www.quitwithusla.org

## 2018-07-08 ENCOUNTER — PATIENT MESSAGE (OUTPATIENT)
Dept: FAMILY MEDICINE | Facility: CLINIC | Age: 83
End: 2018-07-08

## 2018-09-24 DIAGNOSIS — E03.9 ACQUIRED HYPOTHYROIDISM: ICD-10-CM

## 2018-09-24 DIAGNOSIS — I10 HYPERTENSION, BENIGN: ICD-10-CM

## 2018-09-24 RX ORDER — RIVAROXABAN 15 MG/1
TABLET, FILM COATED ORAL
Qty: 30 TABLET | Refills: 0 | Status: SHIPPED | OUTPATIENT
Start: 2018-09-24 | End: 2018-10-20 | Stop reason: SDUPTHER

## 2018-09-24 RX ORDER — VERAPAMIL HYDROCHLORIDE 240 MG/1
CAPSULE, EXTENDED RELEASE ORAL
Qty: 90 CAPSULE | Refills: 0 | Status: SHIPPED | OUTPATIENT
Start: 2018-09-24 | End: 2019-08-14

## 2018-09-24 RX ORDER — LEVOTHYROXINE SODIUM 75 UG/1
TABLET ORAL
Qty: 90 TABLET | Refills: 0 | Status: SHIPPED | OUTPATIENT
Start: 2018-09-24 | End: 2018-12-21

## 2018-10-01 ENCOUNTER — TELEPHONE (OUTPATIENT)
Dept: FAMILY MEDICINE | Facility: CLINIC | Age: 83
End: 2018-10-01

## 2018-10-01 DIAGNOSIS — M85.80 OSTEOPENIA, UNSPECIFIED LOCATION: ICD-10-CM

## 2018-10-01 RX ORDER — ALENDRONATE SODIUM 70 MG/1
TABLET ORAL
Qty: 12 TABLET | Refills: 0 | Status: SHIPPED | OUTPATIENT
Start: 2018-10-01 | End: 2020-07-15

## 2018-10-01 NOTE — TELEPHONE ENCOUNTER
"----- Message from Beatriz Diego sent at 10/1/2018  3:33 PM CDT -----  Contact: Adali/ Daughter/ [phone]  Pt's calling to discuss leg ultrasound and "blood doctor" Dr. Lee ordered fpr pt to see. Please call to advise. Thank you.  "

## 2018-10-01 NOTE — TELEPHONE ENCOUNTER
"----- Message from Beatriz Diego sent at 10/1/2018  3:33 PM CDT -----  Contact: Adali/ Daughter/ [phone]  Pt's calling to discuss leg ultrasound and "blood doctor" Dr. Lee ordered fpr pt to see. Please call to advise. Thank you.  "

## 2018-10-01 NOTE — TELEPHONE ENCOUNTER
Spoke with pt daughter requesting orders for US and referral to Hematology.  Advised per Dr. Lee US is not needed at time. Referral will be place for hematology in December. Pt is due for 3mon F/U appt. States she will call for scheduling.

## 2018-10-21 RX ORDER — RIVAROXABAN 15 MG/1
TABLET, FILM COATED ORAL
Qty: 30 TABLET | Refills: 0 | Status: SHIPPED | OUTPATIENT
Start: 2018-10-21 | End: 2018-11-19 | Stop reason: SDUPTHER

## 2018-11-17 DIAGNOSIS — E78.5 HYPERLIPIDEMIA, UNSPECIFIED HYPERLIPIDEMIA TYPE: ICD-10-CM

## 2018-11-19 RX ORDER — RIVAROXABAN 15 MG/1
TABLET, FILM COATED ORAL
Qty: 30 TABLET | Refills: 0 | Status: SHIPPED | OUTPATIENT
Start: 2018-11-19 | End: 2019-03-02 | Stop reason: SDUPTHER

## 2018-11-19 RX ORDER — SIMVASTATIN 40 MG/1
TABLET, FILM COATED ORAL
Qty: 90 TABLET | Refills: 0 | Status: SHIPPED | OUTPATIENT
Start: 2018-11-19 | End: 2019-07-02 | Stop reason: SDUPTHER

## 2018-11-23 ENCOUNTER — PATIENT MESSAGE (OUTPATIENT)
Dept: FAMILY MEDICINE | Facility: CLINIC | Age: 83
End: 2018-11-23

## 2018-11-23 DIAGNOSIS — I82.412 ACUTE DEEP VEIN THROMBOSIS (DVT) OF FEMORAL VEIN OF LEFT LOWER EXTREMITY: Primary | ICD-10-CM

## 2018-11-26 ENCOUNTER — TELEPHONE (OUTPATIENT)
Dept: HEMATOLOGY/ONCOLOGY | Facility: CLINIC | Age: 83
End: 2018-11-26

## 2018-12-18 ENCOUNTER — PES CALL (OUTPATIENT)
Dept: ADMINISTRATIVE | Facility: CLINIC | Age: 83
End: 2018-12-18

## 2018-12-21 RX ORDER — LEVOTHYROXINE SODIUM 75 UG/1
TABLET ORAL
Qty: 90 TABLET | Refills: 0 | Status: SHIPPED | OUTPATIENT
Start: 2018-12-21 | End: 2019-05-29 | Stop reason: SDUPTHER

## 2018-12-26 ENCOUNTER — OFFICE VISIT (OUTPATIENT)
Dept: HEMATOLOGY/ONCOLOGY | Facility: CLINIC | Age: 83
End: 2018-12-26
Payer: MEDICARE

## 2018-12-26 VITALS
SYSTOLIC BLOOD PRESSURE: 187 MMHG | DIASTOLIC BLOOD PRESSURE: 75 MMHG | HEART RATE: 67 BPM | TEMPERATURE: 98 F | OXYGEN SATURATION: 97 %

## 2018-12-26 DIAGNOSIS — I10 ESSENTIAL HYPERTENSION: ICD-10-CM

## 2018-12-26 DIAGNOSIS — I82.412 DVT OF DEEP FEMORAL VEIN, LEFT: Primary | ICD-10-CM

## 2018-12-26 PROCEDURE — 99205 OFFICE O/P NEW HI 60 MIN: CPT | Mod: HCNC,S$GLB,, | Performed by: INTERNAL MEDICINE

## 2018-12-26 PROCEDURE — 1100F PTFALLS ASSESS-DOCD GE2>/YR: CPT | Mod: CPTII,HCNC,S$GLB, | Performed by: INTERNAL MEDICINE

## 2018-12-26 PROCEDURE — 99999 PR PBB SHADOW E&M-EST. PATIENT-LVL III: CPT | Mod: PBBFAC,HCNC,, | Performed by: INTERNAL MEDICINE

## 2018-12-26 PROCEDURE — 3288F FALL RISK ASSESSMENT DOCD: CPT | Mod: CPTII,HCNC,S$GLB, | Performed by: INTERNAL MEDICINE

## 2018-12-26 PROCEDURE — 3077F SYST BP >= 140 MM HG: CPT | Mod: CPTII,HCNC,S$GLB, | Performed by: INTERNAL MEDICINE

## 2018-12-26 PROCEDURE — 99499 UNLISTED E&M SERVICE: CPT | Mod: S$GLB,,, | Performed by: INTERNAL MEDICINE

## 2018-12-26 PROCEDURE — 3078F DIAST BP <80 MM HG: CPT | Mod: CPTII,HCNC,S$GLB, | Performed by: INTERNAL MEDICINE

## 2018-12-26 NOTE — PROGRESS NOTES
Chief Complaint :  DVT left lower extremity    Hx of Present illness :  Patient is a 83 y.o. year old female who presents to the clinic today for   Oncology evaluation.  Hx of having had a DVT in 2018. There was no prior surgery or trauma.  No long distance travel.  No Hx of Cardiac arrythmia. Does not have Defifrillator or Pacemaker.       Allergies :    Review of patient's allergies indicates:   Allergen Reactions    Lipitor [atorvastatin] Nausea Only       Occupation :   Homemaker    Transfusion :  none    Menstrual & obstetric Hx :  5 para 2.   Age of menarche:  13  Age of first pregnancy:  25  Lactation history:   Tried  Age of menopause:  Hysterectomy at age 27  HRT: none    Present Meds :      Medication List           Accurate as of 18  7:21 AM. If you have any questions, ask your nurse or doctor.               CONTINUE taking these medications    alendronate 70 MG tablet  Commonly known as:  FOSAMAX  TAKE ONE TABLET BY MOUTH EVERY 7 DAYS     aspirin 81 MG Chew     folic acid 1 MG tablet  Commonly known as:  FOLVITE  Take 1 tablet (1 mg total) by mouth once daily.     levothyroxine 75 MCG tablet  Commonly known as:  SYNTHROID  TAKE 1 TABLET BY MOUTH EVERY DAY     multivitamin tablet  Commonly known as:  THERAGRAN  Take 1 tablet by mouth once daily.     simvastatin 40 MG tablet  Commonly known as:  ZOCOR  TAKE 1 TABLET BY MOUTH EVERY DAY IN THE EVENING     verapamil 240 MG C24p  Commonly known as:  VERELAN  TAKE 1 CAPSULE BY MOUTH EVERY DAY     VITAMIN B-1 100 MG tablet  Generic drug:  thiamine     XARELTO 15 mg Tab  Generic drug:  rivaroxaban  TAKE 1 TABLET BY MOUTH DAILY FOR DINNER OR EVENING MEAL            Past Medical Hx :   Hypertension; hyperlipidemia. DVT loeft Lower extremity; Aquired hypothyroidism.   Osteopenia;m  Had a stroke about 25 years ago.  No hx of irregular heart beat; no pacemaker.  No PUD, hepatitis, Liver disease.     Past Medical Hx :  Past Medical History:    Diagnosis Date    Alcohol abuse     Arthritis     osteoarthritis    Atherosclerosis of aorta     noted on lumbar X-ray 2013    Back pain     Disorder of kidney and ureter     H/O: compression fracture of spine     H/O: stroke     h/o stroke more than 15 yrs ago with no residual    Hyperlipidemia     Hypertension     Hypothyroidism     Leukocytosis     Neutrophilic leukocytosis 2017    Normal vaginal delivery     x2    Osteopenia     Sepsis due to HCAP pneumonia 2017    Stroke     Thyroid disease     hypothyroidism    Urinary incontinence     Vitamin B 12 deficiency     Vitamin D deficiency        Travel Hx :  none    Immunization :  Immunization History   Administered Date(s) Administered    Influenza 2007, 10/22/2008, 2010, 2011    Influenza - High Dose 2015, 10/28/2016, 2018    PPD Test 2016, 2017    Pneumococcal Conjugate - 13 Valent 2016    Pneumococcal Polysaccharide - 23 Valent 07/15/2013       Family Hx :  Family History   Problem Relation Age of Onset    Hypertension Mother     Stroke Mother     Hypertension Father     Diabetes Neg Hx     Colon cancer Neg Hx     Breast cancer Neg Hx        Social Hx :  Social History     Socioeconomic History    Marital status:      Spouse name: Not on file    Number of children: 2    Years of education: Not on file    Highest education level: Not on file   Social Needs    Financial resource strain: Not on file    Food insecurity - worry: Not on file    Food insecurity - inability: Not on file    Transportation needs - medical: Not on file    Transportation needs - non-medical: Not on file   Occupational History    Occupation: retired   Tobacco Use    Smoking status: Former Smoker     Types: Cigarettes     Last attempt to quit: 5/15/1997     Years since quittin.6    Smokeless tobacco: Never Used   Substance and Sexual Activity    Alcohol use: No      Alcohol/week: 12.6 oz     Types: 21 Shots of liquor per week     Comment: former drinker    Drug use: No    Sexual activity: Not on file   Other Topics Concern    Not on file   Social History Narrative    Not on file       Surgery :  Hysterectomy; Left Hip replacement about ten years ago.  Never had colonoscopy. Cataract surgery 10 + years ago.    Symptoms :    Review of Systems   Constitutional: Negative for chills, diaphoresis, fever, malaise/fatigue and weight loss.   HENT: Negative for congestion, ear pain, hearing loss, sore throat and tinnitus.    Eyes: Negative for blurred vision, double vision, photophobia, pain, discharge and redness.        Has had cataract surgery   Respiratory: Negative.  Negative for cough, hemoptysis, sputum production, shortness of breath and wheezing.    Cardiovascular: Negative.  Negative for chest pain, palpitations, orthopnea, claudication, leg swelling and PND.   Gastrointestinal: Negative.  Negative for abdominal pain, blood in stool, constipation, diarrhea, heartburn, nausea and vomiting.   Genitourinary: Negative for dysuria, flank pain, frequency, hematuria and urgency.        Incontinence   Musculoskeletal: Negative for back pain, falls, joint pain, myalgias and neck pain.   Skin: Negative for itching and rash.   Neurological: Positive for weakness. Negative for dizziness, tingling, tremors, speech change, seizures, loss of consciousness and headaches.   Endo/Heme/Allergies: Negative for environmental allergies and polydipsia. Does not bruise/bleed easily.   Psychiatric/Behavioral: Negative for depression and memory loss. The patient is not nervous/anxious and does not have insomnia.        Physical Exam :   Daughter Adali Acosta present  Physical Exam   Constitutional: She is oriented to person, place, and time and well-developed, well-nourished, and in no distress.   HENT:   Head: Normocephalic and atraumatic.   Right Ear: External ear normal.   Left Ear: External ear  normal.   Nose: Nose normal.   Mouth/Throat: Oropharynx is clear and moist. No oropharyngeal exudate.   Eyes: Conjunctivae and EOM are normal. Right eye exhibits no discharge. Left eye exhibits no discharge. No scleral icterus.       Neck: Trachea normal, normal range of motion, full passive range of motion without pain and phonation normal. Neck supple. Normal carotid pulses, no hepatojugular reflux and no JVD present. No tracheal tenderness present. Carotid bruit is not present. No tracheal deviation present. No thyroid mass and no thyromegaly present.   Cardiovascular: Normal rate, regular rhythm, normal heart sounds and intact distal pulses. Exam reveals no gallop and no friction rub.   No murmur heard.  Pulmonary/Chest: Effort normal and breath sounds normal. No stridor. No respiratory distress. She has no wheezes. She has no rales. She exhibits no tenderness.   Abdominal: Soft. Normal appearance, normal aorta and bowel sounds are normal. She exhibits no distension, no ascites and no mass. There is no hepatosplenomegaly. There is no tenderness. There is no rebound, no guarding and no CVA tenderness. No hernia.   Musculoskeletal: Normal range of motion. She exhibits no edema, tenderness or deformity.   Lymphadenopathy:        Head (right side): No submental, no submandibular, no tonsillar, no preauricular, no posterior auricular and no occipital adenopathy present.        Head (left side): No submental, no submandibular, no tonsillar, no preauricular and no posterior auricular adenopathy present.     She has no cervical adenopathy.     She has no axillary adenopathy.        Right: No inguinal, no supraclavicular and no epitrochlear adenopathy present.        Left: No inguinal, no supraclavicular and no epitrochlear adenopathy present.   Neurological: She is alert and oriented to person, place, and time. She has normal sensation, normal strength, normal reflexes and intact cranial nerves. She displays normal  reflexes. No cranial nerve deficit. She exhibits normal muscle tone. Coordination normal. GCS score is 15.   Wheel Chair bound   Skin: Skin is warm, dry and intact. No rash noted. No cyanosis or erythema. No pallor. Nails show no clubbing.   Psychiatric: Mood, memory, affect and judgment normal.         Labs & Imaging :  U/s lower Extremity revealed partially [id number] thrombus Left femoral vein.        Dx :  Hx of DVT left lower extremity      Assessment & Plan:  Reviewed in detail with patient & her daughter. Unprovoked DVT. Pros & Cons of Continuing Xeralto reviewed will get U/S Left Lower extremity and coagulation studies. Re evaluate with results. Blood pressure followup with . They understand and verbalised.

## 2018-12-26 NOTE — LETTER
December 26, 2018      Vianey Lee MD  [address]  [place] [address]           [facility]  [address] [facility] [address]  [place] [address]  Phone: [phone]          Patient: Sharona Acosta   MR Number: [id number]   YOB: 1935   Date of Visit: 12/26/2018       Dear Dr. Vianey Lee:    Thank you for referring Sharona Acosta to me for evaluation. Attached you will find relevant portions of my assessment and plan of care.    If you have questions, please do not hesitate to call me. I look forward to following Sharona Acosta along with you.    Sincerely,    Chris Fuentes MD    Enclosure  CC:  No Recipients    If you would like to receive this communication electronically, please contact [email or web address] or [phone] to request more information on [facility] [employer] access.    For providers and/or their staff who would like to refer a patient to [facility], please contact us through our one-stop-shop provider referral line, [facility], at [phone].    If you feel you have received this communication in error or would no longer like to receive these types of communications, please e-mail [email or web address]

## 2018-12-31 ENCOUNTER — HOSPITAL ENCOUNTER (OUTPATIENT)
Dept: RADIOLOGY | Facility: HOSPITAL | Age: 83
Discharge: HOME | End: 2018-12-31
Attending: INTERNAL MEDICINE
Payer: MEDICARE

## 2018-12-31 DIAGNOSIS — I82.412 DVT OF DEEP FEMORAL VEIN, LEFT: ICD-10-CM

## 2018-12-31 PROCEDURE — 93971 US LOWER EXTREMITY VEINS LEFT: ICD-10-PCS | Mod: 26,HCNC,, | Performed by: RADIOLOGY

## 2018-12-31 PROCEDURE — 93971 EXTREMITY STUDY: CPT | Mod: TC,HCNC

## 2018-12-31 PROCEDURE — 93971 EXTREMITY STUDY: CPT | Mod: 26,HCNC,, | Performed by: RADIOLOGY

## 2019-01-21 ENCOUNTER — OFFICE VISIT (OUTPATIENT)
Dept: HEMATOLOGY/ONCOLOGY | Facility: CLINIC | Age: 84
End: 2019-01-21
Payer: MEDICARE

## 2019-01-21 VITALS
WEIGHT: 125 LBS | SYSTOLIC BLOOD PRESSURE: 138 MMHG | OXYGEN SATURATION: 96 % | TEMPERATURE: 98 F | HEIGHT: 63 IN | BODY MASS INDEX: 22.15 KG/M2 | HEART RATE: 78 BPM | DIASTOLIC BLOOD PRESSURE: 62 MMHG

## 2019-01-21 DIAGNOSIS — I82.412 DVT OF DEEP FEMORAL VEIN, LEFT: Primary | ICD-10-CM

## 2019-01-21 DIAGNOSIS — I10 ESSENTIAL HYPERTENSION: ICD-10-CM

## 2019-01-21 PROCEDURE — 99999 PR PBB SHADOW E&M-EST. PATIENT-LVL III: CPT | Mod: PBBFAC,HCNC,, | Performed by: INTERNAL MEDICINE

## 2019-01-21 PROCEDURE — 3078F PR MOST RECENT DIASTOLIC BLOOD PRESSURE < 80 MM HG: ICD-10-PCS | Mod: CPTII,HCNC,S$GLB, | Performed by: INTERNAL MEDICINE

## 2019-01-21 PROCEDURE — 3075F SYST BP GE 130 - 139MM HG: CPT | Mod: CPTII,HCNC,S$GLB, | Performed by: INTERNAL MEDICINE

## 2019-01-21 PROCEDURE — 1101F PT FALLS ASSESS-DOCD LE1/YR: CPT | Mod: CPTII,HCNC,S$GLB, | Performed by: INTERNAL MEDICINE

## 2019-01-21 PROCEDURE — 99499 RISK ADDL DX/OHS AUDIT: ICD-10-PCS | Mod: S$GLB,,, | Performed by: INTERNAL MEDICINE

## 2019-01-21 PROCEDURE — 3078F DIAST BP <80 MM HG: CPT | Mod: CPTII,HCNC,S$GLB, | Performed by: INTERNAL MEDICINE

## 2019-01-21 PROCEDURE — 99213 PR OFFICE/OUTPT VISIT, EST, LEVL III, 20-29 MIN: ICD-10-PCS | Mod: HCNC,S$GLB,, | Performed by: INTERNAL MEDICINE

## 2019-01-21 PROCEDURE — 1101F PR PT FALLS ASSESS DOC 0-1 FALLS W/OUT INJ PAST YR: ICD-10-PCS | Mod: CPTII,HCNC,S$GLB, | Performed by: INTERNAL MEDICINE

## 2019-01-21 PROCEDURE — 3075F PR MOST RECENT SYSTOLIC BLOOD PRESS GE 130-139MM HG: ICD-10-PCS | Mod: CPTII,HCNC,S$GLB, | Performed by: INTERNAL MEDICINE

## 2019-01-21 PROCEDURE — 99999 PR PBB SHADOW E&M-EST. PATIENT-LVL III: ICD-10-PCS | Mod: PBBFAC,HCNC,, | Performed by: INTERNAL MEDICINE

## 2019-01-21 PROCEDURE — 99499 UNLISTED E&M SERVICE: CPT | Mod: S$GLB,,, | Performed by: INTERNAL MEDICINE

## 2019-01-21 PROCEDURE — 99213 OFFICE O/P EST LOW 20 MIN: CPT | Mod: HCNC,S$GLB,, | Performed by: INTERNAL MEDICINE

## 2019-01-21 NOTE — PROGRESS NOTES
Chief Complaint :  DVT left lower extremity    Hx of Present illness :  Patient is a 83 y.o. year old female who presents to the clinic today for   Oncology evaluation.  Hx of having had a DVT in 2018. There was no prior surgery or trauma.  No long distance travel.  No Hx of Cardiac arrythmia. Does not have Defifrillator or Pacemaker.       Allergies :    Review of patient's allergies indicates:   Allergen Reactions    Lipitor [atorvastatin] Nausea Only       Occupation :   Homemaker    Transfusion :  none    Menstrual & obstetric Hx :  5 para 2.   Age of menarche:  13  Age of first pregnancy:  25  Lactation history:   Tried  Age of menopause:  Hysterectomy at age 27  HRT: none    Present Meds :      Medication List           Accurate as of 19  2:04 PM. If you have any questions, ask your nurse or doctor.               CONTINUE taking these medications    alendronate 70 MG tablet  Commonly known as:  FOSAMAX  TAKE ONE TABLET BY MOUTH EVERY 7 DAYS     aspirin 81 MG Chew     folic acid 1 MG tablet  Commonly known as:  FOLVITE  Take 1 tablet (1 mg total) by mouth once daily.     levothyroxine 75 MCG tablet  Commonly known as:  SYNTHROID  TAKE 1 TABLET BY MOUTH EVERY DAY     multivitamin tablet  Commonly known as:  THERAGRAN  Take 1 tablet by mouth once daily.     simvastatin 40 MG tablet  Commonly known as:  ZOCOR  TAKE 1 TABLET BY MOUTH EVERY DAY IN THE EVENING     verapamil 240 MG C24p  Commonly known as:  VERELAN  TAKE 1 CAPSULE BY MOUTH EVERY DAY     VITAMIN B-1 100 MG tablet  Generic drug:  thiamine     XARELTO 15 mg Tab  Generic drug:  rivaroxaban  TAKE 1 TABLET BY MOUTH DAILY FOR DINNER OR EVENING MEAL            Past Medical Hx :   Hypertension; hyperlipidemia. DVT loeft Lower extremity; Aquired hypothyroidism.   Osteopenia;m  Had a stroke about 25 years ago.  No hx of irregular heart beat; no pacemaker.  No PUD, hepatitis, Liver disease.     Past Medical Hx :  Past Medical History:    Diagnosis Date    Alcohol abuse     Arthritis     osteoarthritis    Atherosclerosis of aorta     noted on lumbar X-ray 2013    Back pain     Disorder of kidney and ureter     H/O: compression fracture of spine     H/O: stroke     h/o stroke more than 15 yrs ago with no residual    Hyperlipidemia     Hypertension     Hypothyroidism     Leukocytosis     Neutrophilic leukocytosis 2017    Normal vaginal delivery     x2    Osteopenia     Sepsis due to HCAP pneumonia 2017    Stroke     Thyroid disease     hypothyroidism    Urinary incontinence     Vitamin B 12 deficiency     Vitamin D deficiency        Travel Hx :  none    Immunization :  Immunization History   Administered Date(s) Administered    Influenza 2007, 10/22/2008, 2010, 2011    Influenza - High Dose 2015, 10/28/2016, 2018    PPD Test 2016, 2017    Pneumococcal Conjugate - 13 Valent 2016    Pneumococcal Polysaccharide - 23 Valent 07/15/2013       Family Hx :  Family History   Problem Relation Age of Onset    Hypertension Mother     Stroke Mother     Hypertension Father     Diabetes Neg Hx     Colon cancer Neg Hx     Breast cancer Neg Hx        Social Hx :  Social History     Socioeconomic History    Marital status:      Spouse name: Not on file    Number of children: 2    Years of education: Not on file    Highest education level: Not on file   Social Needs    Financial resource strain: Not on file    Food insecurity - worry: Not on file    Food insecurity - inability: Not on file    Transportation needs - medical: Not on file    Transportation needs - non-medical: Not on file   Occupational History    Occupation: retired   Tobacco Use    Smoking status: Former Smoker     Types: Cigarettes     Last attempt to quit: 5/15/1997     Years since quittin.7    Smokeless tobacco: Never Used   Substance and Sexual Activity    Alcohol use: No      Alcohol/week: 12.6 oz     Types: 21 Shots of liquor per week     Comment: former drinker    Drug use: No    Sexual activity: Not on file   Other Topics Concern    Not on file   Social History Narrative    Not on file       Surgery :  Hysterectomy; Left Hip replacement about ten years ago.  Never had colonoscopy. Cataract surgery 10 + years ago.    Symptoms :    Review of Systems   Constitutional: Negative for chills, diaphoresis, fever, malaise/fatigue and weight loss.   HENT: Negative for congestion, ear pain, hearing loss, sore throat and tinnitus.    Eyes: Negative for blurred vision, double vision, photophobia, pain, discharge and redness.        Has had cataract surgery   Respiratory: Negative.  Negative for cough, hemoptysis, sputum production, shortness of breath and wheezing.    Cardiovascular: Negative.  Negative for chest pain, palpitations, orthopnea, claudication, leg swelling and PND.   Gastrointestinal: Negative.  Negative for abdominal pain, blood in stool, constipation, diarrhea, heartburn, nausea and vomiting.   Genitourinary: Negative for dysuria, flank pain, frequency, hematuria and urgency.        Incontinence   Musculoskeletal: Negative for back pain, falls, joint pain, myalgias and neck pain.   Skin: Negative for itching and rash.   Neurological: Positive for weakness. Negative for dizziness, tingling, tremors, speech change, seizures, loss of consciousness and headaches.   Endo/Heme/Allergies: Negative for environmental allergies and polydipsia. Does not bruise/bleed easily.   Psychiatric/Behavioral: Negative for depression and memory loss. The patient is not nervous/anxious and does not have insomnia.        Physical Exam :   Daughter Adali Acosta present  Physical Exam   Constitutional: She is oriented to person, place, and time and well-developed, well-nourished, and in no distress.   HENT:   Head: Normocephalic and atraumatic.   Right Ear: External ear normal.   Left Ear: External ear  normal.   Nose: Nose normal.   Mouth/Throat: Oropharynx is clear and moist. No oropharyngeal exudate.   Eyes: Conjunctivae and EOM are normal. Right eye exhibits no discharge. Left eye exhibits no discharge. No scleral icterus.       Neck: Trachea normal, normal range of motion, full passive range of motion without pain and phonation normal. Neck supple. Normal carotid pulses, no hepatojugular reflux and no JVD present. No tracheal tenderness present. Carotid bruit is not present. No tracheal deviation present. No thyroid mass and no thyromegaly present.   Cardiovascular: Normal rate, regular rhythm, normal heart sounds and intact distal pulses. Exam reveals no gallop and no friction rub.   No murmur heard.  Pulmonary/Chest: Effort normal and breath sounds normal. No stridor. No respiratory distress. She has no wheezes. She has no rales. She exhibits no tenderness.   Abdominal: Soft. Normal appearance, normal aorta and bowel sounds are normal. She exhibits no distension, no ascites and no mass. There is no hepatosplenomegaly. There is no tenderness. There is no rebound, no guarding and no CVA tenderness. No hernia.   Musculoskeletal: Normal range of motion. She exhibits no edema, tenderness or deformity.   Lymphadenopathy:        Head (right side): No submental, no submandibular, no tonsillar, no preauricular, no posterior auricular and no occipital adenopathy present.        Head (left side): No submental, no submandibular, no tonsillar, no preauricular and no posterior auricular adenopathy present.     She has no cervical adenopathy.     She has no axillary adenopathy.        Right: No inguinal, no supraclavicular and no epitrochlear adenopathy present.        Left: No inguinal, no supraclavicular and no epitrochlear adenopathy present.   Neurological: She is alert and oriented to person, place, and time. She has normal sensation, normal strength, normal reflexes and intact cranial nerves. No cranial nerve  deficit. She exhibits normal muscle tone. Coordination normal. GCS score is 15.   Wheel Chair bound   Skin: Skin is warm, dry and intact. No rash noted. No cyanosis or erythema. No pallor. Nails show no clubbing.   Psychiatric: Mood, memory, affect and judgment normal.         Labs & Imaging :  U/s lower Extremity revealed partially [id number] thrombus Left femoral vein.   12/31/18 : Factor 5 Leiden : normal genotype. Lupus anticoagulant neg. Cardiolipin antibody normal. Protein c & s activity normal aT3 normal. Serum homocysteine & fibrinogen normal.  hgb 9.7; hct 31.3; plts 325,000 MCV 82. ANC 5,900; Venous doppler : Presecnce of DVT involving left femoral vein ; unchanged from 6/27/18.      Dx :  Hx of DVT left lower extremity      Assessment & Plan:  Reviewed in detail with patient & her daughter. Unprovoked DVT. Pros & Cons of Continuing Xeralto reviewed. Stay on xeralto for 3 months and recheck U/S left lower extremity. MVI with iron once daily.  Blood pressure followup with . They understand and verbalised.

## 2019-02-14 ENCOUNTER — PES CALL (OUTPATIENT)
Dept: ADMINISTRATIVE | Facility: CLINIC | Age: 84
End: 2019-02-14

## 2019-03-03 RX ORDER — RIVAROXABAN 15 MG/1
TABLET, FILM COATED ORAL
Qty: 30 TABLET | Refills: 12 | Status: SHIPPED | OUTPATIENT
Start: 2019-03-03 | End: 2019-03-06 | Stop reason: SDUPTHER

## 2019-03-05 ENCOUNTER — PATIENT MESSAGE (OUTPATIENT)
Dept: HEMATOLOGY/ONCOLOGY | Facility: CLINIC | Age: 84
End: 2019-03-05

## 2019-03-05 DIAGNOSIS — I82.409 DVT (DEEP VENOUS THROMBOSIS): Primary | ICD-10-CM

## 2019-03-07 ENCOUNTER — OFFICE VISIT (OUTPATIENT)
Dept: OPTOMETRY | Facility: CLINIC | Age: 84
End: 2019-03-07
Payer: MEDICARE

## 2019-03-07 ENCOUNTER — TELEPHONE (OUTPATIENT)
Dept: OPTOMETRY | Facility: CLINIC | Age: 84
End: 2019-03-07

## 2019-03-07 DIAGNOSIS — S05.01XA ABRASION OF RIGHT CORNEA, INITIAL ENCOUNTER: Primary | ICD-10-CM

## 2019-03-07 PROCEDURE — 99999 PR PBB SHADOW E&M-EST. PATIENT-LVL II: CPT | Mod: PBBFAC,HCNC,, | Performed by: OPTOMETRIST

## 2019-03-07 PROCEDURE — 92002 INTRM OPH EXAM NEW PATIENT: CPT | Mod: HCNC,S$GLB,, | Performed by: OPTOMETRIST

## 2019-03-07 PROCEDURE — 99999 PR PBB SHADOW E&M-EST. PATIENT-LVL II: ICD-10-PCS | Mod: PBBFAC,HCNC,, | Performed by: OPTOMETRIST

## 2019-03-07 PROCEDURE — 92002 PR EYE EXAM, NEW PATIENT,INTERMED: ICD-10-PCS | Mod: HCNC,S$GLB,, | Performed by: OPTOMETRIST

## 2019-03-07 NOTE — PROGRESS NOTES
Subjective:       Patient ID: Sharona Acosta is a 83 y.o. female      Chief Complaint   Patient presents with    Eye Problem     History of Present Illness  Dls: ? Yrs     82 y/o female presents today with c/o x 1 day ago stuck finger in od red od itching od mucous od pain 1-2 od blurry vision ou photophobia od. Pt used visine only once x 1 day.      Assessment/Plan:     1. Abrasion of right cornea, initial encounter  Tobrex QID OD x 1 week. PFAT PRN. RTC PRN.  - tobramycin sulfate 0.3% (TOBREX) 0.3 % ophthalmic ointment; Place 0.5 inches into the right eye 4 (four) times daily. Place a 1/2 inch ribbon of ointment into the lower eyelid. for 10 days  Dispense: 3.5 g; Refill: 0    Follow-up if symptoms worsen or fail to improve.

## 2019-03-07 NOTE — PATIENT INSTRUCTIONS
Tobramycin ointment four times a day in the right eye for 1 week    Preservative free artificial tears as needed.

## 2019-03-07 NOTE — TELEPHONE ENCOUNTER
I spoke to frnaco and per Dr. Duffy change ambrocio to Erythromycin ambrocio due to tobradex too expensive. Spoke to Pt's daughter to let her know.

## 2019-03-07 NOTE — TELEPHONE ENCOUNTER
----- Message from Abigail Villa sent at 3/7/2019  4:17 PM CST -----  Contact: Sharona Blue calling to inform that the medication called in is too expensive.  They would like to have something else called in to the [facility] .

## 2019-03-16 DIAGNOSIS — M85.80 OSTEOPENIA, UNSPECIFIED LOCATION: ICD-10-CM

## 2019-03-18 RX ORDER — ALENDRONATE SODIUM 70 MG/1
TABLET ORAL
Qty: 12 TABLET | Refills: 0 | Status: SHIPPED | OUTPATIENT
Start: 2019-03-18 | End: 2019-08-14 | Stop reason: SDUPTHER

## 2019-03-21 ENCOUNTER — TELEPHONE (OUTPATIENT)
Dept: OTOLARYNGOLOGY | Facility: CLINIC | Age: 84
End: 2019-03-21

## 2019-03-21 DIAGNOSIS — I10 HYPERTENSION, BENIGN: ICD-10-CM

## 2019-03-21 RX ORDER — VERAPAMIL HYDROCHLORIDE 240 MG/1
CAPSULE, EXTENDED RELEASE ORAL
Qty: 90 CAPSULE | Refills: 0 | Status: SHIPPED | OUTPATIENT
Start: 2019-03-21 | End: 2019-08-14 | Stop reason: SDUPTHER

## 2019-03-21 NOTE — TELEPHONE ENCOUNTER
----- Message from Adali España sent at 3/21/2019 12:34 PM CDT -----  Contact: daughter- Adali - [phone]  Pt's daughter returned staff's call. Please call back after 1:45.

## 2019-04-21 ENCOUNTER — PATIENT MESSAGE (OUTPATIENT)
Dept: HEMATOLOGY/ONCOLOGY | Facility: CLINIC | Age: 84
End: 2019-04-21

## 2019-05-05 ENCOUNTER — PATIENT MESSAGE (OUTPATIENT)
Dept: FAMILY MEDICINE | Facility: CLINIC | Age: 84
End: 2019-05-05

## 2019-05-29 RX ORDER — LEVOTHYROXINE SODIUM 75 UG/1
75 TABLET ORAL DAILY
Qty: 90 TABLET | Refills: 0 | Status: SHIPPED | OUTPATIENT
Start: 2019-05-29 | End: 2019-08-23 | Stop reason: SDUPTHER

## 2019-06-27 ENCOUNTER — PES CALL (OUTPATIENT)
Dept: ADMINISTRATIVE | Facility: CLINIC | Age: 84
End: 2019-06-27

## 2019-07-01 ENCOUNTER — PATIENT MESSAGE (OUTPATIENT)
Dept: FAMILY MEDICINE | Facility: CLINIC | Age: 84
End: 2019-07-01

## 2019-07-01 NOTE — TELEPHONE ENCOUNTER
I am okay for her to continue physical therapy.  Can we please see will we need to do to get this done?

## 2019-07-02 ENCOUNTER — PATIENT MESSAGE (OUTPATIENT)
Dept: FAMILY MEDICINE | Facility: CLINIC | Age: 84
End: 2019-07-02

## 2019-07-02 DIAGNOSIS — E78.5 HYPERLIPIDEMIA, UNSPECIFIED HYPERLIPIDEMIA TYPE: ICD-10-CM

## 2019-07-02 RX ORDER — SIMVASTATIN 40 MG/1
TABLET, FILM COATED ORAL
Qty: 90 TABLET | Refills: 0 | Status: SHIPPED | OUTPATIENT
Start: 2019-07-02 | End: 2019-08-23 | Stop reason: SDUPTHER

## 2019-07-02 NOTE — TELEPHONE ENCOUNTER
Pt. Notified of medication refill, and needs an appt. Pt. Stated she is driving now and will call back to schedule

## 2019-07-31 ENCOUNTER — TELEPHONE (OUTPATIENT)
Dept: FAMILY MEDICINE | Facility: CLINIC | Age: 84
End: 2019-07-31

## 2019-07-31 ENCOUNTER — PATIENT MESSAGE (OUTPATIENT)
Dept: FAMILY MEDICINE | Facility: CLINIC | Age: 84
End: 2019-07-31

## 2019-07-31 DIAGNOSIS — R54 AGE-RELATED PHYSICAL DEBILITY: Primary | ICD-10-CM

## 2019-07-31 NOTE — TELEPHONE ENCOUNTER
Consult order placed.  Patient will need to see me within the next 30 days as she has not seen a since July of 2018 in order to have a face-to-face encounter for the home health physical therapy.

## 2019-07-31 NOTE — TELEPHONE ENCOUNTER
Spoke with patient's daughter Adali and she informed me that patient was discharger from [facility] with Physical Therapy on 6/5/19 with goal mets. She said that patient has sense than declined with her mobility. I placed a call to [facility] and spoke with Dee and she said they will need new order for Physical Therapy.

## 2019-08-02 ENCOUNTER — TELEPHONE (OUTPATIENT)
Dept: FAMILY MEDICINE | Facility: CLINIC | Age: 84
End: 2019-08-02

## 2019-08-02 PROCEDURE — G0180 MD CERTIFICATION HHA PATIENT: HCPCS | Mod: ,,, | Performed by: INTERNAL MEDICINE

## 2019-08-02 PROCEDURE — G0180 PR HOME HEALTH MD CERTIFICATION: ICD-10-PCS | Mod: ,,, | Performed by: INTERNAL MEDICINE

## 2019-08-02 NOTE — TELEPHONE ENCOUNTER
----- Message from Kyleigh Flynn sent at 8/2/2019 11:21 AM CDT -----  Contact: Self   Type: Patient Call Back    What is the request in detail: Pt returning call.     Can the clinic reply by [facility]? No    Would the patient rather a call back or a response via [facility]? Call back    Best call back number: [phone]

## 2019-08-12 ENCOUNTER — PATIENT MESSAGE (OUTPATIENT)
Dept: FAMILY MEDICINE | Facility: CLINIC | Age: 84
End: 2019-08-12

## 2019-08-14 ENCOUNTER — LAB VISIT (OUTPATIENT)
Dept: LAB | Facility: HOSPITAL | Age: 84
End: 2019-08-14
Attending: INTERNAL MEDICINE
Payer: MEDICARE

## 2019-08-14 ENCOUNTER — OFFICE VISIT (OUTPATIENT)
Dept: FAMILY MEDICINE | Facility: CLINIC | Age: 84
End: 2019-08-14
Payer: MEDICARE

## 2019-08-14 VITALS
HEART RATE: 68 BPM | OXYGEN SATURATION: 97 % | TEMPERATURE: 98 F | DIASTOLIC BLOOD PRESSURE: 70 MMHG | HEIGHT: 63 IN | SYSTOLIC BLOOD PRESSURE: 130 MMHG | BODY MASS INDEX: 22.14 KG/M2

## 2019-08-14 DIAGNOSIS — Z23 NEED FOR SHINGLES VACCINE: ICD-10-CM

## 2019-08-14 DIAGNOSIS — E55.9 VITAMIN D DEFICIENCY: ICD-10-CM

## 2019-08-14 DIAGNOSIS — M85.80 OSTEOPENIA, SENILE: ICD-10-CM

## 2019-08-14 DIAGNOSIS — Z23 NEED FOR TDAP VACCINATION: ICD-10-CM

## 2019-08-14 DIAGNOSIS — I50.9 MILD CONGESTIVE HEART FAILURE: ICD-10-CM

## 2019-08-14 DIAGNOSIS — N28.9 RENAL INSUFFICIENCY: ICD-10-CM

## 2019-08-14 DIAGNOSIS — I10 HYPERTENSION, BENIGN: ICD-10-CM

## 2019-08-14 DIAGNOSIS — Z86.718 HISTORY OF DEEP VEIN THROMBOSIS (DVT) OF LOWER EXTREMITY: ICD-10-CM

## 2019-08-14 DIAGNOSIS — M89.9 DISORDER OF BONE AND CARTILAGE: ICD-10-CM

## 2019-08-14 DIAGNOSIS — Z99.3 WHEELCHAIR DEPENDENCE: ICD-10-CM

## 2019-08-14 DIAGNOSIS — I27.20 PULMONARY HYPERTENSION: ICD-10-CM

## 2019-08-14 DIAGNOSIS — I70.0 ATHEROSCLEROSIS OF AORTA: ICD-10-CM

## 2019-08-14 DIAGNOSIS — G31.84 MILD COGNITIVE IMPAIRMENT: ICD-10-CM

## 2019-08-14 DIAGNOSIS — E78.5 HYPERLIPIDEMIA, UNSPECIFIED HYPERLIPIDEMIA TYPE: ICD-10-CM

## 2019-08-14 DIAGNOSIS — J84.9 INTERSTITIAL PULMONARY DISEASE: ICD-10-CM

## 2019-08-14 DIAGNOSIS — Z23 FLU VACCINE NEED: ICD-10-CM

## 2019-08-14 DIAGNOSIS — Z00.00 ROUTINE MEDICAL EXAM: Primary | ICD-10-CM

## 2019-08-14 DIAGNOSIS — B35.1 ONYCHOMYCOSIS: ICD-10-CM

## 2019-08-14 DIAGNOSIS — M94.9 DISORDER OF BONE AND CARTILAGE: ICD-10-CM

## 2019-08-14 DIAGNOSIS — E03.9 HYPOTHYROIDISM (ACQUIRED): ICD-10-CM

## 2019-08-14 DIAGNOSIS — I35.0 NONRHEUMATIC AORTIC VALVE STENOSIS: ICD-10-CM

## 2019-08-14 DIAGNOSIS — Z86.73 H/O: STROKE: ICD-10-CM

## 2019-08-14 PROBLEM — I82.412 DVT OF DEEP FEMORAL VEIN, LEFT: Status: RESOLVED | Noted: 2018-12-26 | Resolved: 2019-08-14

## 2019-08-14 LAB
ALBUMIN SERPL BCP-MCNC: 3.7 G/DL (ref 3.5–5.2)
ALP SERPL-CCNC: 81 U/L (ref 55–135)
ALT SERPL W/O P-5'-P-CCNC: 10 U/L (ref 10–44)
ANION GAP SERPL CALC-SCNC: 8 MMOL/L (ref 8–16)
AST SERPL-CCNC: 20 U/L (ref 10–40)
BASOPHILS # BLD AUTO: 0.05 K/UL (ref 0–0.2)
BASOPHILS NFR BLD: 0.4 % (ref 0–1.9)
BILIRUB SERPL-MCNC: 0.5 MG/DL (ref 0.1–1)
BUN SERPL-MCNC: 15 MG/DL (ref 8–23)
CALCIUM SERPL-MCNC: 10.1 MG/DL (ref 8.7–10.5)
CHLORIDE SERPL-SCNC: 104 MMOL/L (ref 95–110)
CHOLEST SERPL-MCNC: 148 MG/DL (ref 120–199)
CHOLEST/HDLC SERPL: 2.3 {RATIO} (ref 2–5)
CO2 SERPL-SCNC: 28 MMOL/L (ref 23–29)
CREAT SERPL-MCNC: 0.9 MG/DL (ref 0.5–1.4)
DIFFERENTIAL METHOD: ABNORMAL
EOSINOPHIL # BLD AUTO: 0.2 K/UL (ref 0–0.5)
EOSINOPHIL NFR BLD: 1.3 % (ref 0–8)
ERYTHROCYTE [DISTWIDTH] IN BLOOD BY AUTOMATED COUNT: 16.8 % (ref 11.5–14.5)
EST. GFR  (AFRICAN AMERICAN): >60 ML/MIN/1.73 M^2
EST. GFR  (NON AFRICAN AMERICAN): 59.7 ML/MIN/1.73 M^2
GLUCOSE SERPL-MCNC: 79 MG/DL (ref 70–110)
HCT VFR BLD AUTO: 43.7 % (ref 37–48.5)
HDLC SERPL-MCNC: 64 MG/DL (ref 40–75)
HDLC SERPL: 43.2 % (ref 20–50)
HGB BLD-MCNC: 13.4 G/DL (ref 12–16)
IMM GRANULOCYTES # BLD AUTO: 0.03 K/UL (ref 0–0.04)
IMM GRANULOCYTES NFR BLD AUTO: 0.3 % (ref 0–0.5)
LDLC SERPL CALC-MCNC: 72.6 MG/DL (ref 63–159)
LYMPHOCYTES # BLD AUTO: 3.7 K/UL (ref 1–4.8)
LYMPHOCYTES NFR BLD: 32.7 % (ref 18–48)
MCH RBC QN AUTO: 29.3 PG (ref 27–31)
MCHC RBC AUTO-ENTMCNC: 30.7 G/DL (ref 32–36)
MCV RBC AUTO: 95 FL (ref 82–98)
MONOCYTES # BLD AUTO: 0.8 K/UL (ref 0.3–1)
MONOCYTES NFR BLD: 7.4 % (ref 4–15)
NEUTROPHILS # BLD AUTO: 6.6 K/UL (ref 1.8–7.7)
NEUTROPHILS NFR BLD: 57.9 % (ref 38–73)
NONHDLC SERPL-MCNC: 84 MG/DL
NRBC BLD-RTO: 0 /100 WBC
PLATELET # BLD AUTO: 230 K/UL (ref 150–350)
PMV BLD AUTO: 12.3 FL (ref 9.2–12.9)
POTASSIUM SERPL-SCNC: 4.5 MMOL/L (ref 3.5–5.1)
PROT SERPL-MCNC: 7.9 G/DL (ref 6–8.4)
PTH-INTACT SERPL-MCNC: 65 PG/ML (ref 9–77)
RBC # BLD AUTO: 4.58 M/UL (ref 4–5.4)
SODIUM SERPL-SCNC: 140 MMOL/L (ref 136–145)
T4 FREE SERPL-MCNC: 1.11 NG/DL (ref 0.71–1.51)
TRIGL SERPL-MCNC: 57 MG/DL (ref 30–150)
TSH SERPL DL<=0.005 MIU/L-ACNC: 4.57 UIU/ML (ref 0.4–4)
WBC # BLD AUTO: 11.42 K/UL (ref 3.9–12.7)

## 2019-08-14 PROCEDURE — 3078F PR MOST RECENT DIASTOLIC BLOOD PRESSURE < 80 MM HG: ICD-10-PCS | Mod: HCNC,CPTII,S$GLB, | Performed by: INTERNAL MEDICINE

## 2019-08-14 PROCEDURE — 99499 UNLISTED E&M SERVICE: CPT | Mod: S$GLB,,, | Performed by: INTERNAL MEDICINE

## 2019-08-14 PROCEDURE — 83970 ASSAY OF PARATHORMONE: CPT | Mod: HCNC

## 2019-08-14 PROCEDURE — 99397 PR PREVENTIVE VISIT,EST,65 & OVER: ICD-10-PCS | Mod: HCNC,S$GLB,, | Performed by: INTERNAL MEDICINE

## 2019-08-14 PROCEDURE — 85025 COMPLETE CBC W/AUTO DIFF WBC: CPT | Mod: HCNC

## 2019-08-14 PROCEDURE — 99999 PR PBB SHADOW E&M-EST. PATIENT-LVL III: ICD-10-PCS | Mod: PBBFAC,HCNC,, | Performed by: INTERNAL MEDICINE

## 2019-08-14 PROCEDURE — 3078F DIAST BP <80 MM HG: CPT | Mod: HCNC,CPTII,S$GLB, | Performed by: INTERNAL MEDICINE

## 2019-08-14 PROCEDURE — 3075F PR MOST RECENT SYSTOLIC BLOOD PRESS GE 130-139MM HG: ICD-10-PCS | Mod: HCNC,CPTII,S$GLB, | Performed by: INTERNAL MEDICINE

## 2019-08-14 PROCEDURE — 99499 RISK ADDL DX/OHS AUDIT: ICD-10-PCS | Mod: S$GLB,,, | Performed by: INTERNAL MEDICINE

## 2019-08-14 PROCEDURE — 80053 COMPREHEN METABOLIC PANEL: CPT | Mod: HCNC

## 2019-08-14 PROCEDURE — 99999 PR PBB SHADOW E&M-EST. PATIENT-LVL III: CPT | Mod: PBBFAC,HCNC,, | Performed by: INTERNAL MEDICINE

## 2019-08-14 PROCEDURE — 80061 LIPID PANEL: CPT | Mod: HCNC

## 2019-08-14 PROCEDURE — 84439 ASSAY OF FREE THYROXINE: CPT | Mod: HCNC

## 2019-08-14 PROCEDURE — 36415 COLL VENOUS BLD VENIPUNCTURE: CPT | Mod: HCNC,PO

## 2019-08-14 PROCEDURE — 99397 PER PM REEVAL EST PAT 65+ YR: CPT | Mod: HCNC,S$GLB,, | Performed by: INTERNAL MEDICINE

## 2019-08-14 PROCEDURE — 3075F SYST BP GE 130 - 139MM HG: CPT | Mod: HCNC,CPTII,S$GLB, | Performed by: INTERNAL MEDICINE

## 2019-08-14 PROCEDURE — 84443 ASSAY THYROID STIM HORMONE: CPT | Mod: HCNC

## 2019-08-14 NOTE — PROGRESS NOTES
HISTORY OF PRESENT ILLNESS:  Sharona Acosta is a 84 y.o. female who presents to the clinic today for a routine medical physical exam. Her last physical exam was approximately 1 years(s) ago. She presents to the clinic today with her daughter who is very involved in her care.        PAST MEDICAL HISTORY:  Past Medical History:   Diagnosis Date    Alcohol abuse     Arthritis     osteoarthritis    Atherosclerosis of aorta     noted on lumbar X-ray 4/1/2013    Back pain     Disorder of kidney and ureter     H/O: compression fracture of spine     H/O: stroke 1997    h/o stroke more than 15 yrs ago with no residual    Hyperlipidemia     Hypertension     Hypothyroidism     Leukocytosis     Mild congestive heart failure 8/14/2019    Neutrophilic leukocytosis 4/29/2017    Normal vaginal delivery     x2    Osteopenia     Sepsis due to HCAP pneumonia 4/29/2017    Stroke     Thyroid disease     hypothyroidism    Urinary incontinence     Vitamin B 12 deficiency     Vitamin D deficiency        PAST SURGICAL HISTORY:  Past Surgical History:   Procedure Laterality Date    BREAST BIOPSY      CATARACT EXTRACTION Bilateral     FIXATION KYPHOPLASTY  Sept. 2012    HYSTERECTOMY      [id number], tibia Left 6/2/2016    Performed by Khushi Tijerina III, MD at [facility] OR    JOINT REPLACEMENT      right hip replacement    KYPHOPLASTY N/A 5/15/2013    Performed by [facility] at [facility] OR    TOTAL HIP ARTHROPLASTY      TRANSESOPHAGEAL ECHOCARDIOGRAM (CHINTAN) N/A 5/1/2017    Performed by Robert Ramos MD at [facility] CATH LAB       SOCIAL HISTORY:  Social History     Socioeconomic History    Marital status:      Spouse name: Not on file    Number of children: 2    Years of education: Not on file    Highest education level: Not on file   Occupational History    Occupation: retired   Social Needs    Financial resource strain: Not on file    Food insecurity:     Worry: Not on  file     Inability: Not on file    Transportation needs:     Medical: Not on file     Non-medical: Not on file   Tobacco Use    Smoking status: Former Smoker     Types: Cigarettes     Last attempt to quit: 5/15/1997     Years since quittin.2    Smokeless tobacco: Never Used   Substance and Sexual Activity    Alcohol use: No     Alcohol/week: 12.6 oz     Types: 21 Shots of liquor per week     Comment: former drinker    Drug use: No    Sexual activity: Not on file   Lifestyle    Physical activity:     Days per week: Not on file     Minutes per session: Not on file    Stress: Not at all   Relationships    Social connections:     Talks on phone: Not on file     Gets together: Not on file     Attends Gnosticism service: Not on file     Active member of club or organization: Not on file     Attends meetings of clubs or organizations: Not on file     Relationship status: Not on file   Other Topics Concern    Not on file   Social History Narrative    Not on file       FAMILY HISTORY:  Family History   Problem Relation Age of Onset    Hypertension Mother     Stroke Mother     Hypertension Father     No Known Problems Brother     No Known Problems Maternal Grandmother     No Known Problems Maternal Grandfather     No Known Problems Paternal Grandmother     No Known Problems Paternal Grandfather     No Known Problems Maternal Aunt     No Known Problems Maternal Uncle     No Known Problems Paternal Aunt     No Known Problems Paternal Uncle     Diabetes Neg Hx     Colon cancer Neg Hx     Breast cancer Neg Hx     Amblyopia Neg Hx     Blindness Neg Hx     Cancer Neg Hx     Cataracts Neg Hx     Glaucoma Neg Hx     Macular degeneration Neg Hx     Retinal detachment Neg Hx     Strabismus Neg Hx     Thyroid disease Neg Hx        ALLERGIES AND MEDICATIONS: updated and reviewed.  Review of patient's allergies indicates:   Allergen Reactions    Atorvastatin Nausea Only     Medication List with  Changes/Refills   Current Medications    ALENDRONATE (FOSAMAX) 70 MG TABLET    TAKE ONE TABLET BY MOUTH EVERY 7 DAYS    ASPIRIN 81 MG CHEW    Take 81 mg by mouth once daily.      FOLIC ACID (FOLVITE) 1 MG TABLET    Take 1 tablet (1 mg total) by mouth once daily.    LEVOTHYROXINE (SYNTHROID) 75 MCG TABLET    Take 1 tablet (75 mcg total) by mouth once daily.    MULTIVITAMIN (THERAGRAN) TABLET    Take 1 tablet by mouth once daily.    RIVAROXABAN (XARELTO) 15 MG TAB    TAKE 1 TABLET BY MOUTH DAILY FOR DINNER OR EVENING MEAL    SIMVASTATIN (ZOCOR) 40 MG TABLET    TAKE 1 TABLET BY MOUTH EVERY EVENING    THIAMINE (VITAMIN B-1) 100 MG TABLET    Take 100 mg by mouth once daily.   Discontinued Medications    ALENDRONATE (FOSAMAX) 70 MG TABLET    TAKE 1 TABLET BY MOUTH EVERY 7 DAYS    VERAPAMIL (VERELAN) 240 MG C24P    TAKE 1 CAPSULE BY MOUTH EVERY DAY    VERAPAMIL (VERELAN) 240 MG C24P    TAKE ONE CAPSULE BY MOUTH EVERY DAY         CARE TEAM:  Patient Care Team:  Vianey Lee MD as PCP - General (Internal Medicine)           SCREENING HISTORY:  Health Maintenance       Date Due Completion Date    TETANUS VACCINE 06/25/1953 ---    Shingles Vaccine (1 of 2) 06/25/1987 ---    DEXA SCAN 09/01/2018 9/1/2016    Override on 2/9/2012: Done    Influenza Vaccine (1) 08/01/2019 3/5/2018    Lipid Panel 03/29/2023 3/29/2018            REVIEW OF SYSTEMS:   The patient reports: fair dietary habits. Her appetite is fair.  The patient reports : that they do not exercise.  Review of Systems   Constitutional: Positive for fatigue. Negative for chills, fever and unexpected weight change.   HENT: Negative for congestion and postnasal drip.    Eyes: Negative for pain and visual disturbance.   Respiratory: Positive for shortness of breath (- she easily gets short of breath with exertion.  She is very sedentary.  She has oxygen at home but only uses it when she become short of breath.  She does not sleep with it.  Her family reports home pulse  "ox readings are 91-97% without oxygen.). Negative for cough and wheezing.    Cardiovascular: Negative for chest pain, palpitations and leg swelling.   Gastrointestinal: Negative for abdominal pain, constipation, diarrhea, nausea and vomiting.   Genitourinary: Negative for dysuria.   Musculoskeletal: Positive for arthralgias, back pain and gait problem.   Skin: Negative for rash.        She has some discoloration and thickening of the left thumb nail.   Neurological: Negative for weakness and headaches.   Psychiatric/Behavioral: Negative for dysphoric mood and sleep disturbance. The patient is not nervous/anxious.         Poor memory.     Breast ROS: negative for breast lumps             Physical Examination:   Vitals:    08/14/19 1413   BP: 130/70   Pulse:    Temp:          Height: 5' 3" (160 cm)   Body mass index is 22.14 kg/m².        General appearance - alert, well appearing, and in no distress and pleasant elderly female seated in a wheelchair; accompanied by her daughter  Mental status - alert, oriented to person, place;  normal mood, behavior, speech, dress, motor activity  Eyes - pupils equal and reactive, extraocular eye movements intact, sclera anicteric  Mouth - mucous membranes moist, pharynx normal without lesions  Neck - supple, no significant adenopathy, carotids upstroke normal bilaterally, no bruits  Lymphatics - no palpable lymphadenopathy  Chest - no wheezes, symmetric air entry; mild rhonchi auscultated in the bases bilaterally posteriorly (worse on the right)  Heart - normal rate and regular rhythm, no gallops noted  Abdomen - soft, nontender, nondistended, no masses or organomegaly  Back exam - not examined  Neurological - alert, normal speech, no focal findings or movement disorder noted, cranial nerves II through XII intact  Musculoskeletal - no muscular tenderness noted, Moderate osteoarthritic changes noted to both knee joints. No joint effusions noted.  Gait not observed.  Extremities - no " pedal edema noted, Alina's sign negative bilaterally  Skin - normal coloration and turgor, no rashes, no suspicious skin lesions noted  NAILS:  Mild onychomycosis noted on the medial aspect of her left thumbnail only      ASSESSMENT AND PLAN:  1. Routine medical exam  Counseled on age appropriate medical preventative services including age appropriate cancer screenings, age appropriate eye and dental exams, over all nutritional health, need for a consistent exercise regimen, and an over all push towards maintaining a vigorous and active lifestyle.  Counseled on age appropriate vaccines and discussed upcoming health care needs based on age/gender. Discussed good sleep hygiene and stress management.     2. Hypertension, benign  Per the daughter's report her blood pressure has been well controlled at 130/70 every time the physical therapist comes to her house to check it.  They recently realized that she has not been on verapamil for the last 2 months.  I advised her to continue to hold the medication and continue to monitor home blood pressures.  They will let me know if blood pressure start to increase.  Discussed sodium restriction, maintaining ideal body weight and regular exercise program as physiologic means to achieve blood pressure control. The patient will strive towards this. The current medical regimen is effective;  continue present plan and medications. Recommended patient to check home readings to monitor and see me for followup as scheduled or sooner as needed. Patient was educated that both decongestant and anti-inflammatory medication may raise blood pressure.   - CBC auto differential; Future    3. Mild congestive heart failure/4. Pulmonary hypertension/5. Nonrheumatic aortic valve stenosis  She has oxygen to use at home.  I discussed with the patient and her daughter that her shortness of breath is due to deconditioning.  If her oxygen level stays between 91 and 97%, even with exertion, she really  does not need oxygen at home.  I will check an overnight pulse oximetry to make sure she does not need to sleep with that.  If this is also normal we can take the oxygen out of her house.  - Pulse oximetry overnight; Future    6. Hyperlipidemia, unspecified hyperlipidemia type  We discussed low fat diet and regular exercise.The current medical regimen is effective;  continue present plan and medications.    - Comprehensive metabolic panel; Future  - Lipid panel; Future    7. Hypothyroidism (acquired)  Patient is clinically euthyroid. Continue current regimen.   - TSH; Future    8. H/O: stroke  Stable/asymptomatic.  We discussed risk factor reduction.     9. Osteopenia, senile/10. Vitamin D deficiency  We discussed adequate calcium and vitamin D supplementation. We discussed fall precautions. She is scheduled for her BMD. Further treatment plan will be based on results.  She has been on Fosamax for more than 5 years.  We may need to consider a drug holiday or change her medication if the bone density test show she still needs prescription medication.    11. Atherosclerosis of aorta  Patient with Atherosclerosis of the Aorta.  Stable/asymptomatic. Currently stable on lipid lowering medication and b/p monitoring.     12. Interstitial pulmonary disease  Stable. Observe.    13. History of deep vein thrombosis (DVT) of lower extremity  We discussed the risks and benefits of continuing on Xarelto.  For now the benefits outweighed the risks and we should continue on at.    14. Flu vaccine need  Patient advised to get flu shot in September/October.     15. Disorder of bone and cartilage    - DXA Bone Density Spine And Hip; Future    16. Need for Tdap vaccination  Patient was advised to get immunization at the pharmacy.     17. Need for shingles vaccine  I discussed with the patient and her daughter that I am on the fence about her still getting the shingles vaccine due to age and current comorbidities.    18.  Onychomycosis  Her symptoms are very mild.  I discussed with them to use over-the-counter Vicks Vaporub topically as, especially around the nail bed.  We will defer treatment with oral Lamisil unless she becomes symptomatic.    19. Renal insufficiency  Her kidney function last year was decreased.  We will recheck blood work at this time.  She may have developed chronic kidney disease stage 3 at her age.  We discussed avoiding anti-inflammatory medication and staying well hydrated.  - PTH, intact; Future    20. Mild cognitive impairment  Stable.  Observe.  Consider further evaluation by Neurology if symptoms worsen or persist.    21. Wheelchair dependence  She is currently doing home physical therapy.          Follow up in about 6 months (around 2/14/2020), or if symptoms worsen or fail to improve, for follow up chronic medical conditions.. or sooner as needed.

## 2019-08-23 ENCOUNTER — EXTERNAL HOME HEALTH (OUTPATIENT)
Dept: HOME HEALTH SERVICES | Facility: HOSPITAL | Age: 84
End: 2019-08-23
Payer: MEDICARE

## 2019-08-23 DIAGNOSIS — E78.5 HYPERLIPIDEMIA, UNSPECIFIED HYPERLIPIDEMIA TYPE: ICD-10-CM

## 2019-08-26 ENCOUNTER — TELEPHONE (OUTPATIENT)
Dept: HOME HEALTH SERVICES | Facility: HOSPITAL | Age: 84
End: 2019-08-26

## 2019-08-26 RX ORDER — SIMVASTATIN 40 MG/1
TABLET, FILM COATED ORAL
Qty: 90 TABLET | Refills: 1 | Status: SHIPPED | OUTPATIENT
Start: 2019-08-26 | End: 2020-03-10

## 2019-08-26 RX ORDER — LEVOTHYROXINE SODIUM 75 UG/1
TABLET ORAL
Qty: 90 TABLET | Refills: 1 | Status: SHIPPED | OUTPATIENT
Start: 2019-08-26 | End: 2020-02-28

## 2019-08-29 ENCOUNTER — PATIENT MESSAGE (OUTPATIENT)
Dept: FAMILY MEDICINE | Facility: CLINIC | Age: 84
End: 2019-08-29

## 2019-09-04 ENCOUNTER — PATIENT MESSAGE (OUTPATIENT)
Dept: FAMILY MEDICINE | Facility: CLINIC | Age: 84
End: 2019-09-04

## 2019-09-10 ENCOUNTER — HOSPITAL ENCOUNTER (OUTPATIENT)
Dept: RADIOLOGY | Facility: CLINIC | Age: 84
Discharge: HOME | End: 2019-09-10
Attending: INTERNAL MEDICINE
Payer: MEDICARE

## 2019-09-10 DIAGNOSIS — M89.9 DISORDER OF BONE AND CARTILAGE: ICD-10-CM

## 2019-09-10 DIAGNOSIS — M94.9 DISORDER OF BONE AND CARTILAGE: ICD-10-CM

## 2019-09-10 PROCEDURE — 77080 DEXA BONE DENSITY SPINE HIP: ICD-10-PCS | Mod: 26,HCNC,, | Performed by: INTERNAL MEDICINE

## 2019-09-10 PROCEDURE — 77080 DXA BONE DENSITY AXIAL: CPT | Mod: 26,HCNC,, | Performed by: INTERNAL MEDICINE

## 2019-09-10 PROCEDURE — 77080 DXA BONE DENSITY AXIAL: CPT | Mod: TC,HCNC,PO

## 2019-09-16 ENCOUNTER — PATIENT MESSAGE (OUTPATIENT)
Dept: FAMILY MEDICINE | Facility: CLINIC | Age: 84
End: 2019-09-16

## 2019-09-18 ENCOUNTER — PATIENT MESSAGE (OUTPATIENT)
Dept: FAMILY MEDICINE | Facility: CLINIC | Age: 84
End: 2019-09-18

## 2019-12-17 ENCOUNTER — PATIENT MESSAGE (OUTPATIENT)
Dept: FAMILY MEDICINE | Facility: CLINIC | Age: 84
End: 2019-12-17

## 2019-12-17 ENCOUNTER — PATIENT OUTREACH (OUTPATIENT)
Dept: ADMINISTRATIVE | Facility: HOSPITAL | Age: 84
End: 2019-12-17

## 2019-12-17 NOTE — PROGRESS NOTES
Medication Adherence - statin, medication refill on 08/26/19 90 tablets w/ 1 refill. Left message for patient to call.

## 2020-01-22 ENCOUNTER — PATIENT MESSAGE (OUTPATIENT)
Dept: FAMILY MEDICINE | Facility: CLINIC | Age: 85
End: 2020-01-22

## 2020-01-24 NOTE — TELEPHONE ENCOUNTER
Spoke with patient's daughter and she said that the bill came back and she thinks the insurance company will be sending us out another form to process her bill. Advise her to contact the oxygen company and give them the new insurance information for billing. Contact her if they need anything for us to do.

## 2020-01-27 ENCOUNTER — PES CALL (OUTPATIENT)
Dept: ADMINISTRATIVE | Facility: CLINIC | Age: 85
End: 2020-01-27

## 2020-02-06 ENCOUNTER — PATIENT MESSAGE (OUTPATIENT)
Dept: FAMILY MEDICINE | Facility: CLINIC | Age: 85
End: 2020-02-06

## 2020-02-06 DIAGNOSIS — I12.9 BENIGN HYPERTENSION WITH CHRONIC KIDNEY DISEASE, STAGE III: Primary | ICD-10-CM

## 2020-02-06 DIAGNOSIS — N18.30 BENIGN HYPERTENSION WITH CHRONIC KIDNEY DISEASE, STAGE III: Primary | ICD-10-CM

## 2020-02-24 ENCOUNTER — TELEPHONE (OUTPATIENT)
Dept: FAMILY MEDICINE | Facility: CLINIC | Age: 85
End: 2020-02-24

## 2020-02-24 NOTE — TELEPHONE ENCOUNTER
----- Message from Cherelle House sent at 2/21/2020  1:36 PM CST -----  Contact: Renee with [facility] [phone] ext [phone]  Type:Call Back    Who called: Renee with [facility]    What is the request in detail:Calling to find out if a fax was received on 01-. Please call.     Would the patient rather a call back or a response via [facility]? Call back    Best call back number: [phone] ext [phone]

## 2020-02-28 RX ORDER — LEVOTHYROXINE SODIUM 75 UG/1
TABLET ORAL
Qty: 90 TABLET | Refills: 0 | Status: SHIPPED | OUTPATIENT
Start: 2020-02-28 | End: 2020-06-08

## 2020-03-10 DIAGNOSIS — E78.5 HYPERLIPIDEMIA, UNSPECIFIED HYPERLIPIDEMIA TYPE: ICD-10-CM

## 2020-03-10 RX ORDER — SIMVASTATIN 40 MG/1
TABLET, FILM COATED ORAL
Qty: 90 TABLET | Refills: 0 | Status: SHIPPED | OUTPATIENT
Start: 2020-03-10 | End: 2020-06-08

## 2020-03-18 ENCOUNTER — PATIENT MESSAGE (OUTPATIENT)
Dept: FAMILY MEDICINE | Facility: CLINIC | Age: 85
End: 2020-03-18

## 2020-03-18 DIAGNOSIS — R30.0 DYSURIA: Primary | ICD-10-CM

## 2020-03-18 RX ORDER — SULFAMETHOXAZOLE AND TRIMETHOPRIM 800; 160 MG/1; MG/1
1 TABLET ORAL 2 TIMES DAILY
Qty: 6 TABLET | Refills: 0 | Status: SHIPPED | OUTPATIENT
Start: 2020-03-18 | End: 2020-03-21

## 2020-03-19 ENCOUNTER — OFFICE VISIT (OUTPATIENT)
Dept: HOME HEALTH SERVICES | Facility: CLINIC | Age: 85
End: 2020-03-19
Payer: MEDICARE

## 2020-03-19 VITALS
WEIGHT: 130 LBS | BODY MASS INDEX: 23.04 KG/M2 | DIASTOLIC BLOOD PRESSURE: 97 MMHG | SYSTOLIC BLOOD PRESSURE: 153 MMHG | HEIGHT: 63 IN | HEART RATE: 70 BPM

## 2020-03-19 DIAGNOSIS — I27.20 PULMONARY HYPERTENSION: ICD-10-CM

## 2020-03-19 DIAGNOSIS — E03.9 HYPOTHYROIDISM (ACQUIRED): ICD-10-CM

## 2020-03-19 DIAGNOSIS — J84.9 INTERSTITIAL PULMONARY DISEASE: ICD-10-CM

## 2020-03-19 DIAGNOSIS — Z99.3 DEPENDENCE ON WHEELCHAIR: ICD-10-CM

## 2020-03-19 DIAGNOSIS — I50.9 MILD CONGESTIVE HEART FAILURE: ICD-10-CM

## 2020-03-19 DIAGNOSIS — E78.5 HYPERLIPIDEMIA, UNSPECIFIED HYPERLIPIDEMIA TYPE: ICD-10-CM

## 2020-03-19 DIAGNOSIS — Z99.81 DEPENDENCE ON SUPPLEMENTAL OXYGEN: ICD-10-CM

## 2020-03-19 DIAGNOSIS — N18.30 BENIGN HYPERTENSION WITH CHRONIC KIDNEY DISEASE, STAGE III: ICD-10-CM

## 2020-03-19 DIAGNOSIS — N18.30 CKD (CHRONIC KIDNEY DISEASE) STAGE 3, GFR 30-59 ML/MIN: ICD-10-CM

## 2020-03-19 DIAGNOSIS — Z86.718 HISTORY OF DEEP VEIN THROMBOSIS (DVT) OF LOWER EXTREMITY: ICD-10-CM

## 2020-03-19 DIAGNOSIS — G31.84 MILD COGNITIVE IMPAIRMENT: ICD-10-CM

## 2020-03-19 DIAGNOSIS — R26.9 ABNORMALITY OF GAIT AND MOBILITY: ICD-10-CM

## 2020-03-19 DIAGNOSIS — Z86.73 H/O: STROKE: ICD-10-CM

## 2020-03-19 DIAGNOSIS — I12.9 BENIGN HYPERTENSION WITH CHRONIC KIDNEY DISEASE, STAGE III: ICD-10-CM

## 2020-03-19 DIAGNOSIS — I35.0 NONRHEUMATIC AORTIC VALVE STENOSIS: ICD-10-CM

## 2020-03-19 DIAGNOSIS — Z00.00 ENCOUNTER FOR PREVENTIVE HEALTH EXAMINATION: Primary | ICD-10-CM

## 2020-03-19 DIAGNOSIS — M85.80 OSTEOPENIA, SENILE: ICD-10-CM

## 2020-03-19 DIAGNOSIS — Z87.81 HISTORY OF VERTEBRAL FRACTURE: ICD-10-CM

## 2020-03-19 DIAGNOSIS — I70.0 ATHEROSCLEROSIS OF AORTA: ICD-10-CM

## 2020-03-19 PROCEDURE — 3080F DIAST BP >= 90 MM HG: CPT | Mod: CPTII,S$GLB,, | Performed by: NURSE PRACTITIONER

## 2020-03-19 PROCEDURE — 3077F SYST BP >= 140 MM HG: CPT | Mod: CPTII,S$GLB,, | Performed by: NURSE PRACTITIONER

## 2020-03-19 PROCEDURE — G0439 PPPS, SUBSEQ VISIT: HCPCS | Mod: S$GLB,,, | Performed by: NURSE PRACTITIONER

## 2020-03-19 PROCEDURE — 99499 UNLISTED E&M SERVICE: CPT | Mod: S$GLB,,, | Performed by: NURSE PRACTITIONER

## 2020-03-19 PROCEDURE — 3077F PR MOST RECENT SYSTOLIC BLOOD PRESSURE >= 140 MM HG: ICD-10-PCS | Mod: CPTII,S$GLB,, | Performed by: NURSE PRACTITIONER

## 2020-03-19 PROCEDURE — G0439 PR MEDICARE ANNUAL WELLNESS SUBSEQUENT VISIT: ICD-10-PCS | Mod: S$GLB,,, | Performed by: NURSE PRACTITIONER

## 2020-03-19 PROCEDURE — 3080F PR MOST RECENT DIASTOLIC BLOOD PRESSURE >= 90 MM HG: ICD-10-PCS | Mod: CPTII,S$GLB,, | Performed by: NURSE PRACTITIONER

## 2020-03-19 PROCEDURE — 99499 RISK ADDL DX/OHS AUDIT: ICD-10-PCS | Mod: S$GLB,,, | Performed by: NURSE PRACTITIONER

## 2020-03-19 NOTE — PATIENT INSTRUCTIONS
Counseling and Referral of Other Preventative  (Italic type indicates deductible and co-insurance are waived)    Patient Name: Sharona Acosta  Today's Date: 3/19/2020    Health Maintenance       Date Due Completion Date    Shingles Vaccine (1 of 2) 04/06/2020 (Originally 6/25/1985) ---    DEXA SCAN 09/10/2021 9/10/2019    Override on 2/9/2012: Done    Lipid Panel 08/14/2024 8/14/2019    TETANUS VACCINE 09/10/2029 9/10/2019        No orders of the defined types were placed in this encounter.    The following information is provided to all patients.  This information is to help you find resources for any of the problems found today that may be affecting your health:                Living healthy guide: www.[facility].[place].gov      Understanding Diabetes: www.diabetes.org      Eating healthy: www.cdc.gov/healthyweight      [facility] home safety checklist: www.cdc.gov/steadi/patient.html      Agency on Aging: www.goea.[place].gov      Alcoholics anonymous (AA): www.aa.org      Physical Activity: www.maria fernanda.nih.gov/yd6xjuh      Tobacco use: www.quitwithusla.org

## 2020-03-19 NOTE — PROGRESS NOTES
"Sharona Acosta presented for a  Medicare AWV and comprehensive Health Risk Assessment today. The following components were reviewed and updated:    · Medical history  · Family History  · Social history  · Allergies and Current Medications  · Health Risk Assessment  · Health Maintenance  · Care Team     ** See Completed Assessments for Annual Wellness Visit within the encounter summary.**       The following assessments were completed:  · Living Situation  · CAGE  · Depression Screening  · Timed Get Up and Go  · Whisper Test  · Cognitive Function Screening  · Nutrition Screening  · ADL Screening  · PAQ Screening    Vitals:    03/19/20 1005   BP: (!) 153/97   Pulse: 70   Weight: 59 kg (130 lb)   Height: 5' 3" (1.6 m)     Body mass index is 23.03 kg/m².  Physical Exam   Constitutional: She appears well-developed.   HENT:   Head: Normocephalic and atraumatic.   Eyes: EOM are normal.   Neck: Normal range of motion.   Cardiovascular: Normal rate, regular rhythm and normal heart sounds.   Pulmonary/Chest: Effort normal and breath sounds normal. No respiratory distress.   Abdominal: Soft. Bowel sounds are normal. She exhibits no distension.   Musculoskeletal: Normal range of motion. She exhibits no edema.   Patient is wheelchair bound   Neurological: She is alert.   Oriented to person and place, not time.   Skin: Skin is warm and dry.   Psychiatric: She has a normal mood and affect. Her behavior is normal.         Diagnoses and health risks identified today and associated recommendations/orders:    1. Encounter for preventive health examination  Assessment completed. Preventive measures reviewed with patient and patients spouse.    2. Dependence on supplemental oxygen  Stable, followed by PCP.    3. Dependence on wheelchair  Stable, followed by PCP.    4. Abnormality of gait and mobility  Stable, followed by PCP.    5. H/O: stroke  Stable, followed by PCP.    6. History of vertebral fracture  Stable, followed by PCP.    7. Mild " cognitive impairment  Stable, followed by PCP.    8. Interstitial pulmonary disease  Stable, followed by PCP.    9. Pulmonary hypertension  Stable, followed by PCP.    10. Atherosclerosis of aorta  Stable, followed by PCP.    11. Hyperlipidemia, unspecified hyperlipidemia type  Stable, followed by PCP.    12. Nonrheumatic aortic valve stenosis  Stable, followed by PCP.    13. Benign hypertension with chronic kidney disease, stage III  Stable, followed by PCP.    14. History of deep vein thrombosis (DVT) of lower extremity  Stable, followed by PCP and Hematology/Oncology.    15. Hypothyroidism (acquired)  Stable, followed by PCP.    16. Osteopenia, senile  Stable, followed by PCP.    17. Mild congestive heart failure  Stable, followed by PCP.    18. CKD (chronic kidney disease) stage 3, GFR 30-59 ml/min  Stable, followed by PCP.    Provided Sharona with a 5-10 year written screening schedule and personal prevention plan. Recommendations were developed using the USPSTF age appropriate recommendations. Education, counseling, and referrals were provided as needed. After Visit Summary printed and given to patient which includes a list of additional screenings\tests needed.    No follow-ups on file.    Frances Stahl NP  I offered to discuss end of life issues, including information on how to make advance directives that the patient could use to name someone who would make medical decisions on their behalf if they became too ill to make themselves.    ___Patient declined  _X_Patient is interested, I provided paper work and offered to discuss.

## 2020-04-17 ENCOUNTER — PATIENT MESSAGE (OUTPATIENT)
Dept: FAMILY MEDICINE | Facility: CLINIC | Age: 85
End: 2020-04-17

## 2020-04-17 NOTE — TELEPHONE ENCOUNTER
I am definitely okay with approving the oxygen for January.  I do not believe I have gotten any paperwork to sign, however.  Please contact the DME company and see will we need to do to have this corrected.  Thanks!

## 2020-05-01 NOTE — TELEPHONE ENCOUNTER
Spoke with Keron with [employer] billing dept and informed her of message received from pt's daughter. After further research she states the necessary adjustments were made and the bill for January has been removed from pt's account.Spoke with pt's daughter and informed her of information.

## 2020-05-03 ENCOUNTER — PATIENT MESSAGE (OUTPATIENT)
Dept: FAMILY MEDICINE | Facility: CLINIC | Age: 85
End: 2020-05-03

## 2020-05-04 ENCOUNTER — PATIENT MESSAGE (OUTPATIENT)
Dept: FAMILY MEDICINE | Facility: CLINIC | Age: 85
End: 2020-05-04

## 2020-05-09 DIAGNOSIS — I82.409 DVT (DEEP VENOUS THROMBOSIS): ICD-10-CM

## 2020-05-10 RX ORDER — RIVAROXABAN 15 MG/1
TABLET, FILM COATED ORAL
Qty: 30 TABLET | Refills: 1 | Status: SHIPPED | OUTPATIENT
Start: 2020-05-10 | End: 2020-07-15

## 2020-07-15 ENCOUNTER — OFFICE VISIT (OUTPATIENT)
Dept: FAMILY MEDICINE | Facility: CLINIC | Age: 85
End: 2020-07-15
Payer: MEDICARE

## 2020-07-15 VITALS
HEART RATE: 70 BPM | SYSTOLIC BLOOD PRESSURE: 130 MMHG | BODY MASS INDEX: 23.03 KG/M2 | OXYGEN SATURATION: 96 % | DIASTOLIC BLOOD PRESSURE: 84 MMHG | HEIGHT: 63 IN | TEMPERATURE: 97 F

## 2020-07-15 DIAGNOSIS — G31.84 MILD COGNITIVE IMPAIRMENT: ICD-10-CM

## 2020-07-15 DIAGNOSIS — E55.9 VITAMIN D DEFICIENCY: ICD-10-CM

## 2020-07-15 DIAGNOSIS — M85.80 OSTEOPENIA, SENILE: ICD-10-CM

## 2020-07-15 DIAGNOSIS — Z23 NEED FOR SHINGLES VACCINE: ICD-10-CM

## 2020-07-15 DIAGNOSIS — Z99.3 WHEELCHAIR DEPENDENCE: ICD-10-CM

## 2020-07-15 DIAGNOSIS — I35.0 NONRHEUMATIC AORTIC VALVE STENOSIS: ICD-10-CM

## 2020-07-15 DIAGNOSIS — Z00.00 ROUTINE MEDICAL EXAM: Primary | ICD-10-CM

## 2020-07-15 DIAGNOSIS — I12.9 BENIGN HYPERTENSION WITH CHRONIC KIDNEY DISEASE, STAGE III: ICD-10-CM

## 2020-07-15 DIAGNOSIS — E78.5 HYPERLIPIDEMIA, UNSPECIFIED HYPERLIPIDEMIA TYPE: ICD-10-CM

## 2020-07-15 DIAGNOSIS — N18.30 BENIGN HYPERTENSION WITH CHRONIC KIDNEY DISEASE, STAGE III: ICD-10-CM

## 2020-07-15 DIAGNOSIS — R53.1 WEAKNESS: ICD-10-CM

## 2020-07-15 DIAGNOSIS — I70.0 ATHEROSCLEROSIS OF AORTA: ICD-10-CM

## 2020-07-15 DIAGNOSIS — J84.9 INTERSTITIAL PULMONARY DISEASE: ICD-10-CM

## 2020-07-15 DIAGNOSIS — Z78.9 ALCOHOL USE: Chronic | ICD-10-CM

## 2020-07-15 DIAGNOSIS — Z71.89 ADVANCED DIRECTIVES, COUNSELING/DISCUSSION: ICD-10-CM

## 2020-07-15 DIAGNOSIS — I27.20 PULMONARY HYPERTENSION: ICD-10-CM

## 2020-07-15 DIAGNOSIS — E03.9 HYPOTHYROIDISM (ACQUIRED): ICD-10-CM

## 2020-07-15 DIAGNOSIS — I50.9 MILD CONGESTIVE HEART FAILURE: ICD-10-CM

## 2020-07-15 PROCEDURE — 99397 PER PM REEVAL EST PAT 65+ YR: CPT | Mod: HCNC,S$GLB,, | Performed by: INTERNAL MEDICINE

## 2020-07-15 PROCEDURE — 99999 PR PBB SHADOW E&M-EST. PATIENT-LVL IV: CPT | Mod: PBBFAC,HCNC,, | Performed by: INTERNAL MEDICINE

## 2020-07-15 PROCEDURE — 99499 UNLISTED E&M SERVICE: CPT | Mod: S$GLB,,, | Performed by: INTERNAL MEDICINE

## 2020-07-15 PROCEDURE — 99999 PR PBB SHADOW E&M-EST. PATIENT-LVL IV: ICD-10-PCS | Mod: PBBFAC,HCNC,, | Performed by: INTERNAL MEDICINE

## 2020-07-15 PROCEDURE — 3079F PR MOST RECENT DIASTOLIC BLOOD PRESSURE 80-89 MM HG: ICD-10-PCS | Mod: HCNC,CPTII,S$GLB, | Performed by: INTERNAL MEDICINE

## 2020-07-15 PROCEDURE — 99499 RISK ADDL DX/OHS AUDIT: ICD-10-PCS | Mod: S$GLB,,, | Performed by: INTERNAL MEDICINE

## 2020-07-15 PROCEDURE — 3075F PR MOST RECENT SYSTOLIC BLOOD PRESS GE 130-139MM HG: ICD-10-PCS | Mod: HCNC,CPTII,S$GLB, | Performed by: INTERNAL MEDICINE

## 2020-07-15 PROCEDURE — 3075F SYST BP GE 130 - 139MM HG: CPT | Mod: HCNC,CPTII,S$GLB, | Performed by: INTERNAL MEDICINE

## 2020-07-15 PROCEDURE — 3079F DIAST BP 80-89 MM HG: CPT | Mod: HCNC,CPTII,S$GLB, | Performed by: INTERNAL MEDICINE

## 2020-07-15 PROCEDURE — 99397 PR PREVENTIVE VISIT,EST,65 & OVER: ICD-10-PCS | Mod: HCNC,S$GLB,, | Performed by: INTERNAL MEDICINE

## 2020-07-15 NOTE — PROGRESS NOTES
HISTORY OF PRESENT ILLNESS:  Sharona Acosta is a 85 y.o. female who presents to the clinic today for a routine physical exam. Her last physical exam was approximately 1 years(s) ago.  She was accompanied today by her daughter.        PAST MEDICAL HISTORY:  Past Medical History:   Diagnosis Date    Alcohol abuse     Arthritis     osteoarthritis    Atherosclerosis of aorta     noted on lumbar X-ray 2013    Back pain     Disorder of kidney and ureter     H/O: compression fracture of spine     H/O: stroke     h/o stroke more than 15 yrs ago with no residual    Hyperlipidemia     Hypertension     Hypothyroidism     Leukocytosis     Mild congestive heart failure 2019    Neutrophilic leukocytosis 2017    Normal vaginal delivery     x2    Osteopenia     Sepsis due to HCAP pneumonia 2017    Stroke     Thyroid disease     hypothyroidism    Urinary incontinence     Vitamin B 12 deficiency     Vitamin D deficiency        PAST SURGICAL HISTORY:  Past Surgical History:   Procedure Laterality Date    BREAST BIOPSY      CATARACT EXTRACTION Bilateral     FIXATION KYPHOPLASTY  Sept.      HYSTERECTOMY      JOINT REPLACEMENT      right hip replacement    TOTAL HIP ARTHROPLASTY         SOCIAL HISTORY:  Social History     Socioeconomic History    Marital status:      Spouse name: Not on file    Number of children: 2    Years of education: Not on file    Highest education level: Not on file   Occupational History    Occupation: retired   Social Needs    Financial resource strain: Not on file    Food insecurity     Worry: Not on file     Inability: Not on file    Transportation needs     Medical: Not on file     Non-medical: Not on file   Tobacco Use    Smoking status: Former Smoker     Types: Cigarettes     Quit date: 5/15/1997     Years since quittin.1    Smokeless tobacco: Never Used   Substance and Sexual Activity    Alcohol use: No     Frequency: Patient  refused     Drinks per session: Patient refused     Binge frequency: Never    Drug use: No    Sexual activity: Not Currently   Lifestyle    Physical activity     Days per week: Not on file     Minutes per session: Not on file    Stress: Not at all   Relationships    Social connections     Talks on phone: Once a week     Gets together: Three times a week     Attends [employer] service: Not on file     Active member of club or organization: No     Attends meetings of clubs or organizations: Never     Relationship status:    Other Topics Concern    Not on file   Social History Narrative    Not on file       FAMILY HISTORY:  Family History   Problem Relation Age of Onset    Hypertension Mother     Stroke Mother     Hypertension Father     No Known Problems Brother     No Known Problems Maternal Grandmother     No Known Problems Maternal Grandfather     No Known Problems Paternal Grandmother     No Known Problems Paternal Grandfather     No Known Problems Maternal Aunt     No Known Problems Maternal Uncle     No Known Problems Paternal Aunt     No Known Problems Paternal Uncle     Diabetes Neg Hx     Colon cancer Neg Hx     Breast cancer Neg Hx     Amblyopia Neg Hx     Blindness Neg Hx     Cancer Neg Hx     Cataracts Neg Hx     Glaucoma Neg Hx     Macular degeneration Neg Hx     Retinal detachment Neg Hx     Strabismus Neg Hx     Thyroid disease Neg Hx        ALLERGIES AND MEDICATIONS: updated and reviewed.  Review of patient's allergies indicates:   Allergen Reactions    Atorvastatin Nausea Only     Medication List with Changes/Refills   Current Medications    ASPIRIN 81 MG CHEW    Take 81 mg by mouth once daily.      CALCIUM CARBONATE/VITAMIN D3 (VITAMIN D-3 ORAL)    Take 50 mcg by mouth.    FOLIC ACID (FOLVITE) 1 MG TABLET    Take 1 tablet (1 mg total) by mouth once daily.    LEVOTHYROXINE (SYNTHROID) 75 MCG TABLET    TAKE 1 TABLET BY MOUTH EVERY DAY    MULTIVITAMIN (THERAGRAN)  TABLET    Take 1 tablet by mouth once daily.    SIMVASTATIN (ZOCOR) 40 MG TABLET    TAKE 1 TABLET BY MOUTH EVERY EVENING    THIAMINE (VITAMIN B-1) 100 MG TABLET    Take 100 mg by mouth once daily.   Discontinued Medications    ALENDRONATE (FOSAMAX) 70 MG TABLET    TAKE ONE TABLET BY MOUTH EVERY 7 DAYS    XARELTO 15 MG TAB    TAKE 1 TABLET BY MOUTH DAILY FOR DINNER OR EVENING MEAL          CARE TEAM:  Patient Care Team:  Vianey Lee MD as PCP - General (Internal Medicine)  Nessa Benito LPN as Licensed Practical Nurse  Erin Duffy OD as Consulting Physician (Optometry)  Chris Fuentes MD as Consulting Physician (Hematology and Oncology)           SCREENING HISTORY:  Health Maintenance       Date Due Completion Date    Shingles Vaccine (1 of 2) 06/25/1987 ---    Influenza Vaccine (1) 09/01/2020 9/10/2019    DEXA SCAN 09/10/2021 9/10/2019    Override on 2/9/2012: Done    Lipid Panel 08/14/2024 8/14/2019    TETANUS VACCINE 08/29/2029 8/29/2019            REVIEW OF SYSTEMS:   The patient reports: fair dietary habits.  The patient reports : that they are unable to exercise because  of orthopaedic limitations. She has been very sedentary during the Covid 19 pandemic and her family is concerned that she has gotten much weaker and needs more assistance.  Review of Systems   Constitutional: Positive for activity change and fatigue. Negative for unexpected weight change.   HENT: Negative for hearing loss, rhinorrhea and trouble swallowing.    Eyes: Negative for discharge and visual disturbance.   Respiratory: Negative for chest tightness and wheezing.    Cardiovascular: Negative for chest pain and palpitations.   Gastrointestinal: Negative for blood in stool, constipation, diarrhea and vomiting.   Endocrine: Negative for polydipsia.   Genitourinary: Negative for difficulty urinating, dysuria, hematuria and menstrual problem.   Musculoskeletal: Positive for arthralgias. Negative for joint swelling and neck  "pain.   Neurological: Positive for weakness. Negative for headaches.   Psychiatric/Behavioral: Negative for dysphoric mood.      ROS (Optional)-: no pelvic pain  Breast ROS (Optional)-: negative for breast lumps/discharge            Physical Examination:   Vitals:    07/15/20 1029   BP: 130/84   Pulse: 70   Temp: 97.2 °F (36.2 °C)         Height: 5' 3" (160 cm)   Body mass index is 23.03 kg/m².      Patient did not require to have a chaperone present during the exam today.    General appearance - alert, well appearing, and in no distress, pleasant elderly female, seated in a wheelchair, exam limited as patient could not get onto the examination table  Psychiatric - alert, oriented to person and place only, fair memory, good insight  Eyes - pupils equal and reactive, extraocular eye movements intact, sclera anicteric  Mouth - not examined; patient wearing mask due to Covid 19 pandemic  Neck - supple, no significant adenopathy, carotids upstroke normal bilaterally, no bruits, thyroid exam: thyroid is normal in size without nodules or tenderness  Lymphatics - no palpable cervical lymphadenopathy  Chest - clear to auscultation, no wheezes, rales or rhonchi, symmetric air entry  Heart - normal rate and regular rhythm, no gallops noted  Abdomen - not examined  Breasts - not examined  Back exam - moderate limit in range of motion noted on exam due to age, no significant pain with motion noted during exam, in depth exam deferred  Neurological - alert, normal speech, no focal findings or movement disorder noted, cranial nerves II through XII intact  Musculoskeletal - patient noted to have Moderate osteoarthritic changes to both knee joints. No joint effusions noted., no muscular tenderness noted, mild osteoarthritic changes noted in both hands, gait not observed  Extremities - no pedal edema noted  Skin - normal coloration and turgor, no rashes, no suspicious skin lesions noted      Labs:  Ordered/Scheduled      ASSESSMENT " AND PLAN:  1. Routine medical exam  Counseled on age appropriate medical preventative services including age appropriate cancer screenings, age appropriate eye and dental exams, over all nutritional health, need for a consistent exercise regimen, and an over all push towards maintaining a vigorous and active lifestyle.  Counseled on age appropriate vaccines and discussed upcoming health care needs based on age/gender. Discussed good sleep hygiene and stress management.    2. Benign hypertension with chronic kidney disease, stage III  Discussed sodium restriction, maintaining ideal body weight and regular exercise program as physiologic means to achieve blood pressure control. The patient will strive towards this.   The current medical regimen is effective;  continue present plan and medications. Recommended patient to check home readings to monitor and see me for followup as scheduled or sooner as needed.   Patient was educated that both decongestant and anti-inflammatory medication may raise blood pressure.  Stable decreased kidney function. Observe. Patient counseled to avoid/minimize the use of anti-inflammatory  Medication. Discussed to stay well hydrated. Also discussed with patient that good control of blood pressure and/or diabetes, if present, will help to prevent progression.  The patient is not interested in the digital hypertension program.  - CBC auto differential; Future    3. Nonrheumatic aortic valve stenosis/4. Mild congestive heart failure/5. Pulmonary hypertension  The current medical regimen is effective;  continue present plan and medications.   Followed by: Cardiology.     6. Hyperlipidemia, unspecified hyperlipidemia type  We discussed low fat diet and regular exercise.The current medical regimen is effective;  continue present plan and medications.   - Lipid Panel; Future  - Comprehensive metabolic panel; Future    7. Hypothyroidism (acquired)  Patient is clinically euthyroid. Continue current  regimen.  - TSH; Future    8. Interstitial pulmonary disease  Stable. Observe.    9. Atherosclerosis of aorta  Patient with Atherosclerosis of the Aorta.  Stable/asymptomatic. Currently stable on lipid lowering medication and b/p monitoring.    10. Osteopenia, senile/11. Vitamin D deficiency  We discussed adequate calcium and vitamin D supplementation. We discussed fall precautions. She is up to date on her BMD. No need for prescription medication at this time    12. Mild cognitive impairment  Stable. Observe.    13. Alcohol use  Recommended abstinence due to fall risk.    14. Wheelchair dependence  We discussed fall precautions.    15. Need for shingles vaccine  Patient was advised to get immunization at the pharmacy.    16. Advanced directives, counseling/discussion  Completed during office visit today. Also completed involvement of care paperwork. Briefly discussed [place] but not [place].    17. Weakness  Family is very concerned about her progressive weakness due to her being sedentary so much and isolated during this COVID-19 pandemic.  They are requesting evaluation by physical therapy to see if they can help her with some strengthening.  - Ambulatory referral/consult to Home Health; Future          Follow up in about 6 months (around 1/15/2021), or if symptoms worsen or fail to improve, for follow up chronic medical conditions.. or sooner as needed.

## 2020-07-16 PROCEDURE — G0180 PR HOME HEALTH MD CERTIFICATION: ICD-10-PCS | Mod: ,,, | Performed by: INTERNAL MEDICINE

## 2020-07-16 PROCEDURE — G0180 MD CERTIFICATION HHA PATIENT: HCPCS | Mod: ,,, | Performed by: INTERNAL MEDICINE

## 2020-08-04 ENCOUNTER — EXTERNAL HOME HEALTH (OUTPATIENT)
Dept: HOME HEALTH SERVICES | Facility: HOSPITAL | Age: 85
End: 2020-08-04
Payer: MEDICARE

## 2020-12-22 RX ORDER — LEVOTHYROXINE SODIUM 75 UG/1
75 TABLET ORAL DAILY
Qty: 90 TABLET | Refills: 0 | Status: SHIPPED | OUTPATIENT
Start: 2020-12-22 | End: 2021-04-13 | Stop reason: SDUPTHER

## 2020-12-22 NOTE — TELEPHONE ENCOUNTER
Spoke with patient scheduled annual office visit 1/29/21 at 10:20 AM. Lab appointment scheduled 1/29/21 at 9:45 AM verbalized understanding.

## 2021-01-27 ENCOUNTER — PATIENT MESSAGE (OUTPATIENT)
Dept: FAMILY MEDICINE | Facility: CLINIC | Age: 86
End: 2021-01-27

## 2021-01-29 ENCOUNTER — OFFICE VISIT (OUTPATIENT)
Dept: FAMILY MEDICINE | Facility: CLINIC | Age: 86
End: 2021-01-29
Payer: MEDICARE

## 2021-01-29 ENCOUNTER — LAB VISIT (OUTPATIENT)
Dept: LAB | Facility: HOSPITAL | Age: 86
End: 2021-01-29
Attending: INTERNAL MEDICINE
Payer: MEDICARE

## 2021-01-29 VITALS — OXYGEN SATURATION: 97 % | BODY MASS INDEX: 23.03 KG/M2 | TEMPERATURE: 98 F | HEIGHT: 63 IN | HEART RATE: 69 BPM

## 2021-01-29 DIAGNOSIS — N18.30 BENIGN HYPERTENSION WITH CHRONIC KIDNEY DISEASE, STAGE III: ICD-10-CM

## 2021-01-29 DIAGNOSIS — E78.5 HYPERLIPIDEMIA, UNSPECIFIED HYPERLIPIDEMIA TYPE: ICD-10-CM

## 2021-01-29 DIAGNOSIS — Z23 FLU VACCINE NEED: ICD-10-CM

## 2021-01-29 DIAGNOSIS — I70.0 ATHEROSCLEROSIS OF AORTA: ICD-10-CM

## 2021-01-29 DIAGNOSIS — I12.9 BENIGN HYPERTENSION WITH CHRONIC KIDNEY DISEASE, STAGE III: ICD-10-CM

## 2021-01-29 DIAGNOSIS — I12.9 BENIGN HYPERTENSION WITH CHRONIC KIDNEY DISEASE, STAGE III: Primary | ICD-10-CM

## 2021-01-29 DIAGNOSIS — N18.30 BENIGN HYPERTENSION WITH CHRONIC KIDNEY DISEASE, STAGE III: Primary | ICD-10-CM

## 2021-01-29 DIAGNOSIS — Z23 NEED FOR SHINGLES VACCINE: ICD-10-CM

## 2021-01-29 DIAGNOSIS — E03.9 HYPOTHYROIDISM (ACQUIRED): ICD-10-CM

## 2021-01-29 DIAGNOSIS — I35.0 NONRHEUMATIC AORTIC VALVE STENOSIS: ICD-10-CM

## 2021-01-29 DIAGNOSIS — G31.84 MILD COGNITIVE IMPAIRMENT: ICD-10-CM

## 2021-01-29 DIAGNOSIS — Z99.3 WHEELCHAIR DEPENDENCE: ICD-10-CM

## 2021-01-29 DIAGNOSIS — M85.80 OSTEOPENIA, SENILE: ICD-10-CM

## 2021-01-29 DIAGNOSIS — I50.9 MILD CONGESTIVE HEART FAILURE: ICD-10-CM

## 2021-01-29 DIAGNOSIS — I27.20 PULMONARY HYPERTENSION: ICD-10-CM

## 2021-01-29 DIAGNOSIS — J84.9 INTERSTITIAL PULMONARY DISEASE: ICD-10-CM

## 2021-01-29 LAB
ALBUMIN SERPL BCP-MCNC: 3.8 G/DL (ref 3.5–5.2)
ALP SERPL-CCNC: 78 U/L (ref 55–135)
ALT SERPL W/O P-5'-P-CCNC: 7 U/L (ref 10–44)
ANION GAP SERPL CALC-SCNC: 7 MMOL/L (ref 8–16)
AST SERPL-CCNC: 16 U/L (ref 10–40)
BASOPHILS # BLD AUTO: 0.07 K/UL (ref 0–0.2)
BASOPHILS NFR BLD: 0.6 % (ref 0–1.9)
BILIRUB SERPL-MCNC: 0.6 MG/DL (ref 0.1–1)
BUN SERPL-MCNC: 12 MG/DL (ref 8–23)
CALCIUM SERPL-MCNC: 9.5 MG/DL (ref 8.7–10.5)
CHLORIDE SERPL-SCNC: 106 MMOL/L (ref 95–110)
CHOLEST SERPL-MCNC: 127 MG/DL (ref 120–199)
CHOLEST/HDLC SERPL: 2.5 {RATIO} (ref 2–5)
CO2 SERPL-SCNC: 28 MMOL/L (ref 23–29)
CREAT SERPL-MCNC: 0.9 MG/DL (ref 0.5–1.4)
DIFFERENTIAL METHOD: ABNORMAL
EOSINOPHIL # BLD AUTO: 0.2 K/UL (ref 0–0.5)
EOSINOPHIL NFR BLD: 1.8 % (ref 0–8)
ERYTHROCYTE [DISTWIDTH] IN BLOOD BY AUTOMATED COUNT: 14 % (ref 11.5–14.5)
EST. GFR  (AFRICAN AMERICAN): >60 ML/MIN/1.73 M^2
EST. GFR  (NON AFRICAN AMERICAN): 59.3 ML/MIN/1.73 M^2
GLUCOSE SERPL-MCNC: 79 MG/DL (ref 70–110)
HCT VFR BLD AUTO: 43.8 % (ref 37–48.5)
HDLC SERPL-MCNC: 51 MG/DL (ref 40–75)
HDLC SERPL: 40.2 % (ref 20–50)
HGB BLD-MCNC: 13.4 G/DL (ref 12–16)
IMM GRANULOCYTES # BLD AUTO: 0.05 K/UL (ref 0–0.04)
IMM GRANULOCYTES NFR BLD AUTO: 0.4 % (ref 0–0.5)
LDLC SERPL CALC-MCNC: 60.4 MG/DL (ref 63–159)
LYMPHOCYTES # BLD AUTO: 3.5 K/UL (ref 1–4.8)
LYMPHOCYTES NFR BLD: 30.6 % (ref 18–48)
MCH RBC QN AUTO: 30.7 PG (ref 27–31)
MCHC RBC AUTO-ENTMCNC: 30.6 G/DL (ref 32–36)
MCV RBC AUTO: 101 FL (ref 82–98)
MONOCYTES # BLD AUTO: 0.9 K/UL (ref 0.3–1)
MONOCYTES NFR BLD: 7.6 % (ref 4–15)
NEUTROPHILS # BLD AUTO: 6.8 K/UL (ref 1.8–7.7)
NEUTROPHILS NFR BLD: 59 % (ref 38–73)
NONHDLC SERPL-MCNC: 76 MG/DL
NRBC BLD-RTO: 0 /100 WBC
PLATELET # BLD AUTO: 202 K/UL (ref 150–350)
PMV BLD AUTO: 13.7 FL (ref 9.2–12.9)
POTASSIUM SERPL-SCNC: 4.2 MMOL/L (ref 3.5–5.1)
PROT SERPL-MCNC: 7.4 G/DL (ref 6–8.4)
RBC # BLD AUTO: 4.36 M/UL (ref 4–5.4)
SODIUM SERPL-SCNC: 141 MMOL/L (ref 136–145)
TRIGL SERPL-MCNC: 78 MG/DL (ref 30–150)
TSH SERPL DL<=0.005 MIU/L-ACNC: 1 UIU/ML (ref 0.4–4)
WBC # BLD AUTO: 11.45 K/UL (ref 3.9–12.7)

## 2021-01-29 PROCEDURE — 1159F PR MEDICATION LIST DOCUMENTED IN MEDICAL RECORD: ICD-10-PCS | Mod: HCNC,S$GLB,, | Performed by: INTERNAL MEDICINE

## 2021-01-29 PROCEDURE — 1101F PR PT FALLS ASSESS DOC 0-1 FALLS W/OUT INJ PAST YR: ICD-10-PCS | Mod: HCNC,CPTII,S$GLB, | Performed by: INTERNAL MEDICINE

## 2021-01-29 PROCEDURE — 36415 COLL VENOUS BLD VENIPUNCTURE: CPT | Mod: HCNC,PO

## 2021-01-29 PROCEDURE — 99499 UNLISTED E&M SERVICE: CPT | Mod: S$GLB,,, | Performed by: INTERNAL MEDICINE

## 2021-01-29 PROCEDURE — 99499 RISK ADDL DX/OHS AUDIT: ICD-10-PCS | Mod: S$GLB,,, | Performed by: INTERNAL MEDICINE

## 2021-01-29 PROCEDURE — 90694 FLU VACCINE - QUADRIVALENT - ADJUVANTED: ICD-10-PCS | Mod: HCNC,S$GLB,, | Performed by: INTERNAL MEDICINE

## 2021-01-29 PROCEDURE — 84443 ASSAY THYROID STIM HORMONE: CPT | Mod: HCNC

## 2021-01-29 PROCEDURE — 80053 COMPREHEN METABOLIC PANEL: CPT | Mod: HCNC

## 2021-01-29 PROCEDURE — 1101F PT FALLS ASSESS-DOCD LE1/YR: CPT | Mod: HCNC,CPTII,S$GLB, | Performed by: INTERNAL MEDICINE

## 2021-01-29 PROCEDURE — 90694 VACC AIIV4 NO PRSRV 0.5ML IM: CPT | Mod: HCNC,S$GLB,, | Performed by: INTERNAL MEDICINE

## 2021-01-29 PROCEDURE — G0008 FLU VACCINE - QUADRIVALENT - ADJUVANTED: ICD-10-PCS | Mod: HCNC,S$GLB,, | Performed by: INTERNAL MEDICINE

## 2021-01-29 PROCEDURE — G0008 ADMIN INFLUENZA VIRUS VAC: HCPCS | Mod: HCNC,S$GLB,, | Performed by: INTERNAL MEDICINE

## 2021-01-29 PROCEDURE — 3288F PR FALLS RISK ASSESSMENT DOCUMENTED: ICD-10-PCS | Mod: HCNC,CPTII,S$GLB, | Performed by: INTERNAL MEDICINE

## 2021-01-29 PROCEDURE — 80061 LIPID PANEL: CPT | Mod: HCNC

## 2021-01-29 PROCEDURE — 1159F MED LIST DOCD IN RCRD: CPT | Mod: HCNC,S$GLB,, | Performed by: INTERNAL MEDICINE

## 2021-01-29 PROCEDURE — 1157F ADVNC CARE PLAN IN RCRD: CPT | Mod: HCNC,S$GLB,, | Performed by: INTERNAL MEDICINE

## 2021-01-29 PROCEDURE — 1126F AMNT PAIN NOTED NONE PRSNT: CPT | Mod: HCNC,S$GLB,, | Performed by: INTERNAL MEDICINE

## 2021-01-29 PROCEDURE — 1126F PR PAIN SEVERITY QUANTIFIED, NO PAIN PRESENT: ICD-10-PCS | Mod: HCNC,S$GLB,, | Performed by: INTERNAL MEDICINE

## 2021-01-29 PROCEDURE — 99214 PR OFFICE/OUTPT VISIT, EST, LEVL IV, 30-39 MIN: ICD-10-PCS | Mod: HCNC,25,S$GLB, | Performed by: INTERNAL MEDICINE

## 2021-01-29 PROCEDURE — 1157F PR ADVANCE CARE PLAN OR EQUIV PRESENT IN MEDICAL RECORD: ICD-10-PCS | Mod: HCNC,S$GLB,, | Performed by: INTERNAL MEDICINE

## 2021-01-29 PROCEDURE — 3288F FALL RISK ASSESSMENT DOCD: CPT | Mod: HCNC,CPTII,S$GLB, | Performed by: INTERNAL MEDICINE

## 2021-01-29 PROCEDURE — 99999 PR PBB SHADOW E&M-EST. PATIENT-LVL III: ICD-10-PCS | Mod: PBBFAC,HCNC,, | Performed by: INTERNAL MEDICINE

## 2021-01-29 PROCEDURE — 99999 PR PBB SHADOW E&M-EST. PATIENT-LVL III: CPT | Mod: PBBFAC,HCNC,, | Performed by: INTERNAL MEDICINE

## 2021-01-29 PROCEDURE — 85025 COMPLETE CBC W/AUTO DIFF WBC: CPT | Mod: HCNC

## 2021-01-29 PROCEDURE — 99214 OFFICE O/P EST MOD 30 MIN: CPT | Mod: HCNC,25,S$GLB, | Performed by: INTERNAL MEDICINE

## 2021-02-04 ENCOUNTER — TELEPHONE (OUTPATIENT)
Dept: FAMILY MEDICINE | Facility: CLINIC | Age: 86
End: 2021-02-04

## 2021-02-05 ENCOUNTER — TELEPHONE (OUTPATIENT)
Dept: FAMILY MEDICINE | Facility: CLINIC | Age: 86
End: 2021-02-05

## 2021-02-25 ENCOUNTER — PES CALL (OUTPATIENT)
Dept: ADMINISTRATIVE | Facility: CLINIC | Age: 86
End: 2021-02-25

## 2021-04-13 DIAGNOSIS — E78.5 HYPERLIPIDEMIA, UNSPECIFIED HYPERLIPIDEMIA TYPE: ICD-10-CM

## 2021-04-13 RX ORDER — SIMVASTATIN 40 MG/1
40 TABLET, FILM COATED ORAL NIGHTLY
Qty: 90 TABLET | Refills: 0 | Status: SHIPPED | OUTPATIENT
Start: 2021-04-13 | End: 2021-08-23

## 2021-04-13 RX ORDER — LEVOTHYROXINE SODIUM 75 UG/1
75 TABLET ORAL DAILY
Qty: 90 TABLET | Refills: 0 | Status: SHIPPED | OUTPATIENT
Start: 2021-04-13

## 2021-05-03 ENCOUNTER — PATIENT MESSAGE (OUTPATIENT)
Dept: FAMILY MEDICINE | Facility: CLINIC | Age: 86
End: 2021-05-03

## 2021-05-03 DIAGNOSIS — G31.84 MILD COGNITIVE IMPAIRMENT: Primary | ICD-10-CM

## 2021-05-03 DIAGNOSIS — R53.1 WEAKNESS: ICD-10-CM

## 2021-05-03 DIAGNOSIS — Z86.73 H/O: STROKE: ICD-10-CM

## 2021-05-03 DIAGNOSIS — Z99.3 WHEELCHAIR DEPENDENCE: ICD-10-CM

## 2021-05-06 PROCEDURE — G0180 MD CERTIFICATION HHA PATIENT: HCPCS | Mod: ,,, | Performed by: INTERNAL MEDICINE

## 2021-05-06 PROCEDURE — G0180 PR HOME HEALTH MD CERTIFICATION: ICD-10-PCS | Mod: ,,, | Performed by: INTERNAL MEDICINE

## 2021-05-17 ENCOUNTER — EXTERNAL HOME HEALTH (OUTPATIENT)
Dept: HOME HEALTH SERVICES | Facility: HOSPITAL | Age: 86
End: 2021-05-17
Payer: MEDICARE

## 2021-05-20 ENCOUNTER — DOCUMENT SCAN (OUTPATIENT)
Dept: HOME HEALTH SERVICES | Facility: HOSPITAL | Age: 86
End: 2021-05-20
Payer: MEDICARE

## 2021-05-27 ENCOUNTER — DOCUMENT SCAN (OUTPATIENT)
Dept: HOME HEALTH SERVICES | Facility: HOSPITAL | Age: 86
End: 2021-05-27
Payer: MEDICARE

## 2021-06-16 ENCOUNTER — OFFICE VISIT (OUTPATIENT)
Dept: FAMILY MEDICINE | Facility: CLINIC | Age: 86
End: 2021-06-16
Payer: MEDICARE

## 2021-06-16 VITALS
OXYGEN SATURATION: 95 % | BODY MASS INDEX: 18.64 KG/M2 | TEMPERATURE: 98 F | WEIGHT: 105.19 LBS | HEIGHT: 63 IN | SYSTOLIC BLOOD PRESSURE: 124 MMHG | HEART RATE: 64 BPM | DIASTOLIC BLOOD PRESSURE: 90 MMHG

## 2021-06-16 DIAGNOSIS — I50.9 MILD CONGESTIVE HEART FAILURE: ICD-10-CM

## 2021-06-16 DIAGNOSIS — J84.9 INTERSTITIAL PULMONARY DISEASE: ICD-10-CM

## 2021-06-16 DIAGNOSIS — Z99.3 WHEELCHAIR DEPENDENCE: ICD-10-CM

## 2021-06-16 DIAGNOSIS — E03.9 HYPOTHYROIDISM (ACQUIRED): ICD-10-CM

## 2021-06-16 DIAGNOSIS — G31.84 MILD COGNITIVE IMPAIRMENT: ICD-10-CM

## 2021-06-16 DIAGNOSIS — G89.29 CHRONIC BILATERAL LOW BACK PAIN WITHOUT SCIATICA: ICD-10-CM

## 2021-06-16 DIAGNOSIS — I70.0 ATHEROSCLEROSIS OF AORTA: ICD-10-CM

## 2021-06-16 DIAGNOSIS — M54.50 CHRONIC BILATERAL LOW BACK PAIN WITHOUT SCIATICA: ICD-10-CM

## 2021-06-16 DIAGNOSIS — I12.9 BENIGN HYPERTENSION WITH CHRONIC KIDNEY DISEASE, STAGE III: Primary | ICD-10-CM

## 2021-06-16 DIAGNOSIS — Z71.89 ADVANCED DIRECTIVES, COUNSELING/DISCUSSION: ICD-10-CM

## 2021-06-16 DIAGNOSIS — L98.9 ARM SKIN LESION, RIGHT: ICD-10-CM

## 2021-06-16 DIAGNOSIS — N18.30 BENIGN HYPERTENSION WITH CHRONIC KIDNEY DISEASE, STAGE III: Primary | ICD-10-CM

## 2021-06-16 DIAGNOSIS — E78.5 HYPERLIPIDEMIA, UNSPECIFIED HYPERLIPIDEMIA TYPE: ICD-10-CM

## 2021-06-16 DIAGNOSIS — I27.20 PULMONARY HYPERTENSION: ICD-10-CM

## 2021-06-16 DIAGNOSIS — Z23 NEED FOR SHINGLES VACCINE: ICD-10-CM

## 2021-06-16 PROCEDURE — 99214 OFFICE O/P EST MOD 30 MIN: CPT | Mod: S$GLB,,, | Performed by: INTERNAL MEDICINE

## 2021-06-16 PROCEDURE — 3074F PR MOST RECENT SYSTOLIC BLOOD PRESSURE < 130 MM HG: ICD-10-PCS | Mod: CPTII,S$GLB,, | Performed by: INTERNAL MEDICINE

## 2021-06-16 PROCEDURE — 3074F SYST BP LT 130 MM HG: CPT | Mod: CPTII,S$GLB,, | Performed by: INTERNAL MEDICINE

## 2021-06-16 PROCEDURE — 99999 PR PBB SHADOW E&M-EST. PATIENT-LVL IV: CPT | Mod: PBBFAC,,, | Performed by: INTERNAL MEDICINE

## 2021-06-16 PROCEDURE — 99499 RISK ADDL DX/OHS AUDIT: ICD-10-PCS | Mod: S$GLB,,, | Performed by: INTERNAL MEDICINE

## 2021-06-16 PROCEDURE — 1159F MED LIST DOCD IN RCRD: CPT | Mod: S$GLB,,, | Performed by: INTERNAL MEDICINE

## 2021-06-16 PROCEDURE — 1157F ADVNC CARE PLAN IN RCRD: CPT | Mod: S$GLB,,, | Performed by: INTERNAL MEDICINE

## 2021-06-16 PROCEDURE — 1159F PR MEDICATION LIST DOCUMENTED IN MEDICAL RECORD: ICD-10-PCS | Mod: S$GLB,,, | Performed by: INTERNAL MEDICINE

## 2021-06-16 PROCEDURE — 1126F AMNT PAIN NOTED NONE PRSNT: CPT | Mod: S$GLB,,, | Performed by: INTERNAL MEDICINE

## 2021-06-16 PROCEDURE — 99214 PR OFFICE/OUTPT VISIT, EST, LEVL IV, 30-39 MIN: ICD-10-PCS | Mod: S$GLB,,, | Performed by: INTERNAL MEDICINE

## 2021-06-16 PROCEDURE — 1100F PTFALLS ASSESS-DOCD GE2>/YR: CPT | Mod: CPTII,S$GLB,, | Performed by: INTERNAL MEDICINE

## 2021-06-16 PROCEDURE — 3288F FALL RISK ASSESSMENT DOCD: CPT | Mod: CPTII,S$GLB,, | Performed by: INTERNAL MEDICINE

## 2021-06-16 PROCEDURE — 3080F DIAST BP >= 90 MM HG: CPT | Mod: CPTII,S$GLB,, | Performed by: INTERNAL MEDICINE

## 2021-06-16 PROCEDURE — 99499 UNLISTED E&M SERVICE: CPT | Mod: S$GLB,,, | Performed by: INTERNAL MEDICINE

## 2021-06-16 PROCEDURE — 3288F PR FALLS RISK ASSESSMENT DOCUMENTED: ICD-10-PCS | Mod: CPTII,S$GLB,, | Performed by: INTERNAL MEDICINE

## 2021-06-16 PROCEDURE — 3080F PR MOST RECENT DIASTOLIC BLOOD PRESSURE >= 90 MM HG: ICD-10-PCS | Mod: CPTII,S$GLB,, | Performed by: INTERNAL MEDICINE

## 2021-06-16 PROCEDURE — 99999 PR PBB SHADOW E&M-EST. PATIENT-LVL IV: ICD-10-PCS | Mod: PBBFAC,,, | Performed by: INTERNAL MEDICINE

## 2021-06-16 PROCEDURE — 1100F PR PT FALLS ASSESS DOC 2+ FALLS/FALL W/INJURY/YR: ICD-10-PCS | Mod: CPTII,S$GLB,, | Performed by: INTERNAL MEDICINE

## 2021-06-16 PROCEDURE — 1126F PR PAIN SEVERITY QUANTIFIED, NO PAIN PRESENT: ICD-10-PCS | Mod: S$GLB,,, | Performed by: INTERNAL MEDICINE

## 2021-06-16 PROCEDURE — 1157F PR ADVANCE CARE PLAN OR EQUIV PRESENT IN MEDICAL RECORD: ICD-10-PCS | Mod: S$GLB,,, | Performed by: INTERNAL MEDICINE

## 2021-07-09 ENCOUNTER — PATIENT MESSAGE (OUTPATIENT)
Dept: FAMILY MEDICINE | Facility: CLINIC | Age: 86
End: 2021-07-09

## 2021-07-24 ENCOUNTER — PATIENT MESSAGE (OUTPATIENT)
Dept: FAMILY MEDICINE | Facility: CLINIC | Age: 86
End: 2021-07-24

## 2021-07-27 ENCOUNTER — HOSPITAL ENCOUNTER (INPATIENT)
Facility: HOSPITAL | Age: 86
LOS: 7 days | Discharge: HOSPICE | DRG: 280 | End: 2021-08-03
Attending: EMERGENCY MEDICINE | Admitting: INTERNAL MEDICINE
Payer: MEDICARE

## 2021-07-27 DIAGNOSIS — R53.1 WEAKNESS: ICD-10-CM

## 2021-07-27 DIAGNOSIS — J18.9 PNEUMONIA DUE TO INFECTIOUS ORGANISM, UNSPECIFIED LATERALITY, UNSPECIFIED PART OF LUNG: ICD-10-CM

## 2021-07-27 DIAGNOSIS — E87.1 HYPONATREMIA: ICD-10-CM

## 2021-07-27 DIAGNOSIS — R93.0 ABNORMAL HEAD CT: ICD-10-CM

## 2021-07-27 DIAGNOSIS — I21.4 NSTEMI (NON-ST ELEVATED MYOCARDIAL INFARCTION): Primary | ICD-10-CM

## 2021-07-27 DIAGNOSIS — Z51.5 PALLIATIVE CARE ENCOUNTER: ICD-10-CM

## 2021-07-27 DIAGNOSIS — R07.9 CHEST PAIN: ICD-10-CM

## 2021-07-27 DIAGNOSIS — N30.00 ACUTE CYSTITIS WITHOUT HEMATURIA: ICD-10-CM

## 2021-07-27 DIAGNOSIS — I63.81 CEREBROVASCULAR ACCIDENT (CVA) OF RIGHT THALAMUS: ICD-10-CM

## 2021-07-27 DIAGNOSIS — I50.32 CHRONIC DIASTOLIC HEART FAILURE: ICD-10-CM

## 2021-07-27 PROBLEM — K59.00 CONSTIPATION: Status: ACTIVE | Noted: 2021-07-27

## 2021-07-27 LAB
ALBUMIN SERPL BCP-MCNC: 3.5 G/DL (ref 3.5–5.2)
ALP SERPL-CCNC: 110 U/L (ref 55–135)
ALT SERPL W/O P-5'-P-CCNC: 6 U/L (ref 10–44)
AMMONIA PLAS-SCNC: 46 UMOL/L (ref 10–50)
AMPHET+METHAMPHET UR QL: NEGATIVE
ANION GAP SERPL CALC-SCNC: 11 MMOL/L (ref 8–16)
APAP SERPL-MCNC: <3 UG/ML (ref 10–20)
APTT BLDCRRT: 27.9 SEC (ref 21–32)
AST SERPL-CCNC: 27 U/L (ref 10–40)
BACTERIA #/AREA URNS HPF: ABNORMAL /HPF
BARBITURATES UR QL SCN>200 NG/ML: NEGATIVE
BASOPHILS # BLD AUTO: 0.11 K/UL (ref 0–0.2)
BASOPHILS NFR BLD: 0.8 % (ref 0–1.9)
BENZODIAZ UR QL SCN>200 NG/ML: NEGATIVE
BILIRUB SERPL-MCNC: 0.7 MG/DL (ref 0.1–1)
BILIRUB UR QL STRIP: NEGATIVE
BNP SERPL-MCNC: 249 PG/ML (ref 0–99)
BUN SERPL-MCNC: 19 MG/DL (ref 8–23)
BZE UR QL SCN: NEGATIVE
CALCIUM SERPL-MCNC: 10.5 MG/DL (ref 8.7–10.5)
CANNABINOIDS UR QL SCN: NEGATIVE
CHLORIDE SERPL-SCNC: 100 MMOL/L (ref 95–110)
CLARITY UR: ABNORMAL
CO2 SERPL-SCNC: 22 MMOL/L (ref 23–29)
COLOR UR: ABNORMAL
CREAT SERPL-MCNC: 0.9 MG/DL (ref 0.5–1.4)
CREAT UR-MCNC: 85.8 MG/DL (ref 15–325)
CTP QC/QA: YES
DIFFERENTIAL METHOD: ABNORMAL
EOSINOPHIL # BLD AUTO: 0.1 K/UL (ref 0–0.5)
EOSINOPHIL NFR BLD: 0.8 % (ref 0–8)
ERYTHROCYTE [DISTWIDTH] IN BLOOD BY AUTOMATED COUNT: 13.2 % (ref 11.5–14.5)
EST. GFR  (AFRICAN AMERICAN): >60 ML/MIN/1.73 M^2
EST. GFR  (NON AFRICAN AMERICAN): 59 ML/MIN/1.73 M^2
ETHANOL SERPL-MCNC: <10 MG/DL
GLUCOSE SERPL-MCNC: 94 MG/DL (ref 70–110)
GLUCOSE UR QL STRIP: NEGATIVE
HCT VFR BLD AUTO: 46.9 % (ref 37–48.5)
HGB BLD-MCNC: 15.4 G/DL (ref 12–16)
HGB UR QL STRIP: ABNORMAL
HYALINE CASTS #/AREA URNS LPF: 0 /LPF
IMM GRANULOCYTES # BLD AUTO: 0.15 K/UL (ref 0–0.04)
IMM GRANULOCYTES NFR BLD AUTO: 1 % (ref 0–0.5)
INR PPP: 1.3 (ref 0.8–1.2)
KETONES UR QL STRIP: NEGATIVE
LACTATE SERPL-SCNC: 2.5 MMOL/L (ref 0.5–2.2)
LACTATE SERPL-SCNC: 3 MMOL/L (ref 0.5–2.2)
LEUKOCYTE ESTERASE UR QL STRIP: ABNORMAL
LYMPHOCYTES # BLD AUTO: 3.7 K/UL (ref 1–4.8)
LYMPHOCYTES NFR BLD: 25.8 % (ref 18–48)
MCH RBC QN AUTO: 29.9 PG (ref 27–31)
MCHC RBC AUTO-ENTMCNC: 32.8 G/DL (ref 32–36)
MCV RBC AUTO: 91 FL (ref 82–98)
METHADONE UR QL SCN>300 NG/ML: NEGATIVE
MICROSCOPIC COMMENT: ABNORMAL
MONOCYTES # BLD AUTO: 1.2 K/UL (ref 0.3–1)
MONOCYTES NFR BLD: 8.2 % (ref 4–15)
NEUTROPHILS # BLD AUTO: 9.1 K/UL (ref 1.8–7.7)
NEUTROPHILS NFR BLD: 63.4 % (ref 38–73)
NITRITE UR QL STRIP: POSITIVE
NON-SQ EPI CELLS #/AREA URNS HPF: 1 /HPF
NRBC BLD-RTO: 0 /100 WBC
OPIATES UR QL SCN: NEGATIVE
PCP UR QL SCN>25 NG/ML: NEGATIVE
PH UR STRIP: 6 [PH] (ref 5–8)
PLATELET # BLD AUTO: 156 K/UL (ref 150–450)
PMV BLD AUTO: 11.5 FL (ref 9.2–12.9)
POTASSIUM SERPL-SCNC: 4.2 MMOL/L (ref 3.5–5.1)
PROCALCITONIN SERPL IA-MCNC: 0.07 NG/ML
PROT SERPL-MCNC: 8 G/DL (ref 6–8.4)
PROT UR QL STRIP: ABNORMAL
PROTHROMBIN TIME: 13.3 SEC (ref 9–12.5)
RBC # BLD AUTO: 5.15 M/UL (ref 4–5.4)
RBC #/AREA URNS HPF: 17 /HPF (ref 0–4)
SALICYLATES SERPL-MCNC: <5 MG/DL (ref 15–30)
SARS-COV-2 RDRP RESP QL NAA+PROBE: NEGATIVE
SODIUM SERPL-SCNC: 133 MMOL/L (ref 136–145)
SP GR UR STRIP: 1.01 (ref 1–1.03)
TOXICOLOGY INFORMATION: NORMAL
TROPONIN I SERPL DL<=0.01 NG/ML-MCNC: 1.67 NG/ML (ref 0–0.03)
TROPONIN I SERPL DL<=0.01 NG/ML-MCNC: 1.79 NG/ML (ref 0–0.03)
TSH SERPL DL<=0.005 MIU/L-ACNC: 2.95 UIU/ML (ref 0.4–4)
URN SPEC COLLECT METH UR: ABNORMAL
UROBILINOGEN UR STRIP-ACNC: NEGATIVE EU/DL
WBC # BLD AUTO: 14.43 K/UL (ref 3.9–12.7)
WBC #/AREA URNS HPF: >100 /HPF (ref 0–5)
WBC CLUMPS URNS QL MICRO: ABNORMAL

## 2021-07-27 PROCEDURE — 85025 COMPLETE CBC W/AUTO DIFF WBC: CPT | Performed by: PHYSICIAN ASSISTANT

## 2021-07-27 PROCEDURE — 80053 COMPREHEN METABOLIC PANEL: CPT | Performed by: PHYSICIAN ASSISTANT

## 2021-07-27 PROCEDURE — 85610 PROTHROMBIN TIME: CPT | Performed by: EMERGENCY MEDICINE

## 2021-07-27 PROCEDURE — 81000 URINALYSIS NONAUTO W/SCOPE: CPT | Mod: 59 | Performed by: PHYSICIAN ASSISTANT

## 2021-07-27 PROCEDURE — 87077 CULTURE AEROBIC IDENTIFY: CPT | Performed by: PHYSICIAN ASSISTANT

## 2021-07-27 PROCEDURE — 83605 ASSAY OF LACTIC ACID: CPT | Mod: 91 | Performed by: EMERGENCY MEDICINE

## 2021-07-27 PROCEDURE — 82077 ASSAY SPEC XCP UR&BREATH IA: CPT | Performed by: EMERGENCY MEDICINE

## 2021-07-27 PROCEDURE — 80179 DRUG ASSAY SALICYLATE: CPT | Performed by: EMERGENCY MEDICINE

## 2021-07-27 PROCEDURE — 80143 DRUG ASSAY ACETAMINOPHEN: CPT | Performed by: EMERGENCY MEDICINE

## 2021-07-27 PROCEDURE — 84484 ASSAY OF TROPONIN QUANT: CPT | Mod: 91 | Performed by: EMERGENCY MEDICINE

## 2021-07-27 PROCEDURE — 87088 URINE BACTERIA CULTURE: CPT | Performed by: PHYSICIAN ASSISTANT

## 2021-07-27 PROCEDURE — 63600175 PHARM REV CODE 636 W HCPCS: Performed by: EMERGENCY MEDICINE

## 2021-07-27 PROCEDURE — 80307 DRUG TEST PRSMV CHEM ANLYZR: CPT | Performed by: EMERGENCY MEDICINE

## 2021-07-27 PROCEDURE — 99223 1ST HOSP IP/OBS HIGH 75: CPT | Mod: 25,,, | Performed by: INTERNAL MEDICINE

## 2021-07-27 PROCEDURE — 99291 CRITICAL CARE FIRST HOUR: CPT

## 2021-07-27 PROCEDURE — 83036 HEMOGLOBIN GLYCOSYLATED A1C: CPT | Performed by: PHYSICIAN ASSISTANT

## 2021-07-27 PROCEDURE — 85730 THROMBOPLASTIN TIME PARTIAL: CPT | Performed by: EMERGENCY MEDICINE

## 2021-07-27 PROCEDURE — 82140 ASSAY OF AMMONIA: CPT | Performed by: EMERGENCY MEDICINE

## 2021-07-27 PROCEDURE — 84484 ASSAY OF TROPONIN QUANT: CPT | Performed by: PHYSICIAN ASSISTANT

## 2021-07-27 PROCEDURE — U0002 COVID-19 LAB TEST NON-CDC: HCPCS | Performed by: EMERGENCY MEDICINE

## 2021-07-27 PROCEDURE — 83880 ASSAY OF NATRIURETIC PEPTIDE: CPT | Performed by: EMERGENCY MEDICINE

## 2021-07-27 PROCEDURE — 99223 PR INITIAL HOSPITAL CARE,LEVL III: ICD-10-PCS | Mod: 25,,, | Performed by: INTERNAL MEDICINE

## 2021-07-27 PROCEDURE — 25000003 PHARM REV CODE 250: Performed by: EMERGENCY MEDICINE

## 2021-07-27 PROCEDURE — 87186 SC STD MICRODIL/AGAR DIL: CPT | Performed by: PHYSICIAN ASSISTANT

## 2021-07-27 PROCEDURE — 84145 PROCALCITONIN (PCT): CPT | Performed by: EMERGENCY MEDICINE

## 2021-07-27 PROCEDURE — 25000003 PHARM REV CODE 250: Performed by: PHYSICIAN ASSISTANT

## 2021-07-27 PROCEDURE — 84443 ASSAY THYROID STIM HORMONE: CPT | Performed by: EMERGENCY MEDICINE

## 2021-07-27 PROCEDURE — 99222 1ST HOSP IP/OBS MODERATE 55: CPT | Mod: ,,, | Performed by: PSYCHIATRY & NEUROLOGY

## 2021-07-27 PROCEDURE — 99222 PR INITIAL HOSPITAL CARE,LEVL II: ICD-10-PCS | Mod: ,,, | Performed by: PSYCHIATRY & NEUROLOGY

## 2021-07-27 PROCEDURE — 12000002 HC ACUTE/MED SURGE SEMI-PRIVATE ROOM

## 2021-07-27 PROCEDURE — 93005 ELECTROCARDIOGRAM TRACING: CPT

## 2021-07-27 PROCEDURE — 93010 ELECTROCARDIOGRAM REPORT: CPT | Mod: ,,, | Performed by: INTERNAL MEDICINE

## 2021-07-27 PROCEDURE — 87040 BLOOD CULTURE FOR BACTERIA: CPT | Mod: 59 | Performed by: EMERGENCY MEDICINE

## 2021-07-27 PROCEDURE — 93010 EKG 12-LEAD: ICD-10-PCS | Mod: ,,, | Performed by: INTERNAL MEDICINE

## 2021-07-27 PROCEDURE — 87086 URINE CULTURE/COLONY COUNT: CPT | Performed by: PHYSICIAN ASSISTANT

## 2021-07-27 RX ORDER — ACETAMINOPHEN 325 MG/1
650 TABLET ORAL EVERY 4 HOURS PRN
Status: DISCONTINUED | OUTPATIENT
Start: 2021-07-27 | End: 2021-08-03 | Stop reason: HOSPADM

## 2021-07-27 RX ORDER — SODIUM CHLORIDE 0.9 % (FLUSH) 0.9 %
10 SYRINGE (ML) INJECTION EVERY 8 HOURS
Status: DISCONTINUED | OUTPATIENT
Start: 2021-07-27 | End: 2021-08-03 | Stop reason: HOSPADM

## 2021-07-27 RX ORDER — IBUPROFEN 200 MG
16 TABLET ORAL
Status: DISCONTINUED | OUTPATIENT
Start: 2021-07-27 | End: 2021-08-03 | Stop reason: HOSPADM

## 2021-07-27 RX ORDER — GLUCAGON 1 MG
1 KIT INJECTION
Status: DISCONTINUED | OUTPATIENT
Start: 2021-07-27 | End: 2021-08-03 | Stop reason: HOSPADM

## 2021-07-27 RX ORDER — ONDANSETRON 2 MG/ML
4 INJECTION INTRAMUSCULAR; INTRAVENOUS EVERY 8 HOURS PRN
Status: DISCONTINUED | OUTPATIENT
Start: 2021-07-27 | End: 2021-08-03 | Stop reason: HOSPADM

## 2021-07-27 RX ORDER — ASPIRIN 325 MG
325 TABLET, DELAYED RELEASE (ENTERIC COATED) ORAL
Status: COMPLETED | OUTPATIENT
Start: 2021-07-27 | End: 2021-07-27

## 2021-07-27 RX ORDER — LANOLIN ALCOHOL/MO/W.PET/CERES
800 CREAM (GRAM) TOPICAL
Status: DISCONTINUED | OUTPATIENT
Start: 2021-07-27 | End: 2021-08-03 | Stop reason: HOSPADM

## 2021-07-27 RX ORDER — IBUPROFEN 200 MG
24 TABLET ORAL
Status: DISCONTINUED | OUTPATIENT
Start: 2021-07-27 | End: 2021-08-03 | Stop reason: HOSPADM

## 2021-07-27 RX ORDER — CLOPIDOGREL 300 MG/1
600 TABLET, FILM COATED ORAL
Status: COMPLETED | OUTPATIENT
Start: 2021-07-27 | End: 2021-07-27

## 2021-07-27 RX ORDER — TALC
6 POWDER (GRAM) TOPICAL NIGHTLY PRN
Status: DISCONTINUED | OUTPATIENT
Start: 2021-07-27 | End: 2021-08-03 | Stop reason: HOSPADM

## 2021-07-27 RX ORDER — HEPARIN SODIUM,PORCINE/D5W 25000/250
0-40 INTRAVENOUS SOLUTION INTRAVENOUS CONTINUOUS
Status: DISCONTINUED | OUTPATIENT
Start: 2021-07-27 | End: 2021-07-29

## 2021-07-27 RX ORDER — AMOXICILLIN 250 MG
1 CAPSULE ORAL 2 TIMES DAILY
Status: DISCONTINUED | OUTPATIENT
Start: 2021-07-27 | End: 2021-08-03 | Stop reason: HOSPADM

## 2021-07-27 RX ADMIN — HEPARIN SODIUM 12 UNITS/KG/HR: 10000 INJECTION, SOLUTION INTRAVENOUS at 08:07

## 2021-07-27 RX ADMIN — AZITHROMYCIN MONOHYDRATE 500 MG: 500 INJECTION, POWDER, LYOPHILIZED, FOR SOLUTION INTRAVENOUS at 08:07

## 2021-07-27 RX ADMIN — ASPIRIN 325 MG: 325 TABLET, COATED ORAL at 08:07

## 2021-07-27 RX ADMIN — CLOPIDOGREL BISULFATE 600 MG: 300 TABLET, FILM COATED ORAL at 08:07

## 2021-07-27 RX ADMIN — DOCUSATE SODIUM 50 MG AND SENNOSIDES 8.6 MG 1 TABLET: 8.6; 5 TABLET, FILM COATED ORAL at 08:07

## 2021-07-27 RX ADMIN — CEFTRIAXONE 2 G: 2 INJECTION, SOLUTION INTRAVENOUS at 07:07

## 2021-07-28 PROBLEM — J15.9 COMMUNITY ACQUIRED BACTERIAL PNEUMONIA: Status: ACTIVE | Noted: 2021-07-28

## 2021-07-28 PROBLEM — I63.81 CEREBROVASCULAR ACCIDENT (CVA) OF RIGHT THALAMUS: Status: ACTIVE | Noted: 2021-07-28

## 2021-07-28 PROBLEM — J18.9 PNEUMONIA: Status: ACTIVE | Noted: 2021-07-28

## 2021-07-28 PROBLEM — I50.32 CHRONIC DIASTOLIC HEART FAILURE: Status: ACTIVE | Noted: 2019-08-14

## 2021-07-28 LAB
ALBUMIN SERPL BCP-MCNC: 3.1 G/DL (ref 3.5–5.2)
ALP SERPL-CCNC: 97 U/L (ref 55–135)
ALT SERPL W/O P-5'-P-CCNC: 7 U/L (ref 10–44)
ANION GAP SERPL CALC-SCNC: 11 MMOL/L (ref 8–16)
AORTIC ROOT ANNULUS: 2.59 CM
AORTIC VALVE CUSP SEPERATION: 1.04 CM
APTT BLDCRRT: 122.5 SEC (ref 21–32)
APTT BLDCRRT: 36.5 SEC (ref 21–32)
APTT BLDCRRT: 89 SEC (ref 21–32)
AST SERPL-CCNC: 24 U/L (ref 10–40)
AV INDEX (PROSTH): 0.37
AV MEAN GRADIENT: 14 MMHG
AV PEAK GRADIENT: 23 MMHG
AV VALVE AREA: 1.02 CM2
AV VELOCITY RATIO: 0.34
BASOPHILS # BLD AUTO: 0.08 K/UL (ref 0–0.2)
BASOPHILS NFR BLD: 0.6 % (ref 0–1.9)
BILIRUB SERPL-MCNC: 0.6 MG/DL (ref 0.1–1)
BSA FOR ECHO PROCEDURE: 1.39 M2
BUN SERPL-MCNC: 20 MG/DL (ref 8–23)
CALCIUM SERPL-MCNC: 9.9 MG/DL (ref 8.7–10.5)
CHLORIDE SERPL-SCNC: 99 MMOL/L (ref 95–110)
CHOLEST SERPL-MCNC: 113 MG/DL (ref 120–199)
CHOLEST SERPL-MCNC: 114 MG/DL (ref 120–199)
CHOLEST/HDLC SERPL: 2.5 {RATIO} (ref 2–5)
CHOLEST/HDLC SERPL: 2.5 {RATIO} (ref 2–5)
CO2 SERPL-SCNC: 25 MMOL/L (ref 23–29)
CREAT SERPL-MCNC: 0.9 MG/DL (ref 0.5–1.4)
CV ECHO LV RWT: 0.8 CM
DIFFERENTIAL METHOD: ABNORMAL
DOP CALC AO PEAK VEL: 2.4 M/S
DOP CALC AO VTI: 47.86 CM
DOP CALC LVOT AREA: 2.8 CM2
DOP CALC LVOT DIAMETER: 1.88 CM
DOP CALC LVOT PEAK VEL: 0.82 M/S
DOP CALC LVOT STROKE VOLUME: 49.05 CM3
DOP CALCLVOT PEAK VEL VTI: 17.68 CM
E WAVE DECELERATION TIME: 360.23 MSEC
E/A RATIO: 0.59
E/E' RATIO: 25.43 M/S
ECHO LV POSTERIOR WALL: 1.16 CM (ref 0.6–1.1)
EJECTION FRACTION: 60 %
EOSINOPHIL # BLD AUTO: 0.1 K/UL (ref 0–0.5)
EOSINOPHIL NFR BLD: 0.6 % (ref 0–8)
ERYTHROCYTE [DISTWIDTH] IN BLOOD BY AUTOMATED COUNT: 13.4 % (ref 11.5–14.5)
EST. GFR  (AFRICAN AMERICAN): >60 ML/MIN/1.73 M^2
EST. GFR  (NON AFRICAN AMERICAN): 59 ML/MIN/1.73 M^2
ESTIMATED AVG GLUCOSE: 100 MG/DL (ref 68–131)
FRACTIONAL SHORTENING: 22 % (ref 28–44)
GLUCOSE SERPL-MCNC: 109 MG/DL (ref 70–110)
HBA1C MFR BLD: 5.1 % (ref 4–5.6)
HCT VFR BLD AUTO: 43.1 % (ref 37–48.5)
HDLC SERPL-MCNC: 45 MG/DL (ref 40–75)
HDLC SERPL-MCNC: 46 MG/DL (ref 40–75)
HDLC SERPL: 39.5 % (ref 20–50)
HDLC SERPL: 40.7 % (ref 20–50)
HGB BLD-MCNC: 13.9 G/DL (ref 12–16)
IMM GRANULOCYTES # BLD AUTO: 0.15 K/UL (ref 0–0.04)
IMM GRANULOCYTES NFR BLD AUTO: 1.2 % (ref 0–0.5)
INTERVENTRICULAR SEPTUM: 1.19 CM (ref 0.6–1.1)
IVRT: 114.19 MSEC
LA MAJOR: 4.78 CM
LA MINOR: 3.6 CM
LA WIDTH: 2.96 CM
LDLC SERPL CALC-MCNC: 51 MG/DL (ref 63–159)
LDLC SERPL CALC-MCNC: 56.8 MG/DL (ref 63–159)
LEFT ATRIUM SIZE: 4.46 CM
LEFT ATRIUM VOLUME INDEX: 32.9 ML/M2
LEFT ATRIUM VOLUME: 46.09 CM3
LEFT INTERNAL DIMENSION IN SYSTOLE: 2.26 CM (ref 2.1–4)
LEFT VENTRICLE DIASTOLIC VOLUME INDEX: 22.92 ML/M2
LEFT VENTRICLE DIASTOLIC VOLUME: 32.09 ML
LEFT VENTRICLE MASS INDEX: 72 G/M2
LEFT VENTRICLE SYSTOLIC VOLUME INDEX: 12.4 ML/M2
LEFT VENTRICLE SYSTOLIC VOLUME: 17.32 ML
LEFT VENTRICULAR INTERNAL DIMENSION IN DIASTOLE: 2.9 CM (ref 3.5–6)
LEFT VENTRICULAR MASS: 100.7 G
LV LATERAL E/E' RATIO: 22.25 M/S
LV SEPTAL E/E' RATIO: 29.67 M/S
LYMPHOCYTES # BLD AUTO: 2.7 K/UL (ref 1–4.8)
LYMPHOCYTES NFR BLD: 20.8 % (ref 18–48)
MAGNESIUM SERPL-MCNC: 1.7 MG/DL (ref 1.6–2.6)
MCH RBC QN AUTO: 29.8 PG (ref 27–31)
MCHC RBC AUTO-ENTMCNC: 32.3 G/DL (ref 32–36)
MCV RBC AUTO: 93 FL (ref 82–98)
MONOCYTES # BLD AUTO: 0.9 K/UL (ref 0.3–1)
MONOCYTES NFR BLD: 7 % (ref 4–15)
MV PEAK A VEL: 1.52 M/S
MV PEAK E VEL: 0.89 M/S
MV STENOSIS PRESSURE HALF TIME: 196.74 MS
MV VALVE AREA P 1/2 METHOD: 1.12 CM2
NEUTROPHILS # BLD AUTO: 8.9 K/UL (ref 1.8–7.7)
NEUTROPHILS NFR BLD: 69.8 % (ref 38–73)
NONHDLC SERPL-MCNC: 67 MG/DL
NONHDLC SERPL-MCNC: 69 MG/DL
NRBC BLD-RTO: 0 /100 WBC
PHOSPHATE SERPL-MCNC: 3.3 MG/DL (ref 2.7–4.5)
PISA TR MAX VEL: 3.72 M/S
PLATELET # BLD AUTO: 161 K/UL (ref 150–450)
PMV BLD AUTO: 11.6 FL (ref 9.2–12.9)
POTASSIUM SERPL-SCNC: 4.1 MMOL/L (ref 3.5–5.1)
PROT SERPL-MCNC: 7.2 G/DL (ref 6–8.4)
PV PEAK VELOCITY: 0.74 CM/S
RA MAJOR: 4.69 CM
RA PRESSURE: 8 MMHG
RA WIDTH: 2.94 CM
RBC # BLD AUTO: 4.66 M/UL (ref 4–5.4)
RIGHT VENTRICULAR END-DIASTOLIC DIMENSION: 2.45 CM
RV TISSUE DOPPLER FREE WALL SYSTOLIC VELOCITY 1 (APICAL 4 CHAMBER VIEW): 10.82 CM/S
SINUS: 2.93 CM
SODIUM SERPL-SCNC: 135 MMOL/L (ref 136–145)
STJ: 1.9 CM
TDI LATERAL: 0.04 M/S
TDI SEPTAL: 0.03 M/S
TDI: 0.04 M/S
TR MAX PG: 55 MMHG
TRICUSPID ANNULAR PLANE SYSTOLIC EXCURSION: 2.15 CM
TRIGL SERPL-MCNC: 61 MG/DL (ref 30–150)
TRIGL SERPL-MCNC: 80 MG/DL (ref 30–150)
TROPONIN I SERPL DL<=0.01 NG/ML-MCNC: 1.34 NG/ML (ref 0–0.03)
TROPONIN I SERPL DL<=0.01 NG/ML-MCNC: 1.61 NG/ML (ref 0–0.03)
TV REST PULMONARY ARTERY PRESSURE: 63 MMHG
WBC # BLD AUTO: 12.78 K/UL (ref 3.9–12.7)

## 2021-07-28 PROCEDURE — 85025 COMPLETE CBC W/AUTO DIFF WBC: CPT | Performed by: INTERNAL MEDICINE

## 2021-07-28 PROCEDURE — 97161 PT EVAL LOW COMPLEX 20 MIN: CPT

## 2021-07-28 PROCEDURE — 63600175 PHARM REV CODE 636 W HCPCS: Performed by: INTERNAL MEDICINE

## 2021-07-28 PROCEDURE — 36415 COLL VENOUS BLD VENIPUNCTURE: CPT | Performed by: INTERNAL MEDICINE

## 2021-07-28 PROCEDURE — 84100 ASSAY OF PHOSPHORUS: CPT | Performed by: INTERNAL MEDICINE

## 2021-07-28 PROCEDURE — 80061 LIPID PANEL: CPT | Performed by: INTERNAL MEDICINE

## 2021-07-28 PROCEDURE — 25000003 PHARM REV CODE 250: Performed by: INTERNAL MEDICINE

## 2021-07-28 PROCEDURE — 99232 PR SUBSEQUENT HOSPITAL CARE,LEVL II: ICD-10-PCS | Mod: ,,, | Performed by: PSYCHIATRY & NEUROLOGY

## 2021-07-28 PROCEDURE — 21400001 HC TELEMETRY ROOM

## 2021-07-28 PROCEDURE — 80053 COMPREHEN METABOLIC PANEL: CPT | Performed by: INTERNAL MEDICINE

## 2021-07-28 PROCEDURE — A4216 STERILE WATER/SALINE, 10 ML: HCPCS | Performed by: INTERNAL MEDICINE

## 2021-07-28 PROCEDURE — 99233 SBSQ HOSP IP/OBS HIGH 50: CPT | Mod: ,,, | Performed by: INTERNAL MEDICINE

## 2021-07-28 PROCEDURE — 99233 PR SUBSEQUENT HOSPITAL CARE,LEVL III: ICD-10-PCS | Mod: ,,, | Performed by: INTERNAL MEDICINE

## 2021-07-28 PROCEDURE — 36415 COLL VENOUS BLD VENIPUNCTURE: CPT | Performed by: HOSPITALIST

## 2021-07-28 PROCEDURE — 99232 SBSQ HOSP IP/OBS MODERATE 35: CPT | Mod: ,,, | Performed by: PSYCHIATRY & NEUROLOGY

## 2021-07-28 PROCEDURE — 84484 ASSAY OF TROPONIN QUANT: CPT | Mod: 91 | Performed by: INTERNAL MEDICINE

## 2021-07-28 PROCEDURE — 83735 ASSAY OF MAGNESIUM: CPT | Performed by: INTERNAL MEDICINE

## 2021-07-28 PROCEDURE — 85730 THROMBOPLASTIN TIME PARTIAL: CPT | Mod: 91 | Performed by: HOSPITALIST

## 2021-07-28 PROCEDURE — 84484 ASSAY OF TROPONIN QUANT: CPT | Performed by: INTERNAL MEDICINE

## 2021-07-28 PROCEDURE — 97165 OT EVAL LOW COMPLEX 30 MIN: CPT

## 2021-07-28 PROCEDURE — 92610 EVALUATE SWALLOWING FUNCTION: CPT

## 2021-07-28 RX ORDER — LEVOTHYROXINE SODIUM 75 UG/1
75 TABLET ORAL
Status: DISCONTINUED | OUTPATIENT
Start: 2021-07-28 | End: 2021-08-03 | Stop reason: HOSPADM

## 2021-07-28 RX ORDER — ASPIRIN 81 MG/1
81 TABLET ORAL DAILY
Status: DISCONTINUED | OUTPATIENT
Start: 2021-07-29 | End: 2021-08-03 | Stop reason: HOSPADM

## 2021-07-28 RX ORDER — PRAVASTATIN SODIUM 40 MG/1
40 TABLET ORAL DAILY
Status: DISCONTINUED | OUTPATIENT
Start: 2021-07-28 | End: 2021-08-03 | Stop reason: HOSPADM

## 2021-07-28 RX ORDER — CARVEDILOL 3.12 MG/1
3.12 TABLET ORAL 2 TIMES DAILY
Status: DISCONTINUED | OUTPATIENT
Start: 2021-07-28 | End: 2021-07-28

## 2021-07-28 RX ORDER — LANOLIN ALCOHOL/MO/W.PET/CERES
100 CREAM (GRAM) TOPICAL DAILY
Status: DISCONTINUED | OUTPATIENT
Start: 2021-07-28 | End: 2021-08-03 | Stop reason: HOSPADM

## 2021-07-28 RX ORDER — FOLIC ACID 1 MG/1
1 TABLET ORAL DAILY
Status: DISCONTINUED | OUTPATIENT
Start: 2021-07-28 | End: 2021-08-03 | Stop reason: HOSPADM

## 2021-07-28 RX ADMIN — LEVOTHYROXINE SODIUM 75 MCG: 75 TABLET ORAL at 05:07

## 2021-07-28 RX ADMIN — THERA TABS 1 TABLET: TAB at 03:07

## 2021-07-28 RX ADMIN — Medication 10 ML: at 05:07

## 2021-07-28 RX ADMIN — CARVEDILOL 3.12 MG: 3.12 TABLET, FILM COATED ORAL at 01:07

## 2021-07-28 RX ADMIN — HEPARIN SODIUM 14 UNITS/KG/HR: 10000 INJECTION, SOLUTION INTRAVENOUS at 06:07

## 2021-07-28 RX ADMIN — PRAVASTATIN SODIUM 40 MG: 40 TABLET ORAL at 01:07

## 2021-07-28 RX ADMIN — DOCUSATE SODIUM 50 MG AND SENNOSIDES 8.6 MG 1 TABLET: 8.6; 5 TABLET, FILM COATED ORAL at 09:07

## 2021-07-28 RX ADMIN — Medication 10 ML: at 04:07

## 2021-07-28 RX ADMIN — Medication 100 MG: at 03:07

## 2021-07-28 RX ADMIN — PRAVASTATIN SODIUM 40 MG: 40 TABLET ORAL at 03:07

## 2021-07-28 RX ADMIN — Medication 10 ML: at 01:07

## 2021-07-28 RX ADMIN — FOLIC ACID 1 MG: 1 TABLET ORAL at 03:07

## 2021-07-28 RX ADMIN — DOCUSATE SODIUM 50 MG AND SENNOSIDES 8.6 MG 1 TABLET: 8.6; 5 TABLET, FILM COATED ORAL at 03:07

## 2021-07-28 RX ADMIN — CEFTRIAXONE 2 G: 2 INJECTION, SOLUTION INTRAVENOUS at 09:07

## 2021-07-28 RX ADMIN — AZITHROMYCIN MONOHYDRATE 500 MG: 500 INJECTION, POWDER, LYOPHILIZED, FOR SOLUTION INTRAVENOUS at 10:07

## 2021-07-28 RX ADMIN — Medication 10 ML: at 09:07

## 2021-07-29 ENCOUNTER — PATIENT OUTREACH (OUTPATIENT)
Dept: ADMINISTRATIVE | Facility: OTHER | Age: 86
End: 2021-07-29

## 2021-07-29 LAB
APTT BLDCRRT: 71.1 SEC (ref 21–32)
BACTERIA UR CULT: ABNORMAL
BASOPHILS # BLD AUTO: 0.08 K/UL (ref 0–0.2)
BASOPHILS NFR BLD: 0.6 % (ref 0–1.9)
DIFFERENTIAL METHOD: ABNORMAL
EOSINOPHIL # BLD AUTO: 0.1 K/UL (ref 0–0.5)
EOSINOPHIL NFR BLD: 1 % (ref 0–8)
ERYTHROCYTE [DISTWIDTH] IN BLOOD BY AUTOMATED COUNT: 13.7 % (ref 11.5–14.5)
HCT VFR BLD AUTO: 39.7 % (ref 37–48.5)
HGB BLD-MCNC: 12.6 G/DL (ref 12–16)
IMM GRANULOCYTES # BLD AUTO: 0.17 K/UL (ref 0–0.04)
IMM GRANULOCYTES NFR BLD AUTO: 1.2 % (ref 0–0.5)
LYMPHOCYTES # BLD AUTO: 3.2 K/UL (ref 1–4.8)
LYMPHOCYTES NFR BLD: 22 % (ref 18–48)
MCH RBC QN AUTO: 29.5 PG (ref 27–31)
MCHC RBC AUTO-ENTMCNC: 31.7 G/DL (ref 32–36)
MCV RBC AUTO: 93 FL (ref 82–98)
MONOCYTES # BLD AUTO: 1.2 K/UL (ref 0.3–1)
MONOCYTES NFR BLD: 8.3 % (ref 4–15)
NEUTROPHILS # BLD AUTO: 9.6 K/UL (ref 1.8–7.7)
NEUTROPHILS NFR BLD: 66.9 % (ref 38–73)
NRBC BLD-RTO: 0 /100 WBC
PLATELET # BLD AUTO: 186 K/UL (ref 150–450)
PMV BLD AUTO: 12.3 FL (ref 9.2–12.9)
RBC # BLD AUTO: 4.27 M/UL (ref 4–5.4)
WBC # BLD AUTO: 14.38 K/UL (ref 3.9–12.7)

## 2021-07-29 PROCEDURE — 85730 THROMBOPLASTIN TIME PARTIAL: CPT | Performed by: HOSPITALIST

## 2021-07-29 PROCEDURE — 99233 PR SUBSEQUENT HOSPITAL CARE,LEVL III: ICD-10-PCS | Mod: ,,, | Performed by: INTERNAL MEDICINE

## 2021-07-29 PROCEDURE — 63600175 PHARM REV CODE 636 W HCPCS: Performed by: INTERNAL MEDICINE

## 2021-07-29 PROCEDURE — A4216 STERILE WATER/SALINE, 10 ML: HCPCS | Performed by: INTERNAL MEDICINE

## 2021-07-29 PROCEDURE — 99497 PR ADVNCD CARE PLAN 30 MIN: ICD-10-PCS | Mod: 25,,, | Performed by: NURSE PRACTITIONER

## 2021-07-29 PROCEDURE — 99498 PR ADVNCD CARE PLAN ADDL 30 MIN: ICD-10-PCS | Mod: ,,, | Performed by: NURSE PRACTITIONER

## 2021-07-29 PROCEDURE — 99497 ADVNCD CARE PLAN 30 MIN: CPT | Mod: 25,,, | Performed by: NURSE PRACTITIONER

## 2021-07-29 PROCEDURE — 25000003 PHARM REV CODE 250: Performed by: INTERNAL MEDICINE

## 2021-07-29 PROCEDURE — 21400001 HC TELEMETRY ROOM

## 2021-07-29 PROCEDURE — 99233 SBSQ HOSP IP/OBS HIGH 50: CPT | Mod: ,,, | Performed by: INTERNAL MEDICINE

## 2021-07-29 PROCEDURE — 99223 PR INITIAL HOSPITAL CARE,LEVL III: ICD-10-PCS | Mod: ,,, | Performed by: NURSE PRACTITIONER

## 2021-07-29 PROCEDURE — 85025 COMPLETE CBC W/AUTO DIFF WBC: CPT | Performed by: INTERNAL MEDICINE

## 2021-07-29 PROCEDURE — 25000003 PHARM REV CODE 250: Performed by: HOSPITALIST

## 2021-07-29 PROCEDURE — 99498 ADVNCD CARE PLAN ADDL 30 MIN: CPT | Mod: ,,, | Performed by: NURSE PRACTITIONER

## 2021-07-29 PROCEDURE — 99223 1ST HOSP IP/OBS HIGH 75: CPT | Mod: ,,, | Performed by: NURSE PRACTITIONER

## 2021-07-29 RX ORDER — METOPROLOL SUCCINATE 50 MG/1
50 TABLET, EXTENDED RELEASE ORAL DAILY
Status: DISCONTINUED | OUTPATIENT
Start: 2021-07-29 | End: 2021-07-29

## 2021-07-29 RX ORDER — METOPROLOL SUCCINATE 25 MG/1
25 TABLET, EXTENDED RELEASE ORAL DAILY
Status: DISCONTINUED | OUTPATIENT
Start: 2021-07-29 | End: 2021-08-03 | Stop reason: HOSPADM

## 2021-07-29 RX ORDER — LOSARTAN POTASSIUM 25 MG/1
25 TABLET ORAL DAILY
Status: DISCONTINUED | OUTPATIENT
Start: 2021-07-29 | End: 2021-07-29

## 2021-07-29 RX ORDER — LOSARTAN POTASSIUM 25 MG/1
50 TABLET ORAL DAILY
Status: DISCONTINUED | OUTPATIENT
Start: 2021-07-29 | End: 2021-07-29

## 2021-07-29 RX ADMIN — CEFTRIAXONE 2 G: 2 INJECTION, SOLUTION INTRAVENOUS at 08:07

## 2021-07-29 RX ADMIN — AZITHROMYCIN MONOHYDRATE 500 MG: 500 INJECTION, POWDER, LYOPHILIZED, FOR SOLUTION INTRAVENOUS at 08:07

## 2021-07-29 RX ADMIN — LEVOTHYROXINE SODIUM 75 MCG: 75 TABLET ORAL at 05:07

## 2021-07-29 RX ADMIN — PRAVASTATIN SODIUM 40 MG: 40 TABLET ORAL at 09:07

## 2021-07-29 RX ADMIN — FOLIC ACID 1 MG: 1 TABLET ORAL at 09:07

## 2021-07-29 RX ADMIN — Medication 10 ML: at 06:07

## 2021-07-29 RX ADMIN — METOPROLOL SUCCINATE 25 MG: 25 TABLET, EXTENDED RELEASE ORAL at 09:07

## 2021-07-29 RX ADMIN — APIXABAN 2.5 MG: 2.5 TABLET, FILM COATED ORAL at 08:07

## 2021-07-29 RX ADMIN — DOCUSATE SODIUM 50 MG AND SENNOSIDES 8.6 MG 1 TABLET: 8.6; 5 TABLET, FILM COATED ORAL at 08:07

## 2021-07-29 RX ADMIN — Medication 10 ML: at 09:07

## 2021-07-29 RX ADMIN — DOCUSATE SODIUM 50 MG AND SENNOSIDES 8.6 MG 1 TABLET: 8.6; 5 TABLET, FILM COATED ORAL at 09:07

## 2021-07-29 RX ADMIN — Medication 10 ML: at 05:07

## 2021-07-29 RX ADMIN — Medication 100 MG: at 09:07

## 2021-07-29 RX ADMIN — ASPIRIN 81 MG: 81 TABLET, COATED ORAL at 09:07

## 2021-07-29 RX ADMIN — THERA TABS 1 TABLET: TAB at 09:07

## 2021-07-30 PROCEDURE — 25000003 PHARM REV CODE 250: Performed by: HOSPITALIST

## 2021-07-30 PROCEDURE — 63600175 PHARM REV CODE 636 W HCPCS: Performed by: HOSPITALIST

## 2021-07-30 PROCEDURE — 92526 ORAL FUNCTION THERAPY: CPT

## 2021-07-30 PROCEDURE — 25000003 PHARM REV CODE 250: Performed by: INTERNAL MEDICINE

## 2021-07-30 PROCEDURE — 21400001 HC TELEMETRY ROOM

## 2021-07-30 PROCEDURE — A4216 STERILE WATER/SALINE, 10 ML: HCPCS | Performed by: INTERNAL MEDICINE

## 2021-07-30 PROCEDURE — 97530 THERAPEUTIC ACTIVITIES: CPT | Mod: CO

## 2021-07-30 RX ORDER — SODIUM CHLORIDE 9 MG/ML
INJECTION, SOLUTION INTRAVENOUS
Status: DISCONTINUED | OUTPATIENT
Start: 2021-07-30 | End: 2021-08-03 | Stop reason: HOSPADM

## 2021-07-30 RX ORDER — CHLORHEXIDINE GLUCONATE ORAL RINSE 1.2 MG/ML
15 SOLUTION DENTAL 2 TIMES DAILY
Status: DISCONTINUED | OUTPATIENT
Start: 2021-07-30 | End: 2021-08-03 | Stop reason: HOSPADM

## 2021-07-30 RX ADMIN — LEVOTHYROXINE SODIUM 75 MCG: 75 TABLET ORAL at 05:07

## 2021-07-30 RX ADMIN — Medication 100 MG: at 09:07

## 2021-07-30 RX ADMIN — APIXABAN 2.5 MG: 2.5 TABLET, FILM COATED ORAL at 09:07

## 2021-07-30 RX ADMIN — FOLIC ACID 1 MG: 1 TABLET ORAL at 09:07

## 2021-07-30 RX ADMIN — Medication 10 ML: at 05:07

## 2021-07-30 RX ADMIN — SODIUM CHLORIDE: 9 INJECTION, SOLUTION INTRAVENOUS at 08:07

## 2021-07-30 RX ADMIN — DOCUSATE SODIUM 50 MG AND SENNOSIDES 8.6 MG 1 TABLET: 8.6; 5 TABLET, FILM COATED ORAL at 08:07

## 2021-07-30 RX ADMIN — METOPROLOL SUCCINATE 25 MG: 25 TABLET, EXTENDED RELEASE ORAL at 09:07

## 2021-07-30 RX ADMIN — THERA TABS 1 TABLET: TAB at 09:07

## 2021-07-30 RX ADMIN — APIXABAN 2.5 MG: 2.5 TABLET, FILM COATED ORAL at 08:07

## 2021-07-30 RX ADMIN — DOCUSATE SODIUM 50 MG AND SENNOSIDES 8.6 MG 1 TABLET: 8.6; 5 TABLET, FILM COATED ORAL at 09:07

## 2021-07-30 RX ADMIN — ASPIRIN 81 MG: 81 TABLET, COATED ORAL at 09:07

## 2021-07-30 RX ADMIN — Medication 10 ML: at 06:07

## 2021-07-30 RX ADMIN — 0.12% CHLORHEXIDINE GLUCONATE 15 ML: 1.2 RINSE ORAL at 08:07

## 2021-07-30 RX ADMIN — CEFTRIAXONE 1 G: 1 INJECTION, SOLUTION INTRAVENOUS at 08:07

## 2021-07-30 RX ADMIN — PRAVASTATIN SODIUM 40 MG: 40 TABLET ORAL at 09:07

## 2021-07-30 RX ADMIN — 0.12% CHLORHEXIDINE GLUCONATE 15 ML: 1.2 RINSE ORAL at 09:07

## 2021-07-31 LAB
BACTERIA BLD CULT: NORMAL
BACTERIA BLD CULT: NORMAL

## 2021-07-31 PROCEDURE — 25000003 PHARM REV CODE 250: Performed by: INTERNAL MEDICINE

## 2021-07-31 PROCEDURE — A4216 STERILE WATER/SALINE, 10 ML: HCPCS | Performed by: INTERNAL MEDICINE

## 2021-07-31 PROCEDURE — 21400001 HC TELEMETRY ROOM

## 2021-07-31 PROCEDURE — 63600175 PHARM REV CODE 636 W HCPCS: Performed by: HOSPITALIST

## 2021-07-31 PROCEDURE — 25000003 PHARM REV CODE 250: Performed by: HOSPITALIST

## 2021-07-31 RX ADMIN — Medication 100 MG: at 09:07

## 2021-07-31 RX ADMIN — Medication 10 ML: at 09:07

## 2021-07-31 RX ADMIN — 0.12% CHLORHEXIDINE GLUCONATE 15 ML: 1.2 RINSE ORAL at 09:07

## 2021-07-31 RX ADMIN — THERA TABS 1 TABLET: TAB at 09:07

## 2021-07-31 RX ADMIN — ASPIRIN 81 MG: 81 TABLET, COATED ORAL at 09:07

## 2021-07-31 RX ADMIN — LEVOTHYROXINE SODIUM 75 MCG: 75 TABLET ORAL at 06:07

## 2021-07-31 RX ADMIN — PRAVASTATIN SODIUM 40 MG: 40 TABLET ORAL at 09:07

## 2021-07-31 RX ADMIN — CEFTRIAXONE 1 G: 1 INJECTION, SOLUTION INTRAVENOUS at 08:07

## 2021-07-31 RX ADMIN — Medication 10 ML: at 01:07

## 2021-07-31 RX ADMIN — METOPROLOL SUCCINATE 25 MG: 25 TABLET, EXTENDED RELEASE ORAL at 09:07

## 2021-07-31 RX ADMIN — APIXABAN 2.5 MG: 2.5 TABLET, FILM COATED ORAL at 08:07

## 2021-07-31 RX ADMIN — DOCUSATE SODIUM 50 MG AND SENNOSIDES 8.6 MG 1 TABLET: 8.6; 5 TABLET, FILM COATED ORAL at 08:07

## 2021-07-31 RX ADMIN — APIXABAN 2.5 MG: 2.5 TABLET, FILM COATED ORAL at 09:07

## 2021-07-31 RX ADMIN — Medication 10 ML: at 06:07

## 2021-07-31 RX ADMIN — 0.12% CHLORHEXIDINE GLUCONATE 15 ML: 1.2 RINSE ORAL at 08:07

## 2021-07-31 RX ADMIN — DOCUSATE SODIUM 50 MG AND SENNOSIDES 8.6 MG 1 TABLET: 8.6; 5 TABLET, FILM COATED ORAL at 09:07

## 2021-07-31 RX ADMIN — FOLIC ACID 1 MG: 1 TABLET ORAL at 09:07

## 2021-08-01 PROCEDURE — A4216 STERILE WATER/SALINE, 10 ML: HCPCS | Performed by: INTERNAL MEDICINE

## 2021-08-01 PROCEDURE — 25000003 PHARM REV CODE 250: Performed by: HOSPITALIST

## 2021-08-01 PROCEDURE — 63600175 PHARM REV CODE 636 W HCPCS: Performed by: HOSPITALIST

## 2021-08-01 PROCEDURE — 25000003 PHARM REV CODE 250: Performed by: INTERNAL MEDICINE

## 2021-08-01 PROCEDURE — 21400001 HC TELEMETRY ROOM

## 2021-08-01 RX ORDER — AMLODIPINE BESYLATE 5 MG/1
10 TABLET ORAL DAILY
Status: DISCONTINUED | OUTPATIENT
Start: 2021-08-01 | End: 2021-08-02

## 2021-08-01 RX ORDER — HYDRALAZINE HYDROCHLORIDE 20 MG/ML
10 INJECTION INTRAMUSCULAR; INTRAVENOUS EVERY 4 HOURS PRN
Status: DISCONTINUED | OUTPATIENT
Start: 2021-08-01 | End: 2021-08-03 | Stop reason: HOSPADM

## 2021-08-01 RX ADMIN — HYDRALAZINE HYDROCHLORIDE 10 MG: 20 INJECTION INTRAMUSCULAR; INTRAVENOUS at 10:08

## 2021-08-01 RX ADMIN — METOPROLOL SUCCINATE 25 MG: 25 TABLET, EXTENDED RELEASE ORAL at 09:08

## 2021-08-01 RX ADMIN — THERA TABS 1 TABLET: TAB at 09:08

## 2021-08-01 RX ADMIN — CEFTRIAXONE 1 G: 1 INJECTION, SOLUTION INTRAVENOUS at 07:08

## 2021-08-01 RX ADMIN — Medication 10 ML: at 10:08

## 2021-08-01 RX ADMIN — APIXABAN 2.5 MG: 2.5 TABLET, FILM COATED ORAL at 08:08

## 2021-08-01 RX ADMIN — Medication 10 ML: at 03:08

## 2021-08-01 RX ADMIN — 0.12% CHLORHEXIDINE GLUCONATE 15 ML: 1.2 RINSE ORAL at 09:08

## 2021-08-01 RX ADMIN — APIXABAN 2.5 MG: 2.5 TABLET, FILM COATED ORAL at 09:08

## 2021-08-01 RX ADMIN — DOCUSATE SODIUM 50 MG AND SENNOSIDES 8.6 MG 1 TABLET: 8.6; 5 TABLET, FILM COATED ORAL at 08:08

## 2021-08-01 RX ADMIN — DOCUSATE SODIUM 50 MG AND SENNOSIDES 8.6 MG 1 TABLET: 8.6; 5 TABLET, FILM COATED ORAL at 09:08

## 2021-08-01 RX ADMIN — Medication 10 ML: at 06:08

## 2021-08-01 RX ADMIN — ASPIRIN 81 MG: 81 TABLET, COATED ORAL at 09:08

## 2021-08-01 RX ADMIN — PRAVASTATIN SODIUM 40 MG: 40 TABLET ORAL at 09:08

## 2021-08-01 RX ADMIN — FOLIC ACID 1 MG: 1 TABLET ORAL at 09:08

## 2021-08-01 RX ADMIN — Medication 100 MG: at 09:08

## 2021-08-02 PROCEDURE — 30200315 PPD INTRADERMAL TEST REV CODE 302: Performed by: HOSPITALIST

## 2021-08-02 PROCEDURE — A4216 STERILE WATER/SALINE, 10 ML: HCPCS | Performed by: INTERNAL MEDICINE

## 2021-08-02 PROCEDURE — 25000003 PHARM REV CODE 250: Performed by: INTERNAL MEDICINE

## 2021-08-02 PROCEDURE — 25000003 PHARM REV CODE 250: Performed by: HOSPITALIST

## 2021-08-02 PROCEDURE — 86580 TB INTRADERMAL TEST: CPT | Performed by: HOSPITALIST

## 2021-08-02 PROCEDURE — 21400001 HC TELEMETRY ROOM

## 2021-08-02 PROCEDURE — 63600175 PHARM REV CODE 636 W HCPCS: Performed by: HOSPITALIST

## 2021-08-02 RX ORDER — METOPROLOL SUCCINATE 25 MG/1
25 TABLET, EXTENDED RELEASE ORAL DAILY
Qty: 30 TABLET | Refills: 11
Start: 2021-08-02 | End: 2022-08-02

## 2021-08-02 RX ORDER — AMOXICILLIN 250 MG
1 CAPSULE ORAL 2 TIMES DAILY
Start: 2021-08-02

## 2021-08-02 RX ORDER — LOSARTAN POTASSIUM 50 MG/1
50 TABLET ORAL DAILY
Qty: 90 TABLET | Refills: 3
Start: 2021-08-02 | End: 2022-08-02

## 2021-08-02 RX ORDER — LOSARTAN POTASSIUM 25 MG/1
50 TABLET ORAL DAILY
Status: DISCONTINUED | OUTPATIENT
Start: 2021-08-02 | End: 2021-08-03 | Stop reason: HOSPADM

## 2021-08-02 RX ADMIN — 0.12% CHLORHEXIDINE GLUCONATE 15 ML: 1.2 RINSE ORAL at 10:08

## 2021-08-02 RX ADMIN — ACETAMINOPHEN 650 MG: 325 TABLET ORAL at 04:08

## 2021-08-02 RX ADMIN — 0.12% CHLORHEXIDINE GLUCONATE 15 ML: 1.2 RINSE ORAL at 08:08

## 2021-08-02 RX ADMIN — TUBERCULIN PURIFIED PROTEIN DERIVATIVE 5 UNITS: 5 INJECTION, SOLUTION INTRADERMAL at 02:08

## 2021-08-02 RX ADMIN — CEFTRIAXONE 1 G: 1 INJECTION, SOLUTION INTRAVENOUS at 08:08

## 2021-08-02 RX ADMIN — Medication 10 ML: at 09:08

## 2021-08-02 RX ADMIN — LOSARTAN POTASSIUM 50 MG: 25 TABLET, FILM COATED ORAL at 10:08

## 2021-08-02 RX ADMIN — APIXABAN 2.5 MG: 2.5 TABLET, FILM COATED ORAL at 10:08

## 2021-08-02 RX ADMIN — Medication 100 MG: at 10:08

## 2021-08-02 RX ADMIN — THERA TABS 1 TABLET: TAB at 10:08

## 2021-08-02 RX ADMIN — PRAVASTATIN SODIUM 40 MG: 40 TABLET ORAL at 10:08

## 2021-08-02 RX ADMIN — LEVOTHYROXINE SODIUM 75 MCG: 75 TABLET ORAL at 06:08

## 2021-08-02 RX ADMIN — DOCUSATE SODIUM 50 MG AND SENNOSIDES 8.6 MG 1 TABLET: 8.6; 5 TABLET, FILM COATED ORAL at 10:08

## 2021-08-02 RX ADMIN — Medication 10 ML: at 07:08

## 2021-08-02 RX ADMIN — METOPROLOL SUCCINATE 25 MG: 25 TABLET, EXTENDED RELEASE ORAL at 10:08

## 2021-08-02 RX ADMIN — Medication 10 ML: at 02:08

## 2021-08-02 RX ADMIN — APIXABAN 2.5 MG: 2.5 TABLET, FILM COATED ORAL at 08:08

## 2021-08-02 RX ADMIN — ASPIRIN 81 MG: 81 TABLET, COATED ORAL at 10:08

## 2021-08-02 RX ADMIN — FOLIC ACID 1 MG: 1 TABLET ORAL at 10:08

## 2021-08-03 VITALS
SYSTOLIC BLOOD PRESSURE: 137 MMHG | WEIGHT: 104.06 LBS | OXYGEN SATURATION: 95 % | HEART RATE: 81 BPM | HEIGHT: 61 IN | RESPIRATION RATE: 18 BRPM | TEMPERATURE: 98 F | BODY MASS INDEX: 19.65 KG/M2 | DIASTOLIC BLOOD PRESSURE: 74 MMHG

## 2021-08-03 PROCEDURE — 25000003 PHARM REV CODE 250: Performed by: HOSPITALIST

## 2021-08-03 PROCEDURE — 25000003 PHARM REV CODE 250: Performed by: INTERNAL MEDICINE

## 2021-08-03 PROCEDURE — A4216 STERILE WATER/SALINE, 10 ML: HCPCS | Performed by: INTERNAL MEDICINE

## 2021-08-03 RX ADMIN — DOCUSATE SODIUM 50 MG AND SENNOSIDES 8.6 MG 1 TABLET: 8.6; 5 TABLET, FILM COATED ORAL at 09:08

## 2021-08-03 RX ADMIN — LEVOTHYROXINE SODIUM 75 MCG: 75 TABLET ORAL at 05:08

## 2021-08-03 RX ADMIN — Medication 100 MG: at 09:08

## 2021-08-03 RX ADMIN — FOLIC ACID 1 MG: 1 TABLET ORAL at 09:08

## 2021-08-03 RX ADMIN — THERA TABS 1 TABLET: TAB at 09:08

## 2021-08-03 RX ADMIN — Medication 10 ML: at 05:08

## 2021-08-03 RX ADMIN — 0.12% CHLORHEXIDINE GLUCONATE 15 ML: 1.2 RINSE ORAL at 09:08

## 2021-08-03 RX ADMIN — LOSARTAN POTASSIUM 50 MG: 25 TABLET, FILM COATED ORAL at 09:08

## 2021-08-03 RX ADMIN — PRAVASTATIN SODIUM 40 MG: 40 TABLET ORAL at 09:08

## 2021-08-03 RX ADMIN — METOPROLOL SUCCINATE 25 MG: 25 TABLET, EXTENDED RELEASE ORAL at 09:08

## 2021-08-03 RX ADMIN — ASPIRIN 81 MG: 81 TABLET, COATED ORAL at 09:08

## 2021-08-03 RX ADMIN — APIXABAN 2.5 MG: 2.5 TABLET, FILM COATED ORAL at 09:08

## 2021-09-17 RX ORDER — LEVOTHYROXINE SODIUM 75 UG/1
TABLET ORAL
Qty: 90 TABLET | Refills: 0 | OUTPATIENT
Start: 2021-09-17